# Patient Record
Sex: FEMALE | Race: WHITE | Employment: OTHER | URBAN - METROPOLITAN AREA
[De-identification: names, ages, dates, MRNs, and addresses within clinical notes are randomized per-mention and may not be internally consistent; named-entity substitution may affect disease eponyms.]

---

## 2017-02-08 PROBLEM — R41.89 COGNITIVE IMPAIRMENT: Status: ACTIVE | Noted: 2017-02-08

## 2017-02-28 PROBLEM — I67.2 CEREBRAL ATHEROSCLEROSIS: Status: ACTIVE | Noted: 2017-02-28

## 2018-04-13 ENCOUNTER — HOSPITAL ENCOUNTER (OUTPATIENT)
Age: 83
Discharge: HOME | End: 2018-04-13
Payer: MEDICARE

## 2018-04-13 LAB
ALBUMIN SERPL-MCNC: 4.1 G/DL (ref 3.5–5.2)
ALP BLD-CCNC: 103 U/L (ref 35–104)
ALT SERPL-CCNC: 16 U/L (ref 0–32)
ANION GAP SERPL CALCULATED.3IONS-SCNC: 15 MMOL/L (ref 7–16)
AST SERPL-CCNC: 15 U/L (ref 0–31)
BILIRUB SERPL-MCNC: 0.6 MG/DL (ref 0–1.2)
BUN BLDV-MCNC: 38 MG/DL (ref 8–23)
CALCIUM SERPL-MCNC: 9.1 MG/DL (ref 8.6–10.2)
CHLORIDE BLD-SCNC: 103 MMOL/L (ref 98–107)
CHOLESTEROL, TOTAL: 127 MG/DL (ref 0–199)
CO2: 26 MMOL/L (ref 22–29)
CREAT SERPL-MCNC: 1.4 MG/DL (ref 0.5–1)
GFR AFRICAN AMERICAN: 43
GFR NON-AFRICAN AMERICAN: 36 ML/MIN/1.73
GLUCOSE BLD-MCNC: 154 MG/DL (ref 74–109)
HDLC SERPL-MCNC: 56 MG/DL
LDL CHOLESTEROL CALCULATED: 49 MG/DL (ref 0–99)
POTASSIUM SERPL-SCNC: 4.8 MMOL/L (ref 3.5–5)
SODIUM BLD-SCNC: 144 MMOL/L (ref 132–146)
TOTAL PROTEIN: 6.6 G/DL (ref 6.4–8.3)
TRIGL SERPL-MCNC: 108 MG/DL (ref 0–149)
TSH SERPL DL<=0.05 MIU/L-ACNC: 17.34 UIU/ML (ref 0.27–4.2)
VITAMIN D 25-HYDROXY: 47 NG/ML (ref 30–100)
VLDLC SERPL CALC-MCNC: 22 MG/DL

## 2018-04-13 PROCEDURE — 80061 LIPID PANEL: CPT

## 2018-04-13 PROCEDURE — 84443 ASSAY THYROID STIM HORMONE: CPT

## 2018-04-13 PROCEDURE — 82306 VITAMIN D 25 HYDROXY: CPT

## 2018-04-13 PROCEDURE — 80053 COMPREHEN METABOLIC PANEL: CPT

## 2018-04-13 PROCEDURE — 36415 COLL VENOUS BLD VENIPUNCTURE: CPT

## 2018-09-06 ENCOUNTER — HOSPITAL ENCOUNTER (OUTPATIENT)
Age: 83
Discharge: HOME | End: 2018-09-06
Payer: MEDICARE

## 2018-09-06 LAB — TSH SERPL DL<=0.05 MIU/L-ACNC: 2.92 UIU/ML (ref 0.27–4.2)

## 2018-09-06 PROCEDURE — 36415 COLL VENOUS BLD VENIPUNCTURE: CPT

## 2018-09-06 PROCEDURE — 84443 ASSAY THYROID STIM HORMONE: CPT

## 2019-01-01 ENCOUNTER — OFFICE VISIT (OUTPATIENT)
Dept: PRIMARY CARE CLINIC | Age: 84
End: 2019-01-01
Payer: MEDICARE

## 2019-01-01 VITALS
RESPIRATION RATE: 20 BRPM | BODY MASS INDEX: 26.93 KG/M2 | WEIGHT: 152 LBS | DIASTOLIC BLOOD PRESSURE: 56 MMHG | HEART RATE: 56 BPM | SYSTOLIC BLOOD PRESSURE: 116 MMHG | HEIGHT: 63 IN | OXYGEN SATURATION: 96 %

## 2019-01-01 VITALS
HEART RATE: 69 BPM | HEIGHT: 63 IN | RESPIRATION RATE: 20 BRPM | WEIGHT: 152 LBS | OXYGEN SATURATION: 97 % | DIASTOLIC BLOOD PRESSURE: 66 MMHG | SYSTOLIC BLOOD PRESSURE: 125 MMHG | BODY MASS INDEX: 26.93 KG/M2

## 2019-01-01 DIAGNOSIS — N39.0 RECURRENT UTI: ICD-10-CM

## 2019-01-01 DIAGNOSIS — I25.10 CORONARY ARTERIOSCLEROSIS: ICD-10-CM

## 2019-01-01 DIAGNOSIS — Z79.4 TYPE 2 DIABETES MELLITUS WITH STAGE 3 CHRONIC KIDNEY DISEASE, WITH LONG-TERM CURRENT USE OF INSULIN (HCC): ICD-10-CM

## 2019-01-01 DIAGNOSIS — Z23 NEED FOR PNEUMOCOCCAL VACCINATION: ICD-10-CM

## 2019-01-01 DIAGNOSIS — F02.818 LATE ONSET ALZHEIMER'S DISEASE WITH BEHAVIORAL DISTURBANCE (HCC): Primary | ICD-10-CM

## 2019-01-01 DIAGNOSIS — I10 BENIGN HYPERTENSION: ICD-10-CM

## 2019-01-01 DIAGNOSIS — Z23 NEED FOR INFLUENZA VACCINATION: Primary | ICD-10-CM

## 2019-01-01 DIAGNOSIS — N18.30 TYPE 2 DIABETES MELLITUS WITH STAGE 3 CHRONIC KIDNEY DISEASE, WITH LONG-TERM CURRENT USE OF INSULIN (HCC): ICD-10-CM

## 2019-01-01 DIAGNOSIS — M48.061 SPINAL STENOSIS, LUMBAR REGION, WITHOUT NEUROGENIC CLAUDICATION: ICD-10-CM

## 2019-01-01 DIAGNOSIS — I67.2 CEREBRAL ATHEROSCLEROSIS: ICD-10-CM

## 2019-01-01 DIAGNOSIS — G30.1 LATE ONSET ALZHEIMER'S DISEASE WITH BEHAVIORAL DISTURBANCE (HCC): Primary | ICD-10-CM

## 2019-01-01 DIAGNOSIS — E11.22 TYPE 2 DIABETES MELLITUS WITH STAGE 3 CHRONIC KIDNEY DISEASE, WITH LONG-TERM CURRENT USE OF INSULIN (HCC): ICD-10-CM

## 2019-01-01 LAB
BUN BLDV-MCNC: 33 MG/DL
CALCIUM SERPL-MCNC: 9.5 MG/DL
CHLORIDE BLD-SCNC: 102 MMOL/L
CO2: 29 MMOL/L
CREAT SERPL-MCNC: 1.4 MG/DL
GFR CALCULATED: 36
GLUCOSE BLD-MCNC: 101 MG/DL
POTASSIUM SERPL-SCNC: 4.4 MMOL/L
SODIUM BLD-SCNC: 142 MMOL/L

## 2019-01-01 PROCEDURE — G0009 ADMIN PNEUMOCOCCAL VACCINE: HCPCS | Performed by: PHYSICIAN ASSISTANT

## 2019-01-01 PROCEDURE — 90653 IIV ADJUVANT VACCINE IM: CPT | Performed by: PHYSICIAN ASSISTANT

## 2019-01-01 PROCEDURE — G0008 ADMIN INFLUENZA VIRUS VAC: HCPCS | Performed by: PHYSICIAN ASSISTANT

## 2019-01-01 PROCEDURE — 90732 PPSV23 VACC 2 YRS+ SUBQ/IM: CPT | Performed by: PHYSICIAN ASSISTANT

## 2019-01-01 RX ORDER — INSULIN LISPRO 100 [IU]/ML
INJECTION, SUSPENSION SUBCUTANEOUS
Qty: 30 PEN | Refills: 11 | Status: SHIPPED
Start: 2019-01-01 | End: 2020-01-01 | Stop reason: DRUGHIGH

## 2019-01-01 RX ORDER — BLOOD-GLUCOSE CONTROL, NORMAL
EACH MISCELLANEOUS
Refills: 5 | COMMUNITY
Start: 2019-01-01

## 2019-01-01 RX ORDER — INSULIN LISPRO 100 [IU]/ML
INJECTION, SUSPENSION SUBCUTANEOUS
Qty: 5 PEN | Refills: 11 | Status: SHIPPED | OUTPATIENT
Start: 2019-01-01 | End: 2019-01-01 | Stop reason: SDUPTHER

## 2019-01-01 RX ORDER — INSULIN LISPRO 100 [IU]/ML
INJECTION, SUSPENSION SUBCUTANEOUS
Qty: 5 PEN | Refills: 11
Start: 2019-01-01 | End: 2019-01-01

## 2019-07-11 VITALS — BODY MASS INDEX: 27.82 KG/M2 | HEIGHT: 63 IN | WEIGHT: 157 LBS

## 2019-07-11 RX ORDER — METOPROLOL SUCCINATE 50 MG/1
1 TABLET, EXTENDED RELEASE ORAL DAILY
COMMUNITY
End: 2019-07-11

## 2019-07-11 RX ORDER — MELATONIN 2.5 MG
2 TABLET,CHEWABLE ORAL NIGHTLY
COMMUNITY
Start: 2018-11-29

## 2019-07-11 RX ORDER — INSULIN LISPRO 100 [IU]/ML
100 INJECTION, SUSPENSION SUBCUTANEOUS 2 TIMES DAILY
COMMUNITY
End: 2019-07-11

## 2019-07-11 RX ORDER — ACETAMINOPHEN 325 MG/1
650 TABLET ORAL EVERY 6 HOURS PRN
COMMUNITY
Start: 2018-11-29

## 2019-07-11 RX ORDER — TORSEMIDE 10 MG/1
1 TABLET ORAL DAILY
COMMUNITY
End: 2019-07-11

## 2019-07-11 RX ORDER — LEVOTHYROXINE SODIUM 0.1 MG/1
1 TABLET ORAL DAILY
COMMUNITY
Start: 2019-05-10 | End: 2019-07-30

## 2019-07-11 RX ORDER — ATORVASTATIN CALCIUM 20 MG/1
1 TABLET, FILM COATED ORAL DAILY
COMMUNITY
End: 2019-07-11

## 2019-07-11 RX ORDER — LISINOPRIL 2.5 MG/1
1 TABLET ORAL DAILY
COMMUNITY
Start: 2019-06-10 | End: 2019-07-11

## 2019-07-30 ENCOUNTER — OFFICE VISIT (OUTPATIENT)
Dept: PRIMARY CARE CLINIC | Age: 84
End: 2019-07-30
Payer: MEDICARE

## 2019-07-30 VITALS
SYSTOLIC BLOOD PRESSURE: 130 MMHG | BODY MASS INDEX: 27.96 KG/M2 | HEART RATE: 62 BPM | WEIGHT: 157.8 LBS | OXYGEN SATURATION: 96 % | DIASTOLIC BLOOD PRESSURE: 68 MMHG | HEIGHT: 63 IN | RESPIRATION RATE: 18 BRPM

## 2019-07-30 DIAGNOSIS — G89.29 CHRONIC LOW BACK PAIN, UNSPECIFIED BACK PAIN LATERALITY, WITH SCIATICA PRESENCE UNSPECIFIED: ICD-10-CM

## 2019-07-30 DIAGNOSIS — I10 BENIGN HYPERTENSION: ICD-10-CM

## 2019-07-30 DIAGNOSIS — E11.22 TYPE 2 DIABETES MELLITUS WITH STAGE 3 CHRONIC KIDNEY DISEASE, WITH LONG-TERM CURRENT USE OF INSULIN (HCC): Primary | ICD-10-CM

## 2019-07-30 DIAGNOSIS — E11.22 TYPE 2 DIABETES MELLITUS WITH STAGE 3 CHRONIC KIDNEY DISEASE, WITH LONG-TERM CURRENT USE OF INSULIN (HCC): ICD-10-CM

## 2019-07-30 DIAGNOSIS — I25.10 CORONARY ARTERIOSCLEROSIS: ICD-10-CM

## 2019-07-30 DIAGNOSIS — E03.9 HYPOTHYROIDISM, UNSPECIFIED TYPE: ICD-10-CM

## 2019-07-30 DIAGNOSIS — Z79.4 TYPE 2 DIABETES MELLITUS WITH STAGE 3 CHRONIC KIDNEY DISEASE, WITH LONG-TERM CURRENT USE OF INSULIN (HCC): Primary | ICD-10-CM

## 2019-07-30 DIAGNOSIS — Z79.4 TYPE 2 DIABETES MELLITUS WITH STAGE 3 CHRONIC KIDNEY DISEASE, WITH LONG-TERM CURRENT USE OF INSULIN (HCC): ICD-10-CM

## 2019-07-30 DIAGNOSIS — M48.061 SPINAL STENOSIS, LUMBAR REGION, WITHOUT NEUROGENIC CLAUDICATION: ICD-10-CM

## 2019-07-30 DIAGNOSIS — R30.0 DYSURIA: ICD-10-CM

## 2019-07-30 DIAGNOSIS — F03.91 DEMENTIA WITH BEHAVIORAL DISTURBANCE, UNSPECIFIED DEMENTIA TYPE: ICD-10-CM

## 2019-07-30 DIAGNOSIS — M54.5 CHRONIC LOW BACK PAIN, UNSPECIFIED BACK PAIN LATERALITY, WITH SCIATICA PRESENCE UNSPECIFIED: ICD-10-CM

## 2019-07-30 DIAGNOSIS — E78.2 MIXED HYPERLIPIDEMIA: ICD-10-CM

## 2019-07-30 DIAGNOSIS — N39.0 RECURRENT UTI: ICD-10-CM

## 2019-07-30 DIAGNOSIS — N18.30 TYPE 2 DIABETES MELLITUS WITH STAGE 3 CHRONIC KIDNEY DISEASE, WITH LONG-TERM CURRENT USE OF INSULIN (HCC): ICD-10-CM

## 2019-07-30 DIAGNOSIS — N18.30 TYPE 2 DIABETES MELLITUS WITH STAGE 3 CHRONIC KIDNEY DISEASE, WITH LONG-TERM CURRENT USE OF INSULIN (HCC): Primary | ICD-10-CM

## 2019-07-30 PROBLEM — E55.9 VITAMIN D DEFICIENCY: Status: ACTIVE | Noted: 2019-07-03

## 2019-07-30 PROBLEM — E11.9 TYPE 2 DIABETES MELLITUS (HCC): Status: ACTIVE | Noted: 2017-06-21

## 2019-07-30 PROBLEM — R41.89 COGNITIVE IMPAIRMENT: Status: RESOLVED | Noted: 2017-02-08 | Resolved: 2019-07-30

## 2019-07-30 LAB
BILIRUBIN, URINE: NEGATIVE
BLOOD, URINE: POSITIVE
CLARITY: ABNORMAL
COLOR: YELLOW
GLUCOSE URINE: NEGATIVE
KETONES, URINE: NEGATIVE
LEUKOCYTE ESTERASE, URINE: ABNORMAL
NITRITE, URINE: NEGATIVE
PH UA: 5 (ref 4.5–8)
PROTEIN UA: NEGATIVE
SPECIFIC GRAVITY, URINE: 1.01
UROBILINOGEN, URINE: NORMAL

## 2019-07-30 PROCEDURE — 99344 HOME/RES VST NEW MOD MDM 60: CPT | Performed by: FAMILY MEDICINE

## 2019-07-30 RX ORDER — MEMANTINE HYDROCHLORIDE 5 MG-10 MG
KIT ORAL
Qty: 1 PACKAGE | Refills: 0 | Status: SHIPPED | OUTPATIENT
Start: 2019-07-30 | End: 2019-08-27

## 2019-07-30 RX ORDER — ACETAMINOPHEN 160 MG
1 TABLET,DISINTEGRATING ORAL DAILY
Refills: 11 | COMMUNITY
Start: 2019-07-19

## 2019-07-30 RX ORDER — LEVOTHYROXINE SODIUM 88 UG/1
1 TABLET ORAL
Refills: 0 | COMMUNITY
Start: 2019-07-11 | End: 2020-01-01

## 2019-07-30 NOTE — PROGRESS NOTES
Spinal stenosis, lumbar region, without neurogenic claudication; Displacement of lumbar intervertebral disc without myelopathy; L1 vertebral fracture ([address] T12 compression fracture ([address] Back pain; Cerebral atherosclerosis; Type 2 diabetes mellitus with stage 3 chronic kidney disease, with long-term current use of insulin ([address] Mixed hyperlipidemia; Hypothyroidism; Vitamin D deficiency; Benign hypertension; Coronary arteriosclerosis; Dementia with behavioral disturbance; and Recurrent UTI on their problem list.     Diagnoses attached to this encounter:  Diagnoses and all orders for this visit:    Type 2 diabetes mellitus with stage 3 chronic kidney disease, with long-term current use of insulin ([address]  -     URINALYSIS; Future    Dementia with behavioral disturbance, unspecified dementia type    Benign hypertension    Coronary arteriosclerosis    Mixed hyperlipidemia    Chronic low back pain, unspecified back pain laterality, with sciatica presence unspecified    Spinal stenosis, lumbar region, without neurogenic claudication    Hypothyroidism, unspecified type    Dysuria  -     URINALYSIS; Future    Recurrent UTI    Other orders  -     memantine (NAMENDA TITRATION ROSEANN) 5 (28)-10 (21) MG tablet pack; 5 mg/day for =1 week; 5 mg twice daily for =1 week; 15 mg/day given in 5 mg and 10 mg  doses for =1 week; then 10 mg twice daily       PLAN:  Start trial of Namenda titration kit. Type 2 DM managed by Dr Alex Javier at Dr Beverley Loco office. Dtr plans to take patient to Dr Devyn Joe for dementia evaluation. Continue other meds as per Rx List. Recheck 1 month. 60 minutes spent on visit, 35 minutes involved education/counseling regarding dementia, cardiac, DM, HTN and thyroid disease processes, treatment options, meds and coordination of care.    Current Outpatient Medications   Medication Sig Dispense Refill    levothyroxine (SYNTHROID) 88 MCG tablet Take 1 tablet by mouth every morning (before breakfast)  0  memantine (NAMENDA TITRATION ROSEANN) 5 (28)-10 (21) MG tablet pack 5 mg/day for =1 week; 5 mg twice daily for =1 week; 15 mg/day given in 5 mg and 10 mg  doses for =1 week; then 10 mg twice daily 1 Package 0    blood glucose test strips (ASCENSIA AUTODISC VI;ONE TOUCH ULTRA TEST VI) strip TEST ONCE DAILY      Insulin Pen Needle 32G X 6 MM MISC BD Ultra-Fine Paige Pen Needles 32 gauge x 5/32\"      acetaminophen (TYLENOL) 325 MG tablet Take 650 mg by mouth every 6 hours as needed for Pain or Fever      Melatonin Gummies 2.5 MG CHEW Take 2 each by mouth nightly      Insulin Lispro Prot & Lispro (HUMALOG MIX 75/25 KWIKPEN SC) Inject 100 Units into the skin 2 times daily 25 UNITS before breakfast & 10 UNITS before dinner      Ascorbic Acid (VITAMIN C) 500 MG tablet Take 500 mg by mouth 2 times daily      atorvastatin (LIPITOR) 20 MG tablet Take 20 mg by mouth daily.  torsemide (DEMADEX) 10 MG tablet Take 10 mg by mouth daily.  metoprolol (TOPROL-XL) 50 MG XL tablet Take 50 mg by mouth daily.  lisinopril (PRINIVIL;ZESTRIL) 2.5 MG tablet Take 2.5 mg by mouth daily.  Cholecalciferol (VITAMIN D3) 2000 units CAPS Take 1 capsule by mouth daily  11     No current facility-administered medications for this visit. Return in about 1 month (around 8/30/2019). An  electronic signature was used to authenticate this note.     --Bonita Sena MD on 7/30/2019 at 9:02 PM 83.76

## 2019-07-31 ENCOUNTER — TELEPHONE (OUTPATIENT)
Dept: PRIMARY CARE CLINIC | Age: 84
End: 2019-07-31

## 2019-07-31 RX ORDER — MEMANTINE HYDROCHLORIDE 10 MG/1
TABLET ORAL
Qty: 62 TABLET | Refills: 10 | Status: SHIPPED
Start: 2019-07-31 | End: 2020-01-01

## 2019-08-27 RX ORDER — INSULIN LISPRO 100 [IU]/ML
INJECTION, SUSPENSION SUBCUTANEOUS
Refills: 1 | COMMUNITY
Start: 2019-08-16 | End: 2019-09-30 | Stop reason: SDUPTHER

## 2019-08-27 NOTE — PROGRESS NOTES
8/28/2019    Home visit medically necessary in lieu of an office visit due to: [place] resident, dementia, difficult to get out. Seen with dtr Lydia Wiley. HPI:  Staff report that patient is doing well since being started on Namenda. Seems to be less anxious and sleeping well at night. Patient says she feels well and has a good appetite. She has had no sxms of UTIs and she's moving her bowels well. She has not been up at night wandering like she was either since being started her on melatonin. She has not had any falls this month. REVIEW OF SYSTEMS:    GENERAL: Appetite good. No weight change, generally healthy, no change in strength or exercise tolerance. CARDIOVASCULAR: No edema, no chest pains, no murmurs, no palpitations, no syncope, no orthopnea. RESPIRATORY: No shortness of breath, no pain with breathing, no wheezing, no hemoptysis, no cough. GASTROINTESTINAL: No change in appetite, no dysphagia, no heartburn, no abdominal pains, no emesis, no melena, no hemorrhoids. DIARRHEA, CONSTIPATION. GENITOURINARY: No incontinence or retention, no urinary urgency, no nocturia, no dysuria, no change in nature of urine. RECURRENT UTIs. MUSCULOSKELETAL: No pain in muscles or joints, no swelling or redness of joints, no limitation of range of motion, no weakness or numbness. NEURO/PSYCH: No weakness, no tremor, no seizures, no changes in mentation, no ataxia. No depressive symptoms, no changes in sleep habits. DEMENTIA,      All other systems negative.     Allergies   Allergen Reactions    Darvon [Propoxyphene Hcl]     Pcn [Penicillins]        Past Medical History:   Diagnosis Date    Breast cancer ([facility]     RIGHT BREAST MASTECTOMY    CAD (coronary artery disease)     Chronic back pain     Displacement of lumbar intervertebral disc without myelopathy     Forgetfulness     Hypertension     Influenza vaccine administered 03/2019    Memory change     Pneumococcal vaccine

## 2019-08-28 ENCOUNTER — OFFICE VISIT (OUTPATIENT)
Dept: PRIMARY CARE CLINIC | Age: 84
End: 2019-08-28
Payer: MEDICARE

## 2019-08-28 VITALS
RESPIRATION RATE: 20 BRPM | DIASTOLIC BLOOD PRESSURE: 64 MMHG | SYSTOLIC BLOOD PRESSURE: 142 MMHG | HEART RATE: 58 BPM | TEMPERATURE: 97.1 F | BODY MASS INDEX: 28 KG/M2 | WEIGHT: 158 LBS | HEIGHT: 63 IN | OXYGEN SATURATION: 97 %

## 2019-08-28 DIAGNOSIS — I25.10 CORONARY ARTERIOSCLEROSIS: ICD-10-CM

## 2019-08-28 DIAGNOSIS — I10 BENIGN HYPERTENSION: ICD-10-CM

## 2019-08-28 DIAGNOSIS — G30.1 LATE ONSET ALZHEIMER'S DISEASE WITH BEHAVIORAL DISTURBANCE (HCC): Primary | ICD-10-CM

## 2019-08-28 DIAGNOSIS — E03.9 HYPOTHYROIDISM, UNSPECIFIED TYPE: ICD-10-CM

## 2019-08-28 DIAGNOSIS — I67.2 CEREBRAL ATHEROSCLEROSIS: ICD-10-CM

## 2019-08-28 DIAGNOSIS — F02.818 LATE ONSET ALZHEIMER'S DISEASE WITH BEHAVIORAL DISTURBANCE (HCC): Primary | ICD-10-CM

## 2019-08-28 ASSESSMENT — PATIENT HEALTH QUESTIONNAIRE - PHQ9
SUM OF ALL RESPONSES TO PHQ QUESTIONS 1-9: 0
2. FEELING DOWN, DEPRESSED OR HOPELESS: 0
1. LITTLE INTEREST OR PLEASURE IN DOING THINGS: 0
SUM OF ALL RESPONSES TO PHQ QUESTIONS 1-9: 0
SUM OF ALL RESPONSES TO PHQ9 QUESTIONS 1 & 2: 0

## 2019-09-25 NOTE — PROGRESS NOTES
9/26/2019    Home visit medically necessary in lieu of an office visit due to: [place] resident, dementia, difficult to get out. Seen with dtr Abimbola Granda. HPI:  Staff and dtr report that patient is doing well since being started on Namenda. Less anxious and sleeping well at night. Patient says she feels well and has a good appetite. She has had no sxms of UTIs and she's moving her bowels well. She has not been up at night wandering like she was either since being started her on melatonin. She has not had any falls this month. REVIEW OF SYSTEMS:    GENERAL: Appetite good. No weight change, generally healthy, no change in strength or exercise tolerance. CARDIOVASCULAR: No edema, no chest pains, no murmurs, no palpitations, no syncope, no orthopnea. RESPIRATORY: No shortness of breath, no pain with breathing, no wheezing, no hemoptysis, no cough. GASTROINTESTINAL: No change in appetite, no dysphagia, no heartburn, no abdominal pains, no emesis, no melena, no hemorrhoids. DIARRHEA, CONSTIPATION. GENITOURINARY: No incontinence or retention, no urinary urgency, no nocturia, no dysuria, no change in nature of urine. RECURRENT UTIs. MUSCULOSKELETAL: No pain in muscles or joints, no swelling or redness of joints, no limitation of range of motion, no weakness or numbness. NEURO/PSYCH: No weakness, no tremor, no seizures, no changes in mentation, no ataxia. No depressive symptoms, no changes in sleep habits. DEMENTIA,      All other systems negative.     Allergies   Allergen Reactions    Darvon [Propoxyphene Hcl]     Pcn [Penicillins]        Past Medical History:   Diagnosis Date    Breast [facility] ([facility] [address]     RIGHT BREAST MASTECTOMY    CAD (coronary artery disease)     Chronic back pain     Displacement of lumbar intervertebral disc without myelopathy     Forgetfulness     Hypertension     Influenza vaccine administered 03/2019    Memory change     Pneumococcal vaccine administered and or with movement. SKIN:  No ulcerations or breakdown, rash, ecchymosis, or other lesions. NEURO:   No tremor, motor UEs 5/5 LEs 5/5, sensory normal, mild ataxia. PSYCH:  Pleasant and cooperative. Fluid speech, oriented to person, partially to place, but not time. No delusional statements. Medications reviewed. Labs 8/26/19 FT4 1.20  7/21/19 UA CS/ mixed mally<10,000  7/9/19 HH 12/35, GFR 33, BUN/cre 38/1.5, lytes nl, LFTs nl, A1c 8.1, , HDL 49, FT4 1.1, TSH 0.22, Vit D 24    ASSESSMENT:  Elderly female has Spinal stenosis, lumbar region, without neurogenic claudication; Displacement of lumbar intervertebral disc without myelopathy; L1 vertebral fracture ([address] T12 compression fracture ([address] Back pain; Cerebral atherosclerosis; Type 2 diabetes mellitus with stage 3 chronic kidney disease, with long-term current use of insulin ([address] Mixed hyperlipidemia; Hypothyroidism; Vitamin D deficiency; Benign hypertension; Coronary arteriosclerosis; Late onset Alzheimer's disease with behavioral disturbance; and Recurrent UTI on their problem list.     Diagnoses attached to this encounter:  Austin Daniel was seen today for diabetes. Diagnoses and all orders for this visit:    Late onset Alzheimer's disease with behavioral disturbance    Spinal stenosis, lumbar region, without neurogenic claudication    Recurrent UTI       PLAN:  Continue Namenda. Type 2 DM managed by Dr Dipti Naidu at Dr Waqar Figueredo office. Check BMP in October. Continue other meds as per Rx List. Recheck 1 month. 40 minutes spent on visit, 25 minutes involved education/counseling regarding dementia, cardiac, DM, HTN and thyroid disease processes, treatment options, meds and coordination of care. Current Outpatient Medications   Medication Sig Dispense Refill    NOVOFINE AUTOCOVER 30G X 8 MM MISC USE AS DIRECTED TWICE DAILY.  60 each 11    HUMALOG MIX 75/25 KWIKPEN (75-25) 100 UNIT/ML SUPN injection pen INJECT 25 UNITS SUB-Q BEFORE BREAKFAST AND 10 UNITS SUB-Q BEFORE DINNER  1    memantine (NAMENDA) 10 MG tablet TAKE 1 TABLET BY MOUTH TWICE DAILY 62 tablet 10    Cholecalciferol (VITAMIN D3) 2000 units CAPS Take 1 capsule by mouth daily  11    levothyroxine (SYNTHROID) 88 MCG tablet Take 1 tablet by mouth every morning (before breakfast)  0    blood glucose test strips (ASCENSIA AUTODISC VI;ONE TOUCH ULTRA TEST VI) strip TEST ONCE DAILY      acetaminophen (TYLENOL) 325 MG tablet Take 650 mg by mouth every 6 hours as needed for Pain or Fever      Melatonin Gummies 2.5 MG CHEW Take 2 each by mouth nightly      Ascorbic Acid (VITAMIN C) 500 MG tablet Take 500 mg by mouth 2 times daily      atorvastatin (LIPITOR) 20 MG tablet Take 20 mg by mouth daily.  torsemide (DEMADEX) 10 MG tablet Take 10 mg by mouth daily.  metoprolol (TOPROL-XL) 50 MG XL tablet Take 50 mg by mouth daily.  lisinopril (PRINIVIL;ZESTRIL) 2.5 MG tablet Take 2.5 mg by mouth daily. No current facility-administered medications for this visit. CARE PLANS:    Dementia Care Plan     Goal:  Encourage stable environment, maintain safety, minimal meds   Provide calming, safe and secure environment with precautions for wandering.  Staff to redirect for episodes of anxiety or agitation.  Medications used only for persistent and difficult to control behaviors that are dangerous to self or others.  Help family understand progression of disease and provide emotional and social support resources. Preventing Falls Care Plan Goals: Increased stability, no falls.  Avoid rugs, slippery surfaces, loose carpet edges or loose items on the floor.  Always wear non-slip footwear. NO SOCKS only!  Make sure area where you are walking is well lit.  Stop and observe before starting. Notice and use the clear path.  Use your walker or cane to provide stability.  Dont get tangled up in your oxygen tubing.     Diabetes Care Plan Goal: Prevent organ

## 2019-09-26 ENCOUNTER — OFFICE VISIT (OUTPATIENT)
Dept: PRIMARY CARE CLINIC | Age: 84
End: 2019-09-26
Payer: MEDICARE

## 2019-09-26 VITALS
OXYGEN SATURATION: 97 % | DIASTOLIC BLOOD PRESSURE: 73 MMHG | HEIGHT: 63 IN | RESPIRATION RATE: 20 BRPM | BODY MASS INDEX: 26.05 KG/M2 | WEIGHT: 147 LBS | HEART RATE: 55 BPM | SYSTOLIC BLOOD PRESSURE: 140 MMHG

## 2019-09-26 DIAGNOSIS — M48.061 SPINAL STENOSIS, LUMBAR REGION, WITHOUT NEUROGENIC CLAUDICATION: ICD-10-CM

## 2019-09-26 DIAGNOSIS — N39.0 RECURRENT UTI: ICD-10-CM

## 2019-09-26 DIAGNOSIS — F02.818 LATE ONSET ALZHEIMER'S DISEASE WITH BEHAVIORAL DISTURBANCE (HCC): Primary | ICD-10-CM

## 2019-09-26 DIAGNOSIS — G30.1 LATE ONSET ALZHEIMER'S DISEASE WITH BEHAVIORAL DISTURBANCE (HCC): Primary | ICD-10-CM

## 2019-09-30 RX ORDER — INSULIN LISPRO 100 [IU]/ML
INJECTION, SUSPENSION SUBCUTANEOUS
Qty: 5 PEN | Refills: 11 | Status: SHIPPED | OUTPATIENT
Start: 2019-09-30 | End: 2019-10-08 | Stop reason: DRUGHIGH

## 2019-10-08 RX ORDER — INSULIN LISPRO 100 [IU]/ML
INJECTION, SUSPENSION SUBCUTANEOUS
Qty: 5 PEN | Refills: 11 | Status: SHIPPED
Start: 2019-10-08 | End: 2019-01-01

## 2020-01-01 ENCOUNTER — OUTSIDE SERVICES (OUTPATIENT)
Dept: PRIMARY CARE CLINIC | Age: 85
End: 2020-01-01
Payer: MEDICARE

## 2020-01-01 ENCOUNTER — HOSPITAL ENCOUNTER (OUTPATIENT)
Age: 85
Discharge: HOME | End: 2020-09-03
Payer: MEDICARE

## 2020-01-01 ENCOUNTER — HOSPITAL ENCOUNTER (OUTPATIENT)
Age: 85
Discharge: HOME | End: 2020-07-05
Payer: MEDICARE

## 2020-01-01 ENCOUNTER — TELEPHONE (OUTPATIENT)
Dept: PRIMARY CARE CLINIC | Age: 85
End: 2020-01-01

## 2020-01-01 ENCOUNTER — HOSPITAL ENCOUNTER (EMERGENCY)
Age: 85
Discharge: HOME | End: 2020-03-01
Attending: EMERGENCY MEDICINE
Payer: MEDICARE

## 2020-01-01 ENCOUNTER — APPOINTMENT (OUTPATIENT)
Dept: GENERAL RADIOLOGY | Age: 85
DRG: 478 | End: 2020-01-01
Payer: MEDICARE

## 2020-01-01 ENCOUNTER — ANESTHESIA EVENT (OUTPATIENT)
Dept: OPERATING ROOM | Age: 85
DRG: 478 | End: 2020-01-01
Payer: MEDICARE

## 2020-01-01 ENCOUNTER — APPOINTMENT (OUTPATIENT)
Dept: GENERAL RADIOLOGY | Age: 85
DRG: 871 | End: 2020-01-01
Payer: MEDICARE

## 2020-01-01 ENCOUNTER — APPOINTMENT (OUTPATIENT)
Dept: CT IMAGING | Age: 85
DRG: 871 | End: 2020-01-01
Payer: MEDICARE

## 2020-01-01 ENCOUNTER — HOSPITAL ENCOUNTER (OUTPATIENT)
Age: 85
Discharge: HOME | End: 2020-08-30
Payer: MEDICARE

## 2020-01-01 ENCOUNTER — HOSPITAL ENCOUNTER (INPATIENT)
Age: 85
LOS: 3 days | Discharge: NURSING, REHAB OR LONG-TERM CARE | DRG: 478 | End: 2020-08-05
Attending: EMERGENCY MEDICINE | Admitting: INTERNAL MEDICINE
Payer: MEDICARE

## 2020-01-01 ENCOUNTER — HOSPITAL ENCOUNTER (OUTPATIENT)
Age: 85
Discharge: HOME | End: 2020-05-14
Payer: MEDICARE

## 2020-01-01 ENCOUNTER — APPOINTMENT (OUTPATIENT)
Dept: CT IMAGING | Age: 85
End: 2020-01-01
Payer: MEDICARE

## 2020-01-01 ENCOUNTER — HOSPITAL ENCOUNTER (OUTPATIENT)
Age: 85
Discharge: HOME | End: 2020-08-13

## 2020-01-01 ENCOUNTER — HOSPITAL ENCOUNTER (OUTPATIENT)
Age: 85
Discharge: HOME | End: 2020-04-25
Payer: MEDICARE

## 2020-01-01 ENCOUNTER — HOSPITAL ENCOUNTER (OUTPATIENT)
Age: 85
Discharge: HOME | End: 2020-07-30
Payer: MEDICARE

## 2020-01-01 ENCOUNTER — OFFICE VISIT (OUTPATIENT)
Dept: PRIMARY CARE CLINIC | Age: 85
End: 2020-01-01
Payer: MEDICARE

## 2020-01-01 ENCOUNTER — HOSPITAL ENCOUNTER (OUTPATIENT)
Age: 85
Discharge: HOME | End: 2020-03-21
Payer: MEDICARE

## 2020-01-01 ENCOUNTER — APPOINTMENT (OUTPATIENT)
Dept: ULTRASOUND IMAGING | Age: 85
DRG: 871 | End: 2020-01-01
Payer: MEDICARE

## 2020-01-01 ENCOUNTER — HOSPITAL ENCOUNTER (OUTPATIENT)
Age: 85
Discharge: HOME | End: 2020-08-15

## 2020-01-01 ENCOUNTER — APPOINTMENT (OUTPATIENT)
Dept: MRI IMAGING | Age: 85
DRG: 478 | End: 2020-01-01
Payer: MEDICARE

## 2020-01-01 ENCOUNTER — HOSPITAL ENCOUNTER (INPATIENT)
Age: 85
LOS: 7 days | Discharge: HOSPICE | DRG: 871 | End: 2020-09-16
Attending: EMERGENCY MEDICINE | Admitting: INTERNAL MEDICINE
Payer: MEDICARE

## 2020-01-01 ENCOUNTER — HOSPITAL ENCOUNTER (EMERGENCY)
Age: 85
Discharge: NURSING, REHAB OR LONG-TERM CARE | End: 2020-07-13
Attending: EMERGENCY MEDICINE
Payer: MEDICARE

## 2020-01-01 ENCOUNTER — HOSPITAL ENCOUNTER (OUTPATIENT)
Age: 85
Discharge: HOME | End: 2020-08-14

## 2020-01-01 ENCOUNTER — ANESTHESIA (OUTPATIENT)
Dept: OPERATING ROOM | Age: 85
DRG: 478 | End: 2020-01-01
Payer: MEDICARE

## 2020-01-01 ENCOUNTER — HOSPITAL ENCOUNTER (OUTPATIENT)
Age: 85
Discharge: HOME | DRG: 478 | End: 2020-07-31
Payer: MEDICARE

## 2020-01-01 ENCOUNTER — HOSPITAL ENCOUNTER (OUTPATIENT)
Age: 85
Discharge: HOME | End: 2020-06-05
Payer: MEDICARE

## 2020-01-01 ENCOUNTER — HOSPITAL ENCOUNTER (OUTPATIENT)
Age: 85
Discharge: HOME | End: 2020-03-19
Payer: MEDICARE

## 2020-01-01 ENCOUNTER — APPOINTMENT (OUTPATIENT)
Dept: GENERAL RADIOLOGY | Age: 85
End: 2020-01-01
Payer: MEDICARE

## 2020-01-01 ENCOUNTER — APPOINTMENT (OUTPATIENT)
Dept: CT IMAGING | Age: 85
DRG: 478 | End: 2020-01-01
Payer: MEDICARE

## 2020-01-01 ENCOUNTER — HOSPITAL ENCOUNTER (OUTPATIENT)
Age: 85
Discharge: HOME | End: 2020-06-28
Payer: MEDICARE

## 2020-01-01 ENCOUNTER — HOSPITAL ENCOUNTER (OUTPATIENT)
Age: 85
Discharge: HOME | DRG: 871 | End: 2020-09-10
Payer: MEDICARE

## 2020-01-01 ENCOUNTER — HOSPITAL ENCOUNTER (OUTPATIENT)
Age: 85
Discharge: HOME | End: 2020-03-01
Payer: MEDICARE

## 2020-01-01 VITALS
HEIGHT: 63 IN | SYSTOLIC BLOOD PRESSURE: 120 MMHG | BODY MASS INDEX: 25.78 KG/M2 | HEART RATE: 59 BPM | DIASTOLIC BLOOD PRESSURE: 56 MMHG | OXYGEN SATURATION: 95 % | TEMPERATURE: 97.6 F | RESPIRATION RATE: 16 BRPM | WEIGHT: 145.5 LBS

## 2020-01-01 VITALS
OXYGEN SATURATION: 99 % | WEIGHT: 140 LBS | SYSTOLIC BLOOD PRESSURE: 128 MMHG | DIASTOLIC BLOOD PRESSURE: 72 MMHG | BODY MASS INDEX: 24.8 KG/M2 | HEART RATE: 62 BPM | HEIGHT: 63 IN | RESPIRATION RATE: 20 BRPM | TEMPERATURE: 97.4 F

## 2020-01-01 VITALS
HEART RATE: 76 BPM | BODY MASS INDEX: 25.8 KG/M2 | TEMPERATURE: 97.5 F | RESPIRATION RATE: 20 BRPM | DIASTOLIC BLOOD PRESSURE: 70 MMHG | SYSTOLIC BLOOD PRESSURE: 120 MMHG | HEIGHT: 63 IN | WEIGHT: 145.6 LBS | OXYGEN SATURATION: 95 %

## 2020-01-01 VITALS
HEIGHT: 63 IN | BODY MASS INDEX: 25.98 KG/M2 | TEMPERATURE: 96.6 F | OXYGEN SATURATION: 95 % | SYSTOLIC BLOOD PRESSURE: 131 MMHG | DIASTOLIC BLOOD PRESSURE: 73 MMHG | HEART RATE: 71 BPM | RESPIRATION RATE: 20 BRPM | WEIGHT: 146.6 LBS

## 2020-01-01 VITALS
BODY MASS INDEX: 25.73 KG/M2 | WEIGHT: 145.2 LBS | RESPIRATION RATE: 20 BRPM | DIASTOLIC BLOOD PRESSURE: 68 MMHG | HEART RATE: 62 BPM | SYSTOLIC BLOOD PRESSURE: 115 MMHG | HEIGHT: 63 IN | OXYGEN SATURATION: 96 %

## 2020-01-01 VITALS
RESPIRATION RATE: 16 BRPM | WEIGHT: 140 LBS | HEART RATE: 61 BPM | SYSTOLIC BLOOD PRESSURE: 120 MMHG | TEMPERATURE: 97 F | OXYGEN SATURATION: 96 % | DIASTOLIC BLOOD PRESSURE: 64 MMHG | BODY MASS INDEX: 24.8 KG/M2

## 2020-01-01 VITALS
SYSTOLIC BLOOD PRESSURE: 172 MMHG | RESPIRATION RATE: 16 BRPM | WEIGHT: 145 LBS | HEIGHT: 63 IN | BODY MASS INDEX: 25.69 KG/M2 | OXYGEN SATURATION: 96 % | TEMPERATURE: 98 F | DIASTOLIC BLOOD PRESSURE: 71 MMHG | HEART RATE: 65 BPM

## 2020-01-01 VITALS
SYSTOLIC BLOOD PRESSURE: 121 MMHG | HEIGHT: 63 IN | DIASTOLIC BLOOD PRESSURE: 62 MMHG | TEMPERATURE: 96.6 F | BODY MASS INDEX: 26.19 KG/M2 | HEART RATE: 66 BPM | OXYGEN SATURATION: 95 % | WEIGHT: 147.8 LBS | RESPIRATION RATE: 20 BRPM

## 2020-01-01 VITALS
TEMPERATURE: 97.2 F | OXYGEN SATURATION: 98 % | RESPIRATION RATE: 15 BRPM | SYSTOLIC BLOOD PRESSURE: 151 MMHG | DIASTOLIC BLOOD PRESSURE: 78 MMHG

## 2020-01-01 VITALS
SYSTOLIC BLOOD PRESSURE: 128 MMHG | HEIGHT: 65 IN | WEIGHT: 151.44 LBS | TEMPERATURE: 97 F | RESPIRATION RATE: 18 BRPM | BODY MASS INDEX: 25.23 KG/M2 | DIASTOLIC BLOOD PRESSURE: 56 MMHG | OXYGEN SATURATION: 95 % | HEART RATE: 54 BPM

## 2020-01-01 VITALS
HEIGHT: 63 IN | WEIGHT: 147.8 LBS | BODY MASS INDEX: 26.19 KG/M2 | OXYGEN SATURATION: 96 % | SYSTOLIC BLOOD PRESSURE: 132 MMHG | RESPIRATION RATE: 18 BRPM | DIASTOLIC BLOOD PRESSURE: 68 MMHG | HEART RATE: 70 BPM

## 2020-01-01 LAB
ABO/RH: NORMAL
ALBUMIN SERPL-MCNC: 2.2 G/DL (ref 3.5–5.2)
ALBUMIN SERPL-MCNC: 2.3 G/DL (ref 3.5–5.2)
ALBUMIN SERPL-MCNC: 2.4 G/DL (ref 3.5–5.2)
ALBUMIN SERPL-MCNC: 2.5 G/DL (ref 3.5–5.2)
ALBUMIN SERPL-MCNC: 2.7 G/DL (ref 3.5–5.2)
ALBUMIN SERPL-MCNC: 3.6 G/DL (ref 3.5–5.2)
ALBUMIN SERPL-MCNC: 3.7 G/DL
ALBUMIN SERPL-MCNC: 3.8 G/DL (ref 3.5–5.2)
ALBUMIN SERPL-MCNC: 4 G/DL (ref 3.5–5.2)
ALBUMIN SERPL-MCNC: 4.1 G/DL (ref 3.5–5.2)
ALP BLD-CCNC: 101 U/L (ref 35–104)
ALP BLD-CCNC: 105 U/L (ref 35–104)
ALP BLD-CCNC: 110 U/L
ALP BLD-CCNC: 112 U/L (ref 35–104)
ALP BLD-CCNC: 118 U/L (ref 35–104)
ALP BLD-CCNC: 126 U/L (ref 35–104)
ALP BLD-CCNC: 136 U/L (ref 35–104)
ALP BLD-CCNC: 136 U/L (ref 35–104)
ALP BLD-CCNC: 161 U/L (ref 35–104)
ALP BLD-CCNC: 162 U/L (ref 35–104)
ALP BLD-CCNC: 171 U/L (ref 35–104)
ALP BLD-CCNC: 94 U/L (ref 35–104)
ALT SERPL-CCNC: 10 U/L (ref 0–32)
ALT SERPL-CCNC: 12 U/L (ref 0–32)
ALT SERPL-CCNC: 17 U/L (ref 0–32)
ALT SERPL-CCNC: 18 U/L
ALT SERPL-CCNC: 18 U/L (ref 0–32)
ALT SERPL-CCNC: 18 U/L (ref 0–32)
ALT SERPL-CCNC: 22 U/L (ref 0–32)
ALT SERPL-CCNC: 27 U/L (ref 0–32)
ALT SERPL-CCNC: 46 U/L (ref 0–32)
ALT SERPL-CCNC: 60 U/L (ref 0–32)
ALT SERPL-CCNC: 66 U/L (ref 0–32)
ALT SERPL-CCNC: 8 U/L (ref 0–32)
AMMONIA: 18 UMOL/L (ref 11–51)
ANION GAP SERPL CALCULATED.3IONS-SCNC: 10 MMOL/L (ref 7–16)
ANION GAP SERPL CALCULATED.3IONS-SCNC: 11 MMOL/L (ref 7–16)
ANION GAP SERPL CALCULATED.3IONS-SCNC: 12 MMOL/L (ref 7–16)
ANION GAP SERPL CALCULATED.3IONS-SCNC: 13 MMOL/L (ref 7–16)
ANION GAP SERPL CALCULATED.3IONS-SCNC: 14 MMOL/L (ref 7–16)
ANION GAP SERPL CALCULATED.3IONS-SCNC: 15 MMOL/L (ref 7–16)
ANION GAP SERPL CALCULATED.3IONS-SCNC: 16 MMOL/L (ref 7–16)
ANION GAP SERPL CALCULATED.3IONS-SCNC: 17 MMOL/L (ref 7–16)
ANION GAP SERPL CALCULATED.3IONS-SCNC: 18 MMOL/L (ref 7–16)
ANION GAP SERPL CALCULATED.3IONS-SCNC: 19 MMOL/L (ref 7–16)
ANION GAP SERPL CALCULATED.3IONS-SCNC: 24 MMOL/L (ref 7–16)
ANION GAP SERPL CALCULATED.3IONS-SCNC: ABNORMAL MMOL/L
ANISOCYTOSIS: ABNORMAL
ANTIBODY SCREEN: NORMAL
APTT: 20.4 SEC (ref 24.5–35.1)
AST SERPL-CCNC: 12 U/L (ref 0–31)
AST SERPL-CCNC: 13 U/L
AST SERPL-CCNC: 13 U/L (ref 0–31)
AST SERPL-CCNC: 17 U/L (ref 0–31)
AST SERPL-CCNC: 21 U/L (ref 0–31)
AST SERPL-CCNC: 23 U/L (ref 0–31)
AST SERPL-CCNC: 27 U/L (ref 0–31)
AST SERPL-CCNC: 28 U/L (ref 0–31)
AST SERPL-CCNC: 29 U/L (ref 0–31)
AST SERPL-CCNC: 34 U/L (ref 0–31)
AST SERPL-CCNC: 60 U/L (ref 0–31)
AST SERPL-CCNC: 68 U/L (ref 0–31)
BACTERIA: ABNORMAL /HPF
BACTERIA: NORMAL /HPF
BASOPHILS ABSOLUTE: 0 E9/L (ref 0–0.2)
BASOPHILS ABSOLUTE: 0.05 E9/L (ref 0–0.2)
BASOPHILS ABSOLUTE: 0.06 E9/L (ref 0–0.2)
BASOPHILS ABSOLUTE: 0.11 E9/L (ref 0–0.2)
BASOPHILS ABSOLUTE: 0.2 /ΜL
BASOPHILS RELATIVE PERCENT: 0.2 % (ref 0–2)
BASOPHILS RELATIVE PERCENT: 0.4 % (ref 0–2)
BASOPHILS RELATIVE PERCENT: 0.5 % (ref 0–2)
BASOPHILS RELATIVE PERCENT: 0.5 % (ref 0–2)
BASOPHILS RELATIVE PERCENT: 0.6 % (ref 0–2)
BASOPHILS RELATIVE PERCENT: 1.4 %
BILIRUB SERPL-MCNC: 0.2 MG/DL (ref 0–1.2)
BILIRUB SERPL-MCNC: 0.3 MG/DL (ref 0–1.2)
BILIRUB SERPL-MCNC: 0.4 MG/DL (ref 0–1.2)
BILIRUB SERPL-MCNC: 0.4 MG/DL (ref 0–1.2)
BILIRUB SERPL-MCNC: 0.5 MG/DL (ref 0–1.2)
BILIRUB SERPL-MCNC: 0.5 MG/DL (ref 0–1.2)
BILIRUB SERPL-MCNC: 0.6 MG/DL (ref 0.1–1.4)
BILIRUB SERPL-MCNC: 0.6 MG/DL (ref 0–1.2)
BILIRUBIN URINE: ABNORMAL
BILIRUBIN URINE: NEGATIVE
BILIRUBIN, URINE: NEGATIVE
BLOOD CULTURE, ROUTINE: NORMAL
BLOOD, URINE: ABNORMAL
BLOOD, URINE: ABNORMAL
BLOOD, URINE: NEGATIVE
BLOOD, URINE: POSITIVE
BUN BLDV-MCNC: 106 MG/DL (ref 8–23)
BUN BLDV-MCNC: 109 MG/DL (ref 8–23)
BUN BLDV-MCNC: 109 MG/DL (ref 8–23)
BUN BLDV-MCNC: 124 MG/DL (ref 8–23)
BUN BLDV-MCNC: 13 MG/DL (ref 8–23)
BUN BLDV-MCNC: 159 MG/DL (ref 8–23)
BUN BLDV-MCNC: 24 MG/DL (ref 8–23)
BUN BLDV-MCNC: 24 MG/DL (ref 8–23)
BUN BLDV-MCNC: 31 MG/DL
BUN BLDV-MCNC: 33 MG/DL (ref 8–23)
BUN BLDV-MCNC: 40 MG/DL (ref 8–23)
BUN BLDV-MCNC: 40 MG/DL (ref 8–23)
BUN BLDV-MCNC: 46 MG/DL
BUN BLDV-MCNC: 48 MG/DL (ref 8–23)
BUN BLDV-MCNC: 50 MG/DL (ref 8–23)
BUN BLDV-MCNC: 62 MG/DL (ref 8–23)
BUN BLDV-MCNC: 66 MG/DL (ref 8–23)
BUN BLDV-MCNC: 67 MG/DL (ref 8–23)
BUN BLDV-MCNC: 67 MG/DL (ref 8–23)
BUN BLDV-MCNC: 69 MG/DL (ref 8–23)
BUN BLDV-MCNC: 81 MG/DL (ref 8–23)
BUN BLDV-MCNC: 94 MG/DL (ref 8–23)
BUN BLDV-MCNC: 98 MG/DL (ref 8–23)
BURR CELLS: ABNORMAL
CALCIUM SERPL-MCNC: 7.2 MG/DL (ref 8.6–10.2)
CALCIUM SERPL-MCNC: 7.4 MG/DL (ref 8.6–10.2)
CALCIUM SERPL-MCNC: 7.4 MG/DL (ref 8.6–10.2)
CALCIUM SERPL-MCNC: 7.5 MG/DL (ref 8.6–10.2)
CALCIUM SERPL-MCNC: 7.6 MG/DL (ref 8.6–10.2)
CALCIUM SERPL-MCNC: 7.7 MG/DL (ref 8.6–10.2)
CALCIUM SERPL-MCNC: 7.8 MG/DL (ref 8.6–10.2)
CALCIUM SERPL-MCNC: 7.9 MG/DL (ref 8.6–10.2)
CALCIUM SERPL-MCNC: 7.9 MG/DL (ref 8.6–10.2)
CALCIUM SERPL-MCNC: 8 MG/DL (ref 8.6–10.2)
CALCIUM SERPL-MCNC: 8.1 MG/DL (ref 8.6–10.2)
CALCIUM SERPL-MCNC: 8.8 MG/DL
CALCIUM SERPL-MCNC: 9.1 MG/DL
CALCIUM SERPL-MCNC: 9.1 MG/DL (ref 8.6–10.2)
CALCIUM SERPL-MCNC: 9.5 MG/DL (ref 8.6–10.2)
CALCIUM SERPL-MCNC: 9.6 MG/DL (ref 8.6–10.2)
CALCIUM SERPL-MCNC: 9.6 MG/DL (ref 8.6–10.2)
CALCIUM SERPL-MCNC: 9.8 MG/DL (ref 8.6–10.2)
CALCIUM SERPL-MCNC: 9.9 MG/DL (ref 8.6–10.2)
CHLORIDE BLD-SCNC: 101 MMOL/L (ref 98–107)
CHLORIDE BLD-SCNC: 102 MMOL/L (ref 98–107)
CHLORIDE BLD-SCNC: 103 MMOL/L
CHLORIDE BLD-SCNC: 104 MMOL/L
CHLORIDE BLD-SCNC: 105 MMOL/L (ref 98–107)
CHLORIDE BLD-SCNC: 105 MMOL/L (ref 98–107)
CHLORIDE BLD-SCNC: 106 MMOL/L (ref 98–107)
CHLORIDE BLD-SCNC: 107 MMOL/L (ref 98–107)
CHLORIDE BLD-SCNC: 108 MMOL/L (ref 98–107)
CHLORIDE BLD-SCNC: 112 MMOL/L (ref 98–107)
CHLORIDE BLD-SCNC: 113 MMOL/L (ref 98–107)
CHLORIDE BLD-SCNC: 118 MMOL/L (ref 98–107)
CHLORIDE BLD-SCNC: 118 MMOL/L (ref 98–107)
CHLORIDE BLD-SCNC: 119 MMOL/L (ref 98–107)
CHLORIDE BLD-SCNC: 120 MMOL/L (ref 98–107)
CHLORIDE BLD-SCNC: 95 MMOL/L (ref 98–107)
CHLORIDE BLD-SCNC: 98 MMOL/L (ref 98–107)
CHLORIDE BLD-SCNC: 98 MMOL/L (ref 98–107)
CHLORIDE BLD-SCNC: 99 MMOL/L (ref 98–107)
CHLORIDE BLD-SCNC: 99 MMOL/L (ref 98–107)
CHLORIDE URINE RANDOM: 85 MMOL/L
CHOLESTEROL, TOTAL: 126 MG/DL (ref 0–199)
CK MB: 16.4 NG/ML (ref 0–4.3)
CLARITY: ABNORMAL
CLARITY: CLEAR
CLARITY: CLEAR
CO2: 14 MMOL/L (ref 22–29)
CO2: 14 MMOL/L (ref 22–29)
CO2: 16 MMOL/L (ref 22–29)
CO2: 16 MMOL/L (ref 22–29)
CO2: 18 MMOL/L (ref 22–29)
CO2: 18 MMOL/L (ref 22–29)
CO2: 19 MMOL/L (ref 22–29)
CO2: 23 MMOL/L (ref 22–29)
CO2: 24 MMOL/L (ref 22–29)
CO2: 25 MMOL/L (ref 22–29)
CO2: 26 MMOL/L
CO2: 26 MMOL/L
CO2: 26 MMOL/L (ref 22–29)
CO2: 27 MMOL/L (ref 22–29)
CO2: 27 MMOL/L (ref 22–29)
CO2: 28 MMOL/L (ref 22–29)
CO2: 28 MMOL/L (ref 22–29)
COLOR: YELLOW
CORTISOL TOTAL: 23.81 MCG/DL (ref 2.68–18.4)
CORTISOL TOTAL: 31.05 MCG/DL (ref 2.68–18.4)
CREAT SERPL-MCNC: 1.1 MG/DL (ref 0.5–1)
CREAT SERPL-MCNC: 1.2 MG/DL (ref 0.5–1)
CREAT SERPL-MCNC: 1.3 MG/DL (ref 0.5–1)
CREAT SERPL-MCNC: 1.4 MG/DL (ref 0.5–1)
CREAT SERPL-MCNC: 1.5 MG/DL
CREAT SERPL-MCNC: 1.5 MG/DL (ref 0.5–1)
CREAT SERPL-MCNC: 1.6 MG/DL (ref 0.5–1)
CREAT SERPL-MCNC: 1.7 MG/DL
CREAT SERPL-MCNC: 1.7 MG/DL (ref 0.5–1)
CREAT SERPL-MCNC: 1.9 MG/DL (ref 0.5–1)
CREAT SERPL-MCNC: 2.2 MG/DL (ref 0.5–1)
CREAT SERPL-MCNC: 2.4 MG/DL (ref 0.5–1)
CREAT SERPL-MCNC: 2.7 MG/DL (ref 0.5–1)
CREAT SERPL-MCNC: 3.3 MG/DL (ref 0.5–1)
CREAT SERPL-MCNC: 3.6 MG/DL (ref 0.5–1)
CREAT SERPL-MCNC: 3.9 MG/DL (ref 0.5–1)
CREAT SERPL-MCNC: 4.2 MG/DL (ref 0.5–1)
CREAT SERPL-MCNC: 4.7 MG/DL (ref 0.5–1)
CREAT SERPL-MCNC: 6.6 MG/DL (ref 0.5–1)
CREATININE URINE: 29 MG/DL (ref 29–226)
CULTURE, BLOOD 2: NORMAL
EKG ATRIAL RATE: 416 BPM
EKG ATRIAL RATE: 66 BPM
EKG ATRIAL RATE: 69 BPM
EKG ATRIAL RATE: 90 BPM
EKG P AXIS: 136 DEGREES
EKG P AXIS: 23 DEGREES
EKG P AXIS: 53 DEGREES
EKG P AXIS: 66 DEGREES
EKG P-R INTERVAL: 132 MS
EKG P-R INTERVAL: 136 MS
EKG P-R INTERVAL: 144 MS
EKG Q-T INTERVAL: 368 MS
EKG Q-T INTERVAL: 414 MS
EKG Q-T INTERVAL: 432 MS
EKG Q-T INTERVAL: 478 MS
EKG QRS DURATION: 64 MS
EKG QRS DURATION: 68 MS
EKG QRS DURATION: 78 MS
EKG QRS DURATION: 78 MS
EKG QTC CALCULATION (BAZETT): 450 MS
EKG QTC CALCULATION (BAZETT): 452 MS
EKG QTC CALCULATION (BAZETT): 512 MS
EKG QTC CALCULATION (BAZETT): 585 MS
EKG R AXIS: -16 DEGREES
EKG R AXIS: 5 DEGREES
EKG R AXIS: 8 DEGREES
EKG T AXIS: -64 DEGREES
EKG T AXIS: 31 DEGREES
EKG T AXIS: 49 DEGREES
EKG T AXIS: 8 DEGREES
EKG VENTRICULAR RATE: 120 BPM
EKG VENTRICULAR RATE: 66 BPM
EKG VENTRICULAR RATE: 69 BPM
EKG VENTRICULAR RATE: 90 BPM
EOSINOPHILS ABSOLUTE: 0 E9/L (ref 0.05–0.5)
EOSINOPHILS ABSOLUTE: 0.1 /ΜL
EOSINOPHILS ABSOLUTE: 0.1 E9/L (ref 0.05–0.5)
EOSINOPHILS ABSOLUTE: 0.12 E9/L (ref 0.05–0.5)
EOSINOPHILS ABSOLUTE: 0.16 E9/L (ref 0.05–0.5)
EOSINOPHILS ABSOLUTE: 0.21 E9/L (ref 0.05–0.5)
EOSINOPHILS ABSOLUTE: 0.21 E9/L (ref 0.05–0.5)
EOSINOPHILS ABSOLUTE: 0.25 E9/L (ref 0.05–0.5)
EOSINOPHILS ABSOLUTE: 0.32 E9/L (ref 0.05–0.5)
EOSINOPHILS ABSOLUTE: 0.33 E9/L (ref 0.05–0.5)
EOSINOPHILS RELATIVE PERCENT: 0.1 % (ref 0–6)
EOSINOPHILS RELATIVE PERCENT: 0.2 % (ref 0–6)
EOSINOPHILS RELATIVE PERCENT: 0.5 %
EOSINOPHILS RELATIVE PERCENT: 0.5 % (ref 0–6)
EOSINOPHILS RELATIVE PERCENT: 0.7 % (ref 0–6)
EOSINOPHILS RELATIVE PERCENT: 0.8 % (ref 0–6)
EOSINOPHILS RELATIVE PERCENT: 0.9 % (ref 0–6)
EOSINOPHILS RELATIVE PERCENT: 1 % (ref 0–6)
EOSINOPHILS RELATIVE PERCENT: 1.3 % (ref 0–6)
EOSINOPHILS RELATIVE PERCENT: 1.9 % (ref 0–6)
EOSINOPHILS RELATIVE PERCENT: 2.7 % (ref 0–6)
EOSINOPHILS RELATIVE PERCENT: 3.4 % (ref 0–6)
EPITHELIAL CELLS, UA: ABNORMAL /HPF
FOLATE: 9.1 NG/ML (ref 4.8–24.2)
GFR AFRICAN AMERICAN: 11
GFR AFRICAN AMERICAN: 11
GFR AFRICAN AMERICAN: 12
GFR AFRICAN AMERICAN: 13
GFR AFRICAN AMERICAN: 14
GFR AFRICAN AMERICAN: 16
GFR AFRICAN AMERICAN: 20
GFR AFRICAN AMERICAN: 23
GFR AFRICAN AMERICAN: 26
GFR AFRICAN AMERICAN: 30
GFR AFRICAN AMERICAN: 34
GFR AFRICAN AMERICAN: 37
GFR AFRICAN AMERICAN: 40
GFR AFRICAN AMERICAN: 43
GFR AFRICAN AMERICAN: 47
GFR AFRICAN AMERICAN: 51
GFR AFRICAN AMERICAN: 57
GFR AFRICAN AMERICAN: 7
GFR CALCULATED: 28
GFR CALCULATED: 33
GFR NON-AFRICAN AMERICAN: 10 ML/MIN/1.73
GFR NON-AFRICAN AMERICAN: 11 ML/MIN/1.73
GFR NON-AFRICAN AMERICAN: 12 ML/MIN/1.73
GFR NON-AFRICAN AMERICAN: 13 ML/MIN/1.73
GFR NON-AFRICAN AMERICAN: 17 ML/MIN/1.73
GFR NON-AFRICAN AMERICAN: 19 ML/MIN/1.73
GFR NON-AFRICAN AMERICAN: 21 ML/MIN/1.73
GFR NON-AFRICAN AMERICAN: 25 ML/MIN/1.73
GFR NON-AFRICAN AMERICAN: 28 ML/MIN/1.73
GFR NON-AFRICAN AMERICAN: 30 ML/MIN/1.73
GFR NON-AFRICAN AMERICAN: 33 ML/MIN/1.73
GFR NON-AFRICAN AMERICAN: 36 ML/MIN/1.73
GFR NON-AFRICAN AMERICAN: 39 ML/MIN/1.73
GFR NON-AFRICAN AMERICAN: 42 ML/MIN/1.73
GFR NON-AFRICAN AMERICAN: 47 ML/MIN/1.73
GFR NON-AFRICAN AMERICAN: 6 ML/MIN/1.73
GFR NON-AFRICAN AMERICAN: 9 ML/MIN/1.73
GFR NON-AFRICAN AMERICAN: 9 ML/MIN/1.73
GLUCOSE BLD-MCNC: 100 MG/DL (ref 74–99)
GLUCOSE BLD-MCNC: 111 MG/DL (ref 74–99)
GLUCOSE BLD-MCNC: 113 MG/DL (ref 74–99)
GLUCOSE BLD-MCNC: 117 MG/DL (ref 74–99)
GLUCOSE BLD-MCNC: 131 MG/DL (ref 74–99)
GLUCOSE BLD-MCNC: 136 MG/DL
GLUCOSE BLD-MCNC: 137 MG/DL (ref 74–99)
GLUCOSE BLD-MCNC: 142 MG/DL (ref 74–99)
GLUCOSE BLD-MCNC: 144 MG/DL (ref 74–99)
GLUCOSE BLD-MCNC: 144 MG/DL (ref 74–99)
GLUCOSE BLD-MCNC: 149 MG/DL (ref 74–99)
GLUCOSE BLD-MCNC: 159 MG/DL (ref 74–99)
GLUCOSE BLD-MCNC: 187 MG/DL (ref 74–99)
GLUCOSE BLD-MCNC: 213 MG/DL (ref 74–99)
GLUCOSE BLD-MCNC: 235 MG/DL (ref 74–99)
GLUCOSE BLD-MCNC: 269 MG/DL (ref 74–99)
GLUCOSE BLD-MCNC: 299 MG/DL (ref 74–99)
GLUCOSE BLD-MCNC: 345 MG/DL (ref 74–99)
GLUCOSE BLD-MCNC: 45 MG/DL (ref 74–99)
GLUCOSE BLD-MCNC: 53 MG/DL (ref 74–99)
GLUCOSE BLD-MCNC: 630 MG/DL (ref 74–99)
GLUCOSE BLD-MCNC: 79 MG/DL
GLUCOSE BLD-MCNC: 88 MG/DL (ref 74–99)
GLUCOSE BLD-MCNC: 88 MG/DL (ref 74–99)
GLUCOSE FASTING: 130 MG/DL (ref 74–99)
GLUCOSE FASTING: 53 MG/DL (ref 74–99)
GLUCOSE URINE: NEGATIVE
GLUCOSE URINE: NEGATIVE MG/DL
HBA1C MFR BLD: 7.3 % (ref 4–5.6)
HBA1C MFR BLD: 7.3 % (ref 4–5.6)
HBA1C MFR BLD: 7.4 % (ref 4–5.6)
HBA1C MFR BLD: 8.1 % (ref 4–5.6)
HCT VFR BLD CALC: 29.4 % (ref 34–48)
HCT VFR BLD CALC: 30 % (ref 34–48)
HCT VFR BLD CALC: 30.3 % (ref 34–48)
HCT VFR BLD CALC: 30.7 % (ref 34–48)
HCT VFR BLD CALC: 30.9 % (ref 34–48)
HCT VFR BLD CALC: 32.6 % (ref 34–48)
HCT VFR BLD CALC: 34.6 % (ref 34–48)
HCT VFR BLD CALC: 34.6 % (ref 34–48)
HCT VFR BLD CALC: 34.6 % (ref 36–46)
HCT VFR BLD CALC: 35.3 % (ref 34–48)
HCT VFR BLD CALC: 36.9 % (ref 34–48)
HCT VFR BLD CALC: 37.5 % (ref 34–48)
HCT VFR BLD CALC: 39.6 % (ref 34–48)
HCT VFR BLD CALC: 42 % (ref 34–48)
HDLC SERPL-MCNC: 72 MG/DL
HEMOGLOBIN: 10 G/DL (ref 11.5–15.5)
HEMOGLOBIN: 10 G/DL (ref 11.5–15.5)
HEMOGLOBIN: 10.4 G/DL (ref 11.5–15.5)
HEMOGLOBIN: 10.8 G/DL (ref 11.5–15.5)
HEMOGLOBIN: 11.2 G/DL (ref 11.5–15.5)
HEMOGLOBIN: 11.4 G/DL (ref 11.5–15.5)
HEMOGLOBIN: 11.4 G/DL (ref 11.5–15.5)
HEMOGLOBIN: 11.4 G/DL (ref 12–16)
HEMOGLOBIN: 12.1 G/DL (ref 11.5–15.5)
HEMOGLOBIN: 12.1 G/DL (ref 11.5–15.5)
HEMOGLOBIN: 13.1 G/DL (ref 11.5–15.5)
HEMOGLOBIN: 13.6 G/DL (ref 11.5–15.5)
HEMOGLOBIN: 9.4 G/DL (ref 11.5–15.5)
HEMOGLOBIN: 9.8 G/DL (ref 11.5–15.5)
IMMATURE GRANULOCYTES #: 0.06 E9/L
IMMATURE GRANULOCYTES #: 0.07 E9/L
IMMATURE GRANULOCYTES #: 0.11 E9/L
IMMATURE GRANULOCYTES #: 0.4 E9/L
IMMATURE GRANULOCYTES #: 0.87 E9/L
IMMATURE GRANULOCYTES %: 0.5 % (ref 0–5)
IMMATURE GRANULOCYTES %: 0.6 % (ref 0–5)
IMMATURE GRANULOCYTES %: 0.7 % (ref 0–5)
IMMATURE GRANULOCYTES %: 1.7 % (ref 0–5)
IMMATURE GRANULOCYTES %: 2.8 % (ref 0–5)
INR BLD: 1
KETONES, URINE: ABNORMAL MG/DL
KETONES, URINE: NEGATIVE
KETONES, URINE: NEGATIVE MG/DL
LACTIC ACID, SEPSIS: 2 MMOL/L (ref 0.5–1.9)
LACTIC ACID, SEPSIS: 4.8 MMOL/L (ref 0.5–1.9)
LACTIC ACID: 1.3 MMOL/L (ref 0.5–2.2)
LDL CHOLESTEROL CALCULATED: 42 MG/DL (ref 0–99)
LEUKOCYTE ESTERASE, URINE: ABNORMAL
LIPASE: 20 U/L (ref 13–60)
LIPASE: 20 U/L (ref 13–60)
LIPASE: 21 U/L (ref 13–60)
LYMPHOCYTES ABSOLUTE: 1.11 E9/L (ref 1.5–4)
LYMPHOCYTES ABSOLUTE: 1.28 E9/L (ref 1.5–4)
LYMPHOCYTES ABSOLUTE: 1.38 E9/L (ref 1.5–4)
LYMPHOCYTES ABSOLUTE: 1.5 /ΜL
LYMPHOCYTES ABSOLUTE: 1.5 E9/L (ref 1.5–4)
LYMPHOCYTES ABSOLUTE: 1.53 E9/L (ref 1.5–4)
LYMPHOCYTES ABSOLUTE: 1.56 E9/L (ref 1.5–4)
LYMPHOCYTES ABSOLUTE: 1.99 E9/L (ref 1.5–4)
LYMPHOCYTES ABSOLUTE: 2.04 E9/L (ref 1.5–4)
LYMPHOCYTES ABSOLUTE: 2.21 E9/L (ref 1.5–4)
LYMPHOCYTES ABSOLUTE: 2.27 E9/L (ref 1.5–4)
LYMPHOCYTES ABSOLUTE: 2.34 E9/L (ref 1.5–4)
LYMPHOCYTES ABSOLUTE: 2.37 E9/L (ref 1.5–4)
LYMPHOCYTES ABSOLUTE: 2.89 E9/L (ref 1.5–4)
LYMPHOCYTES RELATIVE PERCENT: 10.4 % (ref 20–42)
LYMPHOCYTES RELATIVE PERCENT: 11.6 %
LYMPHOCYTES RELATIVE PERCENT: 12.4 % (ref 20–42)
LYMPHOCYTES RELATIVE PERCENT: 12.8 % (ref 20–42)
LYMPHOCYTES RELATIVE PERCENT: 14.9 % (ref 20–42)
LYMPHOCYTES RELATIVE PERCENT: 16.9 % (ref 20–42)
LYMPHOCYTES RELATIVE PERCENT: 19.5 % (ref 20–42)
LYMPHOCYTES RELATIVE PERCENT: 20.3 % (ref 20–42)
LYMPHOCYTES RELATIVE PERCENT: 5.2 % (ref 20–42)
LYMPHOCYTES RELATIVE PERCENT: 5.6 % (ref 20–42)
LYMPHOCYTES RELATIVE PERCENT: 6.6 % (ref 20–42)
LYMPHOCYTES RELATIVE PERCENT: 7 % (ref 20–42)
LYMPHOCYTES RELATIVE PERCENT: 7.8 % (ref 20–42)
LYMPHOCYTES RELATIVE PERCENT: 7.8 % (ref 20–42)
MAGNESIUM: 1.5 MG/DL (ref 1.6–2.6)
MAGNESIUM: 1.7 MG/DL (ref 1.6–2.6)
MAGNESIUM: 1.9 MG/DL (ref 1.6–2.6)
MAGNESIUM: 2.1 MG/DL (ref 1.6–2.6)
MAGNESIUM: 2.2 MG/DL (ref 1.6–2.6)
MAGNESIUM: 2.3 MG/DL (ref 1.6–2.6)
MCH RBC QN AUTO: 32 PG (ref 26–35)
MCH RBC QN AUTO: 32.3 PG
MCH RBC QN AUTO: 32.4 PG (ref 26–35)
MCH RBC QN AUTO: 32.5 PG (ref 26–35)
MCH RBC QN AUTO: 32.5 PG (ref 26–35)
MCH RBC QN AUTO: 32.7 PG (ref 26–35)
MCH RBC QN AUTO: 32.8 PG (ref 26–35)
MCH RBC QN AUTO: 32.9 PG (ref 26–35)
MCH RBC QN AUTO: 33 PG (ref 26–35)
MCH RBC QN AUTO: 33.1 PG (ref 26–35)
MCH RBC QN AUTO: 33.2 PG (ref 26–35)
MCHC RBC AUTO-ENTMCNC: 32 % (ref 32–34.5)
MCHC RBC AUTO-ENTMCNC: 32.3 % (ref 32–34.5)
MCHC RBC AUTO-ENTMCNC: 32.3 % (ref 32–34.5)
MCHC RBC AUTO-ENTMCNC: 32.4 % (ref 32–34.5)
MCHC RBC AUTO-ENTMCNC: 32.4 % (ref 32–34.5)
MCHC RBC AUTO-ENTMCNC: 32.6 % (ref 32–34.5)
MCHC RBC AUTO-ENTMCNC: 32.7 % (ref 32–34.5)
MCHC RBC AUTO-ENTMCNC: 32.8 % (ref 32–34.5)
MCHC RBC AUTO-ENTMCNC: 32.9 % (ref 32–34.5)
MCHC RBC AUTO-ENTMCNC: 33 % (ref 32–34.5)
MCHC RBC AUTO-ENTMCNC: 33 G/DL
MCHC RBC AUTO-ENTMCNC: 33.1 % (ref 32–34.5)
MCHC RBC AUTO-ENTMCNC: 33.1 % (ref 32–34.5)
MCHC RBC AUTO-ENTMCNC: 33.7 % (ref 32–34.5)
MCV RBC AUTO: 100 FL (ref 80–99.9)
MCV RBC AUTO: 100.2 FL (ref 80–99.9)
MCV RBC AUTO: 100.3 FL (ref 80–99.9)
MCV RBC AUTO: 100.9 FL (ref 80–99.9)
MCV RBC AUTO: 101.1 FL (ref 80–99.9)
MCV RBC AUTO: 101.4 FL (ref 80–99.9)
MCV RBC AUTO: 101.4 FL (ref 80–99.9)
MCV RBC AUTO: 97.8 FL (ref 80–99.9)
MCV RBC AUTO: 98.1 FL
MCV RBC AUTO: 98.3 FL (ref 80–99.9)
MCV RBC AUTO: 98.4 FL (ref 80–99.9)
MCV RBC AUTO: 99.7 FL (ref 80–99.9)
METAMYELOCYTES RELATIVE PERCENT: 0.9 % (ref 0–1)
METAMYELOCYTES RELATIVE PERCENT: 0.9 % (ref 0–1)
METAMYELOCYTES RELATIVE PERCENT: 1.7 % (ref 0–1)
METAMYELOCYTES RELATIVE PERCENT: 1.8 % (ref 0–1)
METER GLUCOSE: 100 MG/DL (ref 74–99)
METER GLUCOSE: 102 MG/DL (ref 74–99)
METER GLUCOSE: 103 MG/DL (ref 74–99)
METER GLUCOSE: 104 MG/DL (ref 74–99)
METER GLUCOSE: 108 MG/DL (ref 74–99)
METER GLUCOSE: 110 MG/DL (ref 74–99)
METER GLUCOSE: 113 MG/DL (ref 74–99)
METER GLUCOSE: 113 MG/DL (ref 74–99)
METER GLUCOSE: 122 MG/DL (ref 74–99)
METER GLUCOSE: 123 MG/DL (ref 74–99)
METER GLUCOSE: 124 MG/DL (ref 74–99)
METER GLUCOSE: 127 MG/DL (ref 74–99)
METER GLUCOSE: 127 MG/DL (ref 74–99)
METER GLUCOSE: 143 MG/DL (ref 74–99)
METER GLUCOSE: 146 MG/DL (ref 74–99)
METER GLUCOSE: 151 MG/DL (ref 74–99)
METER GLUCOSE: 152 MG/DL (ref 74–99)
METER GLUCOSE: 152 MG/DL (ref 74–99)
METER GLUCOSE: 155 MG/DL (ref 74–99)
METER GLUCOSE: 159 MG/DL (ref 74–99)
METER GLUCOSE: 161 MG/DL (ref 74–99)
METER GLUCOSE: 169 MG/DL (ref 74–99)
METER GLUCOSE: 179 MG/DL (ref 74–99)
METER GLUCOSE: 188 MG/DL (ref 74–99)
METER GLUCOSE: 190 MG/DL (ref 74–99)
METER GLUCOSE: 197 MG/DL (ref 74–99)
METER GLUCOSE: 203 MG/DL (ref 74–99)
METER GLUCOSE: 205 MG/DL (ref 74–99)
METER GLUCOSE: 207 MG/DL (ref 74–99)
METER GLUCOSE: 211 MG/DL (ref 74–99)
METER GLUCOSE: 214 MG/DL (ref 74–99)
METER GLUCOSE: 220 MG/DL (ref 74–99)
METER GLUCOSE: 223 MG/DL (ref 74–99)
METER GLUCOSE: 225 MG/DL (ref 74–99)
METER GLUCOSE: 225 MG/DL (ref 74–99)
METER GLUCOSE: 227 MG/DL (ref 74–99)
METER GLUCOSE: 255 MG/DL (ref 74–99)
METER GLUCOSE: 256 MG/DL (ref 74–99)
METER GLUCOSE: 259 MG/DL (ref 74–99)
METER GLUCOSE: 277 MG/DL (ref 74–99)
METER GLUCOSE: 291 MG/DL (ref 74–99)
METER GLUCOSE: 293 MG/DL (ref 74–99)
METER GLUCOSE: 298 MG/DL (ref 74–99)
METER GLUCOSE: 334 MG/DL (ref 74–99)
METER GLUCOSE: 386 MG/DL (ref 74–99)
METER GLUCOSE: 59 MG/DL (ref 74–99)
METER GLUCOSE: 70 MG/DL (ref 74–99)
METER GLUCOSE: 77 MG/DL (ref 74–99)
METER GLUCOSE: 78 MG/DL (ref 74–99)
METER GLUCOSE: 84 MG/DL (ref 74–99)
METER GLUCOSE: 89 MG/DL (ref 74–99)
METER GLUCOSE: 95 MG/DL (ref 74–99)
METER GLUCOSE: 97 MG/DL (ref 74–99)
METER GLUCOSE: 98 MG/DL (ref 74–99)
METER GLUCOSE: 99 MG/DL (ref 74–99)
MONOCYTES ABSOLUTE: 0 E9/L (ref 0.1–0.95)
MONOCYTES ABSOLUTE: 0.39 E9/L (ref 0.1–0.95)
MONOCYTES ABSOLUTE: 0.72 E9/L (ref 0.1–0.95)
MONOCYTES ABSOLUTE: 0.75 E9/L (ref 0.1–0.95)
MONOCYTES ABSOLUTE: 0.82 E9/L (ref 0.1–0.95)
MONOCYTES ABSOLUTE: 0.95 E9/L (ref 0.1–0.95)
MONOCYTES ABSOLUTE: 1.1 E9/L (ref 0.1–0.95)
MONOCYTES ABSOLUTE: 1.27 E9/L (ref 0.1–0.95)
MONOCYTES ABSOLUTE: 1.34 E9/L (ref 0.1–0.95)
MONOCYTES ABSOLUTE: 1.34 E9/L (ref 0.1–0.95)
MONOCYTES ABSOLUTE: 1.4 /ΜL
MONOCYTES ABSOLUTE: 1.48 E9/L (ref 0.1–0.95)
MONOCYTES ABSOLUTE: 1.53 E9/L (ref 0.1–0.95)
MONOCYTES ABSOLUTE: 1.77 E9/L (ref 0.1–0.95)
MONOCYTES RELATIVE PERCENT: 1.8 % (ref 2–12)
MONOCYTES RELATIVE PERCENT: 10 % (ref 2–12)
MONOCYTES RELATIVE PERCENT: 10.3 % (ref 2–12)
MONOCYTES RELATIVE PERCENT: 10.6 % (ref 2–12)
MONOCYTES RELATIVE PERCENT: 10.9 %
MONOCYTES RELATIVE PERCENT: 11 % (ref 2–12)
MONOCYTES RELATIVE PERCENT: 2.7 % (ref 2–12)
MONOCYTES RELATIVE PERCENT: 3.5 % (ref 2–12)
MONOCYTES RELATIVE PERCENT: 3.5 % (ref 2–12)
MONOCYTES RELATIVE PERCENT: 4.8 % (ref 2–12)
MONOCYTES RELATIVE PERCENT: 4.8 % (ref 2–12)
MONOCYTES RELATIVE PERCENT: 5.6 % (ref 2–12)
MONOCYTES RELATIVE PERCENT: 7.8 % (ref 2–12)
MONOCYTES RELATIVE PERCENT: 8.4 % (ref 2–12)
MYELOCYTE PERCENT: 0.9 % (ref 0–0)
NEUTROPHILS ABSOLUTE: 11.14 E9/L (ref 1.8–7.3)
NEUTROPHILS ABSOLUTE: 16.64 E9/L (ref 1.8–7.3)
NEUTROPHILS ABSOLUTE: 17.57 E9/L (ref 1.8–7.3)
NEUTROPHILS ABSOLUTE: 17.93 E9/L (ref 1.8–7.3)
NEUTROPHILS ABSOLUTE: 19.23 E9/L (ref 1.8–7.3)
NEUTROPHILS ABSOLUTE: 20.02 E9/L (ref 1.8–7.3)
NEUTROPHILS ABSOLUTE: 20.15 E9/L (ref 1.8–7.3)
NEUTROPHILS ABSOLUTE: 20.49 E9/L (ref 1.8–7.3)
NEUTROPHILS ABSOLUTE: 26.24 E9/L (ref 1.8–7.3)
NEUTROPHILS ABSOLUTE: 6.52 E9/L (ref 1.8–7.3)
NEUTROPHILS ABSOLUTE: 7.93 E9/L (ref 1.8–7.3)
NEUTROPHILS ABSOLUTE: 9.12 E9/L (ref 1.8–7.3)
NEUTROPHILS ABSOLUTE: 9.12 E9/L (ref 1.8–7.3)
NEUTROPHILS ABSOLUTE: 9.5 /ΜL
NEUTROPHILS RELATIVE PERCENT: 66.2 % (ref 43–80)
NEUTROPHILS RELATIVE PERCENT: 66.7 % (ref 43–80)
NEUTROPHILS RELATIVE PERCENT: 70 % (ref 43–80)
NEUTROPHILS RELATIVE PERCENT: 73 % (ref 43–80)
NEUTROPHILS RELATIVE PERCENT: 74.5 % (ref 43–80)
NEUTROPHILS RELATIVE PERCENT: 75.6 %
NEUTROPHILS RELATIVE PERCENT: 83.2 % (ref 43–80)
NEUTROPHILS RELATIVE PERCENT: 84.3 % (ref 43–80)
NEUTROPHILS RELATIVE PERCENT: 84.7 % (ref 43–80)
NEUTROPHILS RELATIVE PERCENT: 85.2 % (ref 43–80)
NEUTROPHILS RELATIVE PERCENT: 87 % (ref 43–80)
NEUTROPHILS RELATIVE PERCENT: 87.2 % (ref 43–80)
NEUTROPHILS RELATIVE PERCENT: 89.6 % (ref 43–80)
NEUTROPHILS RELATIVE PERCENT: 90.4 % (ref 43–80)
NITRITE, URINE: NEGATIVE
NITRITE, URINE: POSITIVE
NITRITE, URINE: POSITIVE
ORGANISM: ABNORMAL
OVALOCYTES: ABNORMAL
PDW BLD-RTO: 12.8 FL (ref 11.5–15)
PDW BLD-RTO: 12.9 FL (ref 11.5–15)
PDW BLD-RTO: 13.1 FL (ref 11.5–15)
PDW BLD-RTO: 13.2 %
PDW BLD-RTO: 13.2 FL (ref 11.5–15)
PDW BLD-RTO: 13.6 FL (ref 11.5–15)
PDW BLD-RTO: 13.7 FL (ref 11.5–15)
PDW BLD-RTO: 13.8 FL (ref 11.5–15)
PDW BLD-RTO: 13.9 FL (ref 11.5–15)
PDW BLD-RTO: 13.9 FL (ref 11.5–15)
PDW BLD-RTO: 14.5 FL (ref 11.5–15)
PERFORMED ON: ABNORMAL
PH UA: 5 (ref 5–9)
PH UA: 5.5 (ref 5–9)
PH UA: 5.5 (ref 5–9)
PH UA: 6 (ref 4.5–8)
PH UA: 6 (ref 5–9)
PH UA: 6 (ref 5–9)
PHOSPHORUS: 1.6 MG/DL (ref 2.5–4.5)
PHOSPHORUS: 1.7 MG/DL (ref 2.5–4.5)
PHOSPHORUS: 2.1 MG/DL (ref 2.5–4.5)
PHOSPHORUS: 2.1 MG/DL (ref 2.5–4.5)
PHOSPHORUS: 2.7 MG/DL (ref 2.5–4.5)
PHOSPHORUS: 2.9 MG/DL (ref 2.5–4.5)
PHOSPHORUS: 4.3 MG/DL (ref 2.5–4.5)
PLATELET # BLD: 101 E9/L (ref 130–450)
PLATELET # BLD: 103 E9/L (ref 130–450)
PLATELET # BLD: 114 E9/L (ref 130–450)
PLATELET # BLD: 115 E9/L (ref 130–450)
PLATELET # BLD: 123 E9/L (ref 130–450)
PLATELET # BLD: 134 E9/L (ref 130–450)
PLATELET # BLD: 169 E9/L (ref 130–450)
PLATELET # BLD: 219 E9/L (ref 130–450)
PLATELET # BLD: 224 K/ΜL
PLATELET # BLD: 249 E9/L (ref 130–450)
PLATELET # BLD: 284 E9/L (ref 130–450)
PLATELET # BLD: 305 E9/L (ref 130–450)
PLATELET # BLD: 306 E9/L (ref 130–450)
PLATELET # BLD: 325 E9/L (ref 130–450)
PMV BLD AUTO: 10.4 FL (ref 7–12)
PMV BLD AUTO: 10.4 FL (ref 7–12)
PMV BLD AUTO: 10.6 FL (ref 7–12)
PMV BLD AUTO: 10.7 FL (ref 7–12)
PMV BLD AUTO: 10.7 FL (ref 7–12)
PMV BLD AUTO: 10.8 FL (ref 7–12)
PMV BLD AUTO: 10.9 FL (ref 7–12)
PMV BLD AUTO: 10.9 FL (ref 7–12)
PMV BLD AUTO: 11 FL (ref 7–12)
PMV BLD AUTO: 11.3 FL (ref 7–12)
PMV BLD AUTO: 11.4 FL (ref 7–12)
PMV BLD AUTO: 9.3 FL
POC CHLORIDE: 124 MMOL/L (ref 100–108)
POC CREATININE: 4.5 MG/DL (ref 0.5–1)
POC POTASSIUM: 4.1 MMOL/L (ref 3.5–5)
POC SODIUM: 149 MMOL/L (ref 132–146)
POIKILOCYTES: ABNORMAL
POLYCHROMASIA: ABNORMAL
POTASSIUM REFLEX MAGNESIUM: 3.7 MMOL/L (ref 3.5–5)
POTASSIUM REFLEX MAGNESIUM: 3.8 MMOL/L (ref 3.5–5)
POTASSIUM REFLEX MAGNESIUM: 5.1 MMOL/L (ref 3.5–5)
POTASSIUM SERPL-SCNC: 3 MMOL/L (ref 3.5–5)
POTASSIUM SERPL-SCNC: 3.1 MMOL/L (ref 3.5–5)
POTASSIUM SERPL-SCNC: 3.4 MMOL/L (ref 3.5–5)
POTASSIUM SERPL-SCNC: 3.5 MMOL/L (ref 3.5–5)
POTASSIUM SERPL-SCNC: 3.6 MMOL/L (ref 3.5–5)
POTASSIUM SERPL-SCNC: 3.7 MMOL/L (ref 3.5–5)
POTASSIUM SERPL-SCNC: 3.8 MMOL/L (ref 3.5–5)
POTASSIUM SERPL-SCNC: 4 MMOL/L (ref 3.5–5)
POTASSIUM SERPL-SCNC: 4.1 MMOL/L (ref 3.5–5)
POTASSIUM SERPL-SCNC: 4.2 MMOL/L (ref 3.5–5)
POTASSIUM SERPL-SCNC: 4.5 MMOL/L (ref 3.5–5)
POTASSIUM SERPL-SCNC: 4.6 MMOL/L (ref 3.5–5)
POTASSIUM SERPL-SCNC: 4.7 MMOL/L (ref 3.5–5)
POTASSIUM SERPL-SCNC: 4.8 MMOL/L
POTASSIUM SERPL-SCNC: 4.9 MMOL/L
POTASSIUM SERPL-SCNC: 5.1 MMOL/L (ref 3.5–5)
POTASSIUM, UR: 13 MMOL/L
PRO-BNP: 2517 PG/ML (ref 0–450)
PROCALCITONIN: 0.17 NG/ML (ref 0–0.08)
PROCALCITONIN: 0.19 NG/ML (ref 0–0.08)
PROTEIN UA: 30 MG/DL
PROTEIN UA: 30 MG/DL
PROTEIN UA: ABNORMAL
PROTEIN UA: NEGATIVE MG/DL
PROTHROMBIN TIME: 11.6 SEC (ref 9.3–12.4)
RBC # BLD: 2.94 E12/L (ref 3.5–5.5)
RBC # BLD: 3 E12/L (ref 3.5–5.5)
RBC # BLD: 3.06 E12/L (ref 3.5–5.5)
RBC # BLD: 3.08 E12/L (ref 3.5–5.5)
RBC # BLD: 3.16 E12/L (ref 3.5–5.5)
RBC # BLD: 3.27 E12/L (ref 3.5–5.5)
RBC # BLD: 3.43 E12/L (ref 3.5–5.5)
RBC # BLD: 3.48 E12/L (ref 3.5–5.5)
RBC # BLD: 3.52 E12/L (ref 3.5–5.5)
RBC # BLD: 3.53 10^6/ΜL
RBC # BLD: 3.65 E12/L (ref 3.5–5.5)
RBC # BLD: 3.7 E12/L (ref 3.5–5.5)
RBC # BLD: 3.96 E12/L (ref 3.5–5.5)
RBC # BLD: 4.19 E12/L (ref 3.5–5.5)
RBC UA: >20 /HPF (ref 0–2)
RBC UA: ABNORMAL /HPF (ref 0–2)
RBC UA: NORMAL /HPF (ref 0–2)
SARS-COV-2, NAAT: NOT DETECTED
SARS-COV-2: NOT DETECTED
SCHISTOCYTES: ABNORMAL
SODIUM BLD-SCNC: 137 MMOL/L (ref 132–146)
SODIUM BLD-SCNC: 138 MMOL/L (ref 132–146)
SODIUM BLD-SCNC: 139 MMOL/L (ref 132–146)
SODIUM BLD-SCNC: 140 MMOL/L
SODIUM BLD-SCNC: 140 MMOL/L (ref 132–146)
SODIUM BLD-SCNC: 142 MMOL/L
SODIUM BLD-SCNC: 142 MMOL/L (ref 132–146)
SODIUM BLD-SCNC: 143 MMOL/L (ref 132–146)
SODIUM BLD-SCNC: 144 MMOL/L (ref 132–146)
SODIUM BLD-SCNC: 145 MMOL/L (ref 132–146)
SODIUM BLD-SCNC: 147 MMOL/L (ref 132–146)
SODIUM BLD-SCNC: 148 MMOL/L (ref 132–146)
SODIUM BLD-SCNC: 149 MMOL/L (ref 132–146)
SODIUM BLD-SCNC: 150 MMOL/L (ref 132–146)
SODIUM BLD-SCNC: 150 MMOL/L (ref 132–146)
SODIUM BLD-SCNC: 151 MMOL/L (ref 132–146)
SODIUM BLD-SCNC: 152 MMOL/L (ref 132–146)
SODIUM URINE: 93 MMOL/L
SOURCE: NORMAL
SPECIFIC GRAVITY UA: 1.01 (ref 1–1.03)
SPECIFIC GRAVITY UA: 1.02 (ref 1–1.03)
SPECIFIC GRAVITY, URINE: 1.01
T3 REVERSE: 49.1 NG/DL (ref 9–27)
T4 FREE: 0.97 NG/DL (ref 0.93–1.7)
TARGET CELLS: ABNORMAL
TEAR DROP CELLS: ABNORMAL
TOTAL CK: 627 U/L (ref 20–180)
TOTAL CK: 632 U/L (ref 20–180)
TOTAL PROTEIN: 4.5 G/DL (ref 6.4–8.3)
TOTAL PROTEIN: 4.5 G/DL (ref 6.4–8.3)
TOTAL PROTEIN: 4.7 G/DL (ref 6.4–8.3)
TOTAL PROTEIN: 4.8 G/DL (ref 6.4–8.3)
TOTAL PROTEIN: 4.9 G/DL (ref 6.4–8.3)
TOTAL PROTEIN: 5.1 G/DL (ref 6.4–8.3)
TOTAL PROTEIN: 5.3 G/DL (ref 6.4–8.3)
TOTAL PROTEIN: 6.5
TOTAL PROTEIN: 6.6 G/DL (ref 6.4–8.3)
TOTAL PROTEIN: 7.3 G/DL (ref 6.4–8.3)
TRIGL SERPL-MCNC: 60 MG/DL (ref 0–149)
TROPONIN: 0.08 NG/ML (ref 0–0.03)
TROPONIN: 0.21 NG/ML (ref 0–0.03)
TROPONIN: 0.23 NG/ML (ref 0–0.03)
TROPONIN: 0.24 NG/ML (ref 0–0.03)
TROPONIN: 0.25 NG/ML (ref 0–0.03)
TROPONIN: 0.3 NG/ML (ref 0–0.03)
TROPONIN: 0.39 NG/ML (ref 0–0.03)
TSH SERPL DL<=0.05 MIU/L-ACNC: 1.71 UIU/ML (ref 0.27–4.2)
TSH SERPL DL<=0.05 MIU/L-ACNC: 3.14 UIU/ML (ref 0.27–4.2)
TSH SERPL DL<=0.05 MIU/L-ACNC: 3.68 UIU/ML (ref 0.27–4.2)
TSH SERPL DL<=0.05 MIU/L-ACNC: 9.55 UIU/ML (ref 0.27–4.2)
UREA NITROGEN, UR: 383 MG/DL (ref 800–1666)
URINE CULTURE, ROUTINE: ABNORMAL
UROBILINOGEN, URINE: 0.2 E.U./DL
UROBILINOGEN, URINE: NORMAL
VITAMIN B-12: 657 PG/ML (ref 211–946)
VITAMIN D 25-HYDROXY: 41 NG/ML (ref 30–100)
VITAMIN D 25-HYDROXY: 56 NG/ML (ref 30–100)
VLDLC SERPL CALC-MCNC: 12 MG/DL
WBC # BLD: 12 E9/L (ref 4.5–11.5)
WBC # BLD: 12.2 E9/L (ref 4.5–11.5)
WBC # BLD: 12.6 10^3/ML
WBC # BLD: 13 E9/L (ref 4.5–11.5)
WBC # BLD: 15.3 E9/L (ref 4.5–11.5)
WBC # BLD: 18.7 E9/L (ref 4.5–11.5)
WBC # BLD: 19.1 E9/L (ref 4.5–11.5)
WBC # BLD: 19.7 E9/L (ref 4.5–11.5)
WBC # BLD: 22.1 E9/L (ref 4.5–11.5)
WBC # BLD: 23 E9/L (ref 4.5–11.5)
WBC # BLD: 23.7 E9/L (ref 4.5–11.5)
WBC # BLD: 24.1 E9/L (ref 4.5–11.5)
WBC # BLD: 31 E9/L (ref 4.5–11.5)
WBC # BLD: 9.8 E9/L (ref 4.5–11.5)
WBC UA: >20 /HPF (ref 0–5)
WBC UA: ABNORMAL /HPF (ref 0–5)
WBC UA: NORMAL /HPF (ref 0–5)
WOUND/ABSCESS: ABNORMAL

## 2020-01-01 PROCEDURE — 6360000002 HC RX W HCPCS: Performed by: EMERGENCY MEDICINE

## 2020-01-01 PROCEDURE — 36415 COLL VENOUS BLD VENIPUNCTURE: CPT

## 2020-01-01 PROCEDURE — U0003 INFECTIOUS AGENT DETECTION BY NUCLEIC ACID (DNA OR RNA); SEVERE ACUTE RESPIRATORY SYNDROME CORONAVIRUS 2 (SARS-COV-2) (CORONAVIRUS DISEASE [COVID-19]), AMPLIFIED PROBE TECHNIQUE, MAKING USE OF HIGH THROUGHPUT TECHNOLOGIES AS DESCRIBED BY CMS-2020-01-R: HCPCS

## 2020-01-01 PROCEDURE — 82962 GLUCOSE BLOOD TEST: CPT

## 2020-01-01 PROCEDURE — 87040 BLOOD CULTURE FOR BACTERIA: CPT

## 2020-01-01 PROCEDURE — G0378 HOSPITAL OBSERVATION PER HR: HCPCS

## 2020-01-01 PROCEDURE — 6370000000 HC RX 637 (ALT 250 FOR IP): Performed by: INTERNAL MEDICINE

## 2020-01-01 PROCEDURE — 6360000002 HC RX W HCPCS: Performed by: INTERNAL MEDICINE

## 2020-01-01 PROCEDURE — 81001 URINALYSIS AUTO W/SCOPE: CPT

## 2020-01-01 PROCEDURE — 2580000003 HC RX 258: Performed by: INTERNAL MEDICINE

## 2020-01-01 PROCEDURE — 2709999900 HC NON-CHARGEABLE SUPPLY: Performed by: NEUROLOGICAL SURGERY

## 2020-01-01 PROCEDURE — 7100000000 HC PACU RECOVERY - FIRST 15 MIN: Performed by: NEUROLOGICAL SURGERY

## 2020-01-01 PROCEDURE — 80053 COMPREHEN METABOLIC PANEL: CPT

## 2020-01-01 PROCEDURE — 84300 ASSAY OF URINE SODIUM: CPT

## 2020-01-01 PROCEDURE — 97530 THERAPEUTIC ACTIVITIES: CPT

## 2020-01-01 PROCEDURE — 83735 ASSAY OF MAGNESIUM: CPT

## 2020-01-01 PROCEDURE — 0PU43JZ SUPPLEMENT THORACIC VERTEBRA WITH SYNTHETIC SUBSTITUTE, PERCUTANEOUS APPROACH: ICD-10-PCS | Performed by: NEUROLOGICAL SURGERY

## 2020-01-01 PROCEDURE — 83036 HEMOGLOBIN GLYCOSYLATED A1C: CPT

## 2020-01-01 PROCEDURE — 84100 ASSAY OF PHOSPHORUS: CPT

## 2020-01-01 PROCEDURE — 2000000000 HC ICU R&B

## 2020-01-01 PROCEDURE — 85025 COMPLETE CBC W/AUTO DIFF WBC: CPT

## 2020-01-01 PROCEDURE — 97535 SELF CARE MNGMENT TRAINING: CPT

## 2020-01-01 PROCEDURE — 87088 URINE BACTERIA CULTURE: CPT

## 2020-01-01 PROCEDURE — 96372 THER/PROPH/DIAG INJ SC/IM: CPT

## 2020-01-01 PROCEDURE — 85730 THROMBOPLASTIN TIME PARTIAL: CPT

## 2020-01-01 PROCEDURE — 2580000003 HC RX 258

## 2020-01-01 PROCEDURE — 2500000003 HC RX 250 WO HCPCS: Performed by: NEUROLOGICAL SURGERY

## 2020-01-01 PROCEDURE — 80061 LIPID PANEL: CPT

## 2020-01-01 PROCEDURE — 36556 INSERT NON-TUNNEL CV CATH: CPT

## 2020-01-01 PROCEDURE — 0P943ZX DRAINAGE OF THORACIC VERTEBRA, PERCUTANEOUS APPROACH, DIAGNOSTIC: ICD-10-PCS | Performed by: NEUROLOGICAL SURGERY

## 2020-01-01 PROCEDURE — 93005 ELECTROCARDIOGRAM TRACING: CPT | Performed by: EMERGENCY MEDICINE

## 2020-01-01 PROCEDURE — 84443 ASSAY THYROID STIM HORMONE: CPT

## 2020-01-01 PROCEDURE — 3700000001 HC ADD 15 MINUTES (ANESTHESIA): Performed by: NEUROLOGICAL SURGERY

## 2020-01-01 PROCEDURE — 84145 PROCALCITONIN (PCT): CPT

## 2020-01-01 PROCEDURE — 1200000000 HC SEMI PRIVATE

## 2020-01-01 PROCEDURE — 6370000000 HC RX 637 (ALT 250 FOR IP): Performed by: NEUROLOGICAL SURGERY

## 2020-01-01 PROCEDURE — 80048 BASIC METABOLIC PNL TOTAL CA: CPT

## 2020-01-01 PROCEDURE — 72125 CT NECK SPINE W/O DYE: CPT

## 2020-01-01 PROCEDURE — 70450 CT HEAD/BRAIN W/O DYE: CPT

## 2020-01-01 PROCEDURE — 87186 SC STD MICRODIL/AGAR DIL: CPT

## 2020-01-01 PROCEDURE — 99285 EMERGENCY DEPT VISIT HI MDM: CPT

## 2020-01-01 PROCEDURE — 6360000002 HC RX W HCPCS: Performed by: NEUROLOGICAL SURGERY

## 2020-01-01 PROCEDURE — 82306 VITAMIN D 25 HYDROXY: CPT

## 2020-01-01 PROCEDURE — 97110 THERAPEUTIC EXERCISES: CPT

## 2020-01-01 PROCEDURE — 93010 ELECTROCARDIOGRAM REPORT: CPT | Performed by: INTERNAL MEDICINE

## 2020-01-01 PROCEDURE — 83690 ASSAY OF LIPASE: CPT

## 2020-01-01 PROCEDURE — 88307 TISSUE EXAM BY PATHOLOGIST: CPT

## 2020-01-01 PROCEDURE — 82947 ASSAY GLUCOSE BLOOD QUANT: CPT

## 2020-01-01 PROCEDURE — 73564 X-RAY EXAM KNEE 4 OR MORE: CPT

## 2020-01-01 PROCEDURE — 74018 RADEX ABDOMEN 1 VIEW: CPT

## 2020-01-01 PROCEDURE — 96374 THER/PROPH/DIAG INJ IV PUSH: CPT

## 2020-01-01 PROCEDURE — 93005 ELECTROCARDIOGRAM TRACING: CPT | Performed by: INTERNAL MEDICINE

## 2020-01-01 PROCEDURE — 87081 CULTURE SCREEN ONLY: CPT

## 2020-01-01 PROCEDURE — U0002 COVID-19 LAB TEST NON-CDC: HCPCS

## 2020-01-01 PROCEDURE — 51702 INSERT TEMP BLADDER CATH: CPT

## 2020-01-01 PROCEDURE — 71045 X-RAY EXAM CHEST 1 VIEW: CPT

## 2020-01-01 PROCEDURE — 69210 REMOVE IMPACTED EAR WAX UNI: CPT | Performed by: FAMILY MEDICINE

## 2020-01-01 PROCEDURE — 36592 COLLECT BLOOD FROM PICC: CPT

## 2020-01-01 PROCEDURE — 3700000000 HC ANESTHESIA ATTENDED CARE: Performed by: NEUROLOGICAL SURGERY

## 2020-01-01 PROCEDURE — 86901 BLOOD TYPING SEROLOGIC RH(D): CPT

## 2020-01-01 PROCEDURE — 3051F HG A1C>EQUAL 7.0%<8.0%: CPT | Performed by: FAMILY MEDICINE

## 2020-01-01 PROCEDURE — 72070 X-RAY EXAM THORAC SPINE 2VWS: CPT

## 2020-01-01 PROCEDURE — 92610 EVALUATE SWALLOWING FUNCTION: CPT | Performed by: SPEECH-LANGUAGE PATHOLOGIST

## 2020-01-01 PROCEDURE — 82553 CREATINE MB FRACTION: CPT

## 2020-01-01 PROCEDURE — 86900 BLOOD TYPING SEROLOGIC ABO: CPT

## 2020-01-01 PROCEDURE — 84295 ASSAY OF SERUM SODIUM: CPT

## 2020-01-01 PROCEDURE — 3600000004 HC SURGERY LEVEL 4 BASE: Performed by: NEUROLOGICAL SURGERY

## 2020-01-01 PROCEDURE — 6370000000 HC RX 637 (ALT 250 FOR IP): Performed by: EMERGENCY MEDICINE

## 2020-01-01 PROCEDURE — 2500000003 HC RX 250 WO HCPCS

## 2020-01-01 PROCEDURE — 84540 ASSAY OF URINE/UREA-N: CPT

## 2020-01-01 PROCEDURE — 6360000002 HC RX W HCPCS

## 2020-01-01 PROCEDURE — 97166 OT EVAL MOD COMPLEX 45 MIN: CPT

## 2020-01-01 PROCEDURE — 73700 CT LOWER EXTREMITY W/O DYE: CPT

## 2020-01-01 PROCEDURE — 2500000003 HC RX 250 WO HCPCS: Performed by: INTERNAL MEDICINE

## 2020-01-01 PROCEDURE — 6360000002 HC RX W HCPCS: Performed by: ANESTHESIOLOGY

## 2020-01-01 PROCEDURE — 73552 X-RAY EXAM OF FEMUR 2/>: CPT

## 2020-01-01 PROCEDURE — 92611 MOTION FLUOROSCOPY/SWALLOW: CPT

## 2020-01-01 PROCEDURE — 2580000003 HC RX 258: Performed by: EMERGENCY MEDICINE

## 2020-01-01 PROCEDURE — 87070 CULTURE OTHR SPECIMN AEROBIC: CPT

## 2020-01-01 PROCEDURE — 82607 VITAMIN B-12: CPT

## 2020-01-01 PROCEDURE — 99223 1ST HOSP IP/OBS HIGH 75: CPT | Performed by: NURSE PRACTITIONER

## 2020-01-01 PROCEDURE — 82140 ASSAY OF AMMONIA: CPT

## 2020-01-01 PROCEDURE — 82565 ASSAY OF CREATININE: CPT

## 2020-01-01 PROCEDURE — 82746 ASSAY OF FOLIC ACID SERUM: CPT

## 2020-01-01 PROCEDURE — C1713 ANCHOR/SCREW BN/BN,TIS/BN: HCPCS | Performed by: NEUROLOGICAL SURGERY

## 2020-01-01 PROCEDURE — 73502 X-RAY EXAM HIP UNI 2-3 VIEWS: CPT

## 2020-01-01 PROCEDURE — 97162 PT EVAL MOD COMPLEX 30 MIN: CPT

## 2020-01-01 PROCEDURE — 74176 CT ABD & PELVIS W/O CONTRAST: CPT

## 2020-01-01 PROCEDURE — 87077 CULTURE AEROBIC IDENTIFY: CPT

## 2020-01-01 PROCEDURE — 76770 US EXAM ABDO BACK WALL COMP: CPT

## 2020-01-01 PROCEDURE — 82435 ASSAY OF BLOOD CHLORIDE: CPT

## 2020-01-01 PROCEDURE — 84484 ASSAY OF TROPONIN QUANT: CPT

## 2020-01-01 PROCEDURE — 72148 MRI LUMBAR SPINE W/O DYE: CPT

## 2020-01-01 PROCEDURE — 0PS43ZZ REPOSITION THORACIC VERTEBRA, PERCUTANEOUS APPROACH: ICD-10-PCS | Performed by: NEUROLOGICAL SURGERY

## 2020-01-01 PROCEDURE — 82533 TOTAL CORTISOL: CPT

## 2020-01-01 PROCEDURE — 2500000003 HC RX 250 WO HCPCS: Performed by: PHYSICIAN ASSISTANT

## 2020-01-01 PROCEDURE — 7100000001 HC PACU RECOVERY - ADDTL 15 MIN: Performed by: NEUROLOGICAL SURGERY

## 2020-01-01 PROCEDURE — 72128 CT CHEST SPINE W/O DYE: CPT

## 2020-01-01 PROCEDURE — 3209999900 FLUORO FOR SURGICAL PROCEDURES

## 2020-01-01 PROCEDURE — 88311 DECALCIFY TISSUE: CPT

## 2020-01-01 PROCEDURE — 3E0T3BZ INTRODUCTION OF ANESTHETIC AGENT INTO PERIPHERAL NERVES AND PLEXI, PERCUTANEOUS APPROACH: ICD-10-PCS | Performed by: NEUROLOGICAL SURGERY

## 2020-01-01 PROCEDURE — 82570 ASSAY OF URINE CREATININE: CPT

## 2020-01-01 PROCEDURE — 85610 PROTHROMBIN TIME: CPT

## 2020-01-01 PROCEDURE — 83880 ASSAY OF NATRIURETIC PEPTIDE: CPT

## 2020-01-01 PROCEDURE — 96360 HYDRATION IV INFUSION INIT: CPT

## 2020-01-01 PROCEDURE — 72131 CT LUMBAR SPINE W/O DYE: CPT

## 2020-01-01 PROCEDURE — 81003 URINALYSIS AUTO W/O SCOPE: CPT | Performed by: FAMILY MEDICINE

## 2020-01-01 PROCEDURE — 82550 ASSAY OF CK (CPK): CPT

## 2020-01-01 PROCEDURE — 83605 ASSAY OF LACTIC ACID: CPT

## 2020-01-01 PROCEDURE — 84439 ASSAY OF FREE THYROXINE: CPT

## 2020-01-01 PROCEDURE — 72146 MRI CHEST SPINE W/O DYE: CPT

## 2020-01-01 PROCEDURE — 74230 X-RAY XM SWLNG FUNCJ C+: CPT

## 2020-01-01 PROCEDURE — 2580000003 HC RX 258: Performed by: NEUROLOGICAL SURGERY

## 2020-01-01 PROCEDURE — 84482 T3 REVERSE: CPT

## 2020-01-01 PROCEDURE — 99490 CHRNC CARE MGMT STAFF 1ST 20: CPT | Performed by: FAMILY MEDICINE

## 2020-01-01 PROCEDURE — 84133 ASSAY OF URINE POTASSIUM: CPT

## 2020-01-01 PROCEDURE — 73521 X-RAY EXAM HIPS BI 2 VIEWS: CPT

## 2020-01-01 PROCEDURE — 99284 EMERGENCY DEPT VISIT MOD MDM: CPT

## 2020-01-01 PROCEDURE — 84132 ASSAY OF SERUM POTASSIUM: CPT

## 2020-01-01 PROCEDURE — 92526 ORAL FUNCTION THERAPY: CPT | Performed by: SPEECH-LANGUAGE PATHOLOGIST

## 2020-01-01 PROCEDURE — 3600000014 HC SURGERY LEVEL 4 ADDTL 15MIN: Performed by: NEUROLOGICAL SURGERY

## 2020-01-01 PROCEDURE — 82436 ASSAY OF URINE CHLORIDE: CPT

## 2020-01-01 PROCEDURE — 86850 RBC ANTIBODY SCREEN: CPT

## 2020-01-01 DEVICE — BONE CEMENT C01B HV-R WITH MIXER  US
Type: IMPLANTABLE DEVICE | Status: FUNCTIONAL
Brand: KYPHON® HV-R® BONE CEMENT AND KYPHON® MIXER PACK

## 2020-01-01 RX ORDER — INSULIN GLARGINE 100 [IU]/ML
20 INJECTION, SOLUTION SUBCUTANEOUS 2 TIMES DAILY
Status: DISCONTINUED | OUTPATIENT
Start: 2020-01-01 | End: 2020-01-01 | Stop reason: HOSPADM

## 2020-01-01 RX ORDER — FENTANYL CITRATE 50 UG/ML
INJECTION, SOLUTION INTRAMUSCULAR; INTRAVENOUS PRN
Status: DISCONTINUED | OUTPATIENT
Start: 2020-01-01 | End: 2020-01-01 | Stop reason: SDUPTHER

## 2020-01-01 RX ORDER — LEVOFLOXACIN 250 MG/1
250 TABLET ORAL DAILY
Qty: 7 TABLET | Refills: 0 | Status: SHIPPED | OUTPATIENT
Start: 2020-01-01 | End: 2020-01-01

## 2020-01-01 RX ORDER — FENTANYL CITRATE 50 UG/ML
50 INJECTION, SOLUTION INTRAMUSCULAR; INTRAVENOUS EVERY 5 MIN PRN
Status: DISCONTINUED | OUTPATIENT
Start: 2020-01-01 | End: 2020-01-01 | Stop reason: HOSPADM

## 2020-01-01 RX ORDER — SODIUM CHLORIDE 0.9 % (FLUSH) 0.9 %
10 SYRINGE (ML) INJECTION PRN
Status: DISCONTINUED | OUTPATIENT
Start: 2020-01-01 | End: 2020-01-01 | Stop reason: HOSPADM

## 2020-01-01 RX ORDER — LEVOTHYROXINE SODIUM 88 UG/1
88 TABLET ORAL DAILY
Status: DISCONTINUED | OUTPATIENT
Start: 2020-01-01 | End: 2020-01-01 | Stop reason: HOSPADM

## 2020-01-01 RX ORDER — LANOLIN ALCOHOL/MO/W.PET/CERES
3 CREAM (GRAM) TOPICAL NIGHTLY PRN
Status: DISCONTINUED | OUTPATIENT
Start: 2020-01-01 | End: 2020-01-01 | Stop reason: HOSPADM

## 2020-01-01 RX ORDER — PROMETHAZINE HYDROCHLORIDE 25 MG/ML
6.25 INJECTION, SOLUTION INTRAMUSCULAR; INTRAVENOUS
Status: DISCONTINUED | OUTPATIENT
Start: 2020-01-01 | End: 2020-01-01 | Stop reason: HOSPADM

## 2020-01-01 RX ORDER — DEXTROSE MONOHYDRATE 25 G/50ML
12.5 INJECTION, SOLUTION INTRAVENOUS PRN
Status: DISCONTINUED | OUTPATIENT
Start: 2020-01-01 | End: 2020-01-01 | Stop reason: HOSPADM

## 2020-01-01 RX ORDER — TRAMADOL HYDROCHLORIDE 50 MG/1
50 TABLET ORAL EVERY 8 HOURS PRN
Status: ON HOLD | COMMUNITY
End: 2020-01-01 | Stop reason: HOSPADM

## 2020-01-01 RX ORDER — METOPROLOL SUCCINATE 50 MG/1
50 TABLET, EXTENDED RELEASE ORAL DAILY
Status: DISCONTINUED | OUTPATIENT
Start: 2020-01-01 | End: 2020-01-01 | Stop reason: HOSPADM

## 2020-01-01 RX ORDER — 0.9 % SODIUM CHLORIDE 0.9 %
30 INTRAVENOUS SOLUTION INTRAVENOUS ONCE
Status: COMPLETED | OUTPATIENT
Start: 2020-01-01 | End: 2020-01-01

## 2020-01-01 RX ORDER — INSULIN GLARGINE 100 [IU]/ML
30 INJECTION, SOLUTION SUBCUTANEOUS 2 TIMES DAILY
Status: DISCONTINUED | OUTPATIENT
Start: 2020-01-01 | End: 2020-01-01

## 2020-01-01 RX ORDER — POTASSIUM CHLORIDE 7.45 MG/ML
10 INJECTION INTRAVENOUS
Status: COMPLETED | OUTPATIENT
Start: 2020-01-01 | End: 2020-01-01

## 2020-01-01 RX ORDER — SODIUM CHLORIDE 9 MG/ML
INJECTION, SOLUTION INTRAVENOUS CONTINUOUS PRN
Status: DISCONTINUED | OUTPATIENT
Start: 2020-01-01 | End: 2020-01-01 | Stop reason: SDUPTHER

## 2020-01-01 RX ORDER — POLYETHYLENE GLYCOL 3350 17 G/17G
17 POWDER, FOR SOLUTION ORAL DAILY PRN
Status: DISCONTINUED | OUTPATIENT
Start: 2020-01-01 | End: 2020-01-01 | Stop reason: HOSPADM

## 2020-01-01 RX ORDER — INSULIN GLARGINE 100 [IU]/ML
35 INJECTION, SOLUTION SUBCUTANEOUS NIGHTLY
Status: DISCONTINUED | OUTPATIENT
Start: 2020-01-01 | End: 2020-01-01

## 2020-01-01 RX ORDER — HYDROCODONE BITARTRATE AND ACETAMINOPHEN 5; 325 MG/1; MG/1
1 TABLET ORAL ONCE
Status: COMPLETED | OUTPATIENT
Start: 2020-01-01 | End: 2020-01-01

## 2020-01-01 RX ORDER — GLYCOPYRROLATE 1 MG/5 ML
SYRINGE (ML) INTRAVENOUS PRN
Status: DISCONTINUED | OUTPATIENT
Start: 2020-01-01 | End: 2020-01-01 | Stop reason: SDUPTHER

## 2020-01-01 RX ORDER — MAGNESIUM SULFATE IN WATER 40 MG/ML
2 INJECTION, SOLUTION INTRAVENOUS ONCE
Status: DISCONTINUED | OUTPATIENT
Start: 2020-01-01 | End: 2020-01-01 | Stop reason: HOSPADM

## 2020-01-01 RX ORDER — PROPOFOL 10 MG/ML
INJECTION, EMULSION INTRAVENOUS PRN
Status: DISCONTINUED | OUTPATIENT
Start: 2020-01-01 | End: 2020-01-01 | Stop reason: SDUPTHER

## 2020-01-01 RX ORDER — HYDROCODONE BITARTRATE AND ACETAMINOPHEN 5; 325 MG/1; MG/1
1 TABLET ORAL EVERY 4 HOURS PRN
Status: DISCONTINUED | OUTPATIENT
Start: 2020-01-01 | End: 2020-01-01 | Stop reason: HOSPADM

## 2020-01-01 RX ORDER — INSULIN GLARGINE 100 [IU]/ML
30 INJECTION, SOLUTION SUBCUTANEOUS NIGHTLY
Status: DISCONTINUED | OUTPATIENT
Start: 2020-01-01 | End: 2020-01-01

## 2020-01-01 RX ORDER — ONDANSETRON 2 MG/ML
INJECTION INTRAMUSCULAR; INTRAVENOUS PRN
Status: DISCONTINUED | OUTPATIENT
Start: 2020-01-01 | End: 2020-01-01 | Stop reason: SDUPTHER

## 2020-01-01 RX ORDER — HEPARIN SODIUM 10000 [USP'U]/ML
5000 INJECTION, SOLUTION INTRAVENOUS; SUBCUTANEOUS EVERY 8 HOURS SCHEDULED
Status: DISCONTINUED | OUTPATIENT
Start: 2020-01-01 | End: 2020-01-01 | Stop reason: HOSPADM

## 2020-01-01 RX ORDER — ONDANSETRON 2 MG/ML
4 INJECTION INTRAMUSCULAR; INTRAVENOUS EVERY 6 HOURS PRN
Status: DISCONTINUED | OUTPATIENT
Start: 2020-01-01 | End: 2020-01-01 | Stop reason: HOSPADM

## 2020-01-01 RX ORDER — DEXTROSE MONOHYDRATE 50 MG/ML
100 INJECTION, SOLUTION INTRAVENOUS PRN
Status: DISCONTINUED | OUTPATIENT
Start: 2020-01-01 | End: 2020-01-01 | Stop reason: HOSPADM

## 2020-01-01 RX ORDER — SODIUM CHLORIDE 0.9 % (FLUSH) 0.9 %
10 SYRINGE (ML) INJECTION EVERY 12 HOURS SCHEDULED
Status: DISCONTINUED | OUTPATIENT
Start: 2020-01-01 | End: 2020-01-01 | Stop reason: HOSPADM

## 2020-01-01 RX ORDER — 0.9 % SODIUM CHLORIDE 0.9 %
1000 INTRAVENOUS SOLUTION INTRAVENOUS ONCE
Status: COMPLETED | OUTPATIENT
Start: 2020-01-01 | End: 2020-01-01

## 2020-01-01 RX ORDER — CLINDAMYCIN HYDROCHLORIDE 300 MG/1
300 CAPSULE ORAL 3 TIMES DAILY
Qty: 21 CAPSULE | Refills: 0 | Status: SHIPPED | OUTPATIENT
Start: 2020-01-01 | End: 2020-01-01

## 2020-01-01 RX ORDER — DEXTROSE 15 G/37.5G
GEL ORAL
Qty: 37.5 G | Refills: 10 | Status: SHIPPED | OUTPATIENT
Start: 2020-01-01

## 2020-01-01 RX ORDER — TORSEMIDE 5 MG/1
5 TABLET ORAL DAILY
Qty: 30 TABLET | Refills: 3 | Status: SHIPPED
Start: 2020-01-01 | End: 2020-01-01 | Stop reason: DRUGHIGH

## 2020-01-01 RX ORDER — ACETAMINOPHEN 325 MG/1
650 TABLET ORAL EVERY 6 HOURS PRN
Status: DISCONTINUED | OUTPATIENT
Start: 2020-01-01 | End: 2020-01-01 | Stop reason: HOSPADM

## 2020-01-01 RX ORDER — ACETAMINOPHEN 650 MG/1
650 SUPPOSITORY RECTAL EVERY 6 HOURS PRN
Status: DISCONTINUED | OUTPATIENT
Start: 2020-01-01 | End: 2020-01-01 | Stop reason: HOSPADM

## 2020-01-01 RX ORDER — DEXAMETHASONE SODIUM PHOSPHATE 10 MG/ML
INJECTION INTRAMUSCULAR; INTRAVENOUS PRN
Status: DISCONTINUED | OUTPATIENT
Start: 2020-01-01 | End: 2020-01-01 | Stop reason: SDUPTHER

## 2020-01-01 RX ORDER — INSULIN GLARGINE 100 [IU]/ML
INJECTION, SOLUTION SUBCUTANEOUS
Qty: 1 ML | Refills: 4 | Status: SHIPPED
Start: 2020-01-01 | End: 2020-01-01

## 2020-01-01 RX ORDER — INSULIN GLARGINE 100 [IU]/ML
30 INJECTION, SOLUTION SUBCUTANEOUS NIGHTLY
Status: DISCONTINUED | OUTPATIENT
Start: 2020-01-01 | End: 2020-01-01 | Stop reason: HOSPADM

## 2020-01-01 RX ORDER — DEXTROSE MONOHYDRATE 50 MG/ML
INJECTION, SOLUTION INTRAVENOUS CONTINUOUS
Status: DISCONTINUED | OUTPATIENT
Start: 2020-01-01 | End: 2020-01-01

## 2020-01-01 RX ORDER — SODIUM CHLORIDE 9 MG/ML
1000 INJECTION, SOLUTION INTRAVENOUS CONTINUOUS
Status: DISCONTINUED | OUTPATIENT
Start: 2020-01-01 | End: 2020-01-01

## 2020-01-01 RX ORDER — METHYLPREDNISOLONE ACETATE 80 MG/ML
INJECTION, SUSPENSION INTRA-ARTICULAR; INTRALESIONAL; INTRAMUSCULAR; SOFT TISSUE PRN
Status: DISCONTINUED | OUTPATIENT
Start: 2020-01-01 | End: 2020-01-01 | Stop reason: HOSPADM

## 2020-01-01 RX ORDER — DIPHENHYDRAMINE HYDROCHLORIDE 50 MG/ML
12.5 INJECTION INTRAMUSCULAR; INTRAVENOUS
Status: DISCONTINUED | OUTPATIENT
Start: 2020-01-01 | End: 2020-01-01 | Stop reason: HOSPADM

## 2020-01-01 RX ORDER — OXYCODONE HYDROCHLORIDE AND ACETAMINOPHEN 5; 325 MG/1; MG/1
1 TABLET ORAL
Status: DISCONTINUED | OUTPATIENT
Start: 2020-01-01 | End: 2020-01-01 | Stop reason: HOSPADM

## 2020-01-01 RX ORDER — MEMANTINE HYDROCHLORIDE 10 MG/1
10 TABLET ORAL 2 TIMES DAILY
Status: DISCONTINUED | OUTPATIENT
Start: 2020-01-01 | End: 2020-01-01 | Stop reason: HOSPADM

## 2020-01-01 RX ORDER — TORSEMIDE 10 MG/1
5 TABLET ORAL DAILY
Status: DISCONTINUED | OUTPATIENT
Start: 2020-01-01 | End: 2020-01-01 | Stop reason: HOSPADM

## 2020-01-01 RX ORDER — POTASSIUM CHLORIDE AND SODIUM CHLORIDE 450; 150 MG/100ML; MG/100ML
INJECTION, SOLUTION INTRAVENOUS CONTINUOUS
Status: DISCONTINUED | OUTPATIENT
Start: 2020-01-01 | End: 2020-01-01

## 2020-01-01 RX ORDER — PROMETHAZINE HYDROCHLORIDE 25 MG/1
12.5 TABLET ORAL EVERY 6 HOURS PRN
Status: DISCONTINUED | OUTPATIENT
Start: 2020-01-01 | End: 2020-01-01 | Stop reason: HOSPADM

## 2020-01-01 RX ORDER — MEPERIDINE HYDROCHLORIDE 25 MG/ML
12.5 INJECTION INTRAMUSCULAR; INTRAVENOUS; SUBCUTANEOUS EVERY 5 MIN PRN
Status: DISCONTINUED | OUTPATIENT
Start: 2020-01-01 | End: 2020-01-01 | Stop reason: HOSPADM

## 2020-01-01 RX ORDER — INSULIN GLARGINE 100 [IU]/ML
15 INJECTION, SOLUTION SUBCUTANEOUS ONCE
Status: COMPLETED | OUTPATIENT
Start: 2020-01-01 | End: 2020-01-01

## 2020-01-01 RX ORDER — CEPHALEXIN 500 MG/1
500 CAPSULE ORAL ONCE
Status: COMPLETED | OUTPATIENT
Start: 2020-01-01 | End: 2020-01-01

## 2020-01-01 RX ORDER — POTASSIUM CHLORIDE 29.8 MG/ML
40 INJECTION INTRAVENOUS ONCE
Status: COMPLETED | OUTPATIENT
Start: 2020-01-01 | End: 2020-01-01

## 2020-01-01 RX ORDER — MIDODRINE HYDROCHLORIDE 5 MG/1
5 TABLET ORAL
Qty: 90 TABLET | Refills: 3 | Status: SHIPPED | OUTPATIENT
Start: 2020-01-01

## 2020-01-01 RX ORDER — LISINOPRIL 5 MG/1
2.5 TABLET ORAL DAILY
Status: DISCONTINUED | OUTPATIENT
Start: 2020-01-01 | End: 2020-01-01 | Stop reason: HOSPADM

## 2020-01-01 RX ORDER — PHENYLEPHRINE HCL IN 0.9% NACL 1 MG/10 ML
SYRINGE (ML) INTRAVENOUS PRN
Status: DISCONTINUED | OUTPATIENT
Start: 2020-01-01 | End: 2020-01-01 | Stop reason: SDUPTHER

## 2020-01-01 RX ORDER — MEMANTINE HYDROCHLORIDE 10 MG/1
TABLET ORAL
Qty: 62 TABLET | Refills: 11 | Status: SHIPPED | OUTPATIENT
Start: 2020-01-01

## 2020-01-01 RX ORDER — FENTANYL CITRATE 50 UG/ML
25 INJECTION, SOLUTION INTRAMUSCULAR; INTRAVENOUS EVERY 5 MIN PRN
Status: DISCONTINUED | OUTPATIENT
Start: 2020-01-01 | End: 2020-01-01 | Stop reason: HOSPADM

## 2020-01-01 RX ORDER — NICOTINE POLACRILEX 4 MG
15 LOZENGE BUCCAL PRN
Status: DISCONTINUED | OUTPATIENT
Start: 2020-01-01 | End: 2020-01-01 | Stop reason: HOSPADM

## 2020-01-01 RX ORDER — LIDOCAINE HYDROCHLORIDE AND EPINEPHRINE 10; 10 MG/ML; UG/ML
INJECTION, SOLUTION INFILTRATION; PERINEURAL PRN
Status: DISCONTINUED | OUTPATIENT
Start: 2020-01-01 | End: 2020-01-01 | Stop reason: HOSPADM

## 2020-01-01 RX ORDER — INSULIN LISPRO 100 [IU]/ML
INJECTION, SOLUTION INTRAVENOUS; SUBCUTANEOUS
COMMUNITY

## 2020-01-01 RX ORDER — INSULIN GLARGINE 100 [IU]/ML
INJECTION, SOLUTION SUBCUTANEOUS
Qty: 12 ML | Refills: 11 | Status: SHIPPED | OUTPATIENT
Start: 2020-01-01

## 2020-01-01 RX ORDER — LISINOPRIL 2.5 MG/1
TABLET ORAL
Qty: 31 TABLET | Refills: 11 | Status: SHIPPED | OUTPATIENT
Start: 2020-01-01

## 2020-01-01 RX ORDER — TRAMADOL HYDROCHLORIDE 50 MG/1
50 TABLET ORAL EVERY 8 HOURS PRN
Status: DISCONTINUED | OUTPATIENT
Start: 2020-01-01 | End: 2020-01-01 | Stop reason: HOSPADM

## 2020-01-01 RX ORDER — ROCURONIUM BROMIDE 10 MG/ML
INJECTION, SOLUTION INTRAVENOUS PRN
Status: DISCONTINUED | OUTPATIENT
Start: 2020-01-01 | End: 2020-01-01 | Stop reason: SDUPTHER

## 2020-01-01 RX ORDER — 0.9 % SODIUM CHLORIDE 0.9 %
1000 INTRAVENOUS SOLUTION INTRAVENOUS ONCE
Status: DISCONTINUED | OUTPATIENT
Start: 2020-01-01 | End: 2020-01-01

## 2020-01-01 RX ORDER — PROMETHAZINE HYDROCHLORIDE 25 MG/1
12.5 TABLET ORAL EVERY 6 HOURS PRN
Status: DISCONTINUED | OUTPATIENT
Start: 2020-01-01 | End: 2020-01-01

## 2020-01-01 RX ORDER — MIDODRINE HYDROCHLORIDE 5 MG/1
5 TABLET ORAL
Status: DISCONTINUED | OUTPATIENT
Start: 2020-01-01 | End: 2020-01-01 | Stop reason: HOSPADM

## 2020-01-01 RX ORDER — LIDOCAINE HYDROCHLORIDE 20 MG/ML
INJECTION, SOLUTION INTRAVENOUS PRN
Status: DISCONTINUED | OUTPATIENT
Start: 2020-01-01 | End: 2020-01-01 | Stop reason: SDUPTHER

## 2020-01-01 RX ORDER — NEOSTIGMINE METHYLSULFATE 1 MG/ML
INJECTION, SOLUTION INTRAVENOUS PRN
Status: DISCONTINUED | OUTPATIENT
Start: 2020-01-01 | End: 2020-01-01 | Stop reason: SDUPTHER

## 2020-01-01 RX ORDER — ONDANSETRON 2 MG/ML
4 INJECTION INTRAMUSCULAR; INTRAVENOUS EVERY 6 HOURS PRN
Status: DISCONTINUED | OUTPATIENT
Start: 2020-01-01 | End: 2020-01-01

## 2020-01-01 RX ORDER — GLUCAGON HYDROCHLORIDE 1 MG
KIT INJECTION
Qty: 1 EACH | Refills: 10 | Status: SHIPPED | OUTPATIENT
Start: 2020-01-01

## 2020-01-01 RX ORDER — CIPROFLOXACIN 250 MG/1
250 TABLET, FILM COATED ORAL 2 TIMES DAILY
Qty: 14 TABLET | Refills: 0 | Status: SHIPPED | OUTPATIENT
Start: 2020-01-01 | End: 2020-01-01

## 2020-01-01 RX ORDER — CEPHALEXIN 500 MG/1
500 CAPSULE ORAL 2 TIMES DAILY
Qty: 14 CAPSULE | Refills: 0 | Status: SHIPPED | OUTPATIENT
Start: 2020-01-01 | End: 2020-01-01

## 2020-01-01 RX ORDER — ATORVASTATIN CALCIUM 20 MG/1
20 TABLET, FILM COATED ORAL NIGHTLY
Status: DISCONTINUED | OUTPATIENT
Start: 2020-01-01 | End: 2020-01-01 | Stop reason: HOSPADM

## 2020-01-01 RX ORDER — INSULIN GLARGINE 100 [IU]/ML
15 INJECTION, SOLUTION SUBCUTANEOUS
Status: DISCONTINUED | OUTPATIENT
Start: 2020-01-01 | End: 2020-01-01

## 2020-01-01 RX ORDER — ACETAMINOPHEN AND CODEINE PHOSPHATE 300; 30 MG/1; MG/1
1 TABLET ORAL EVERY 4 HOURS PRN
Qty: 42 TABLET | Refills: 0 | Status: SHIPPED | OUTPATIENT
Start: 2020-01-01 | End: 2020-01-01

## 2020-01-01 RX ORDER — INSULIN GLARGINE 100 [IU]/ML
30 INJECTION, SOLUTION SUBCUTANEOUS ONCE
Status: DISCONTINUED | OUTPATIENT
Start: 2020-01-01 | End: 2020-01-01

## 2020-01-01 RX ORDER — INSULIN LISPRO 100 [IU]/ML
INJECTION, SUSPENSION SUBCUTANEOUS
Qty: 30 PEN | Refills: 11 | Status: SHIPPED
Start: 2020-01-01

## 2020-01-01 RX ORDER — TORSEMIDE 5 MG/1
5 TABLET ORAL DAILY
Qty: 30 TABLET | Refills: 3 | Status: SHIPPED
Start: 2020-01-01

## 2020-01-01 RX ADMIN — HYDROCORTISONE SODIUM SUCCINATE 100 MG: 100 INJECTION, POWDER, FOR SOLUTION INTRAMUSCULAR; INTRAVENOUS at 11:21

## 2020-01-01 RX ADMIN — LEVOTHYROXINE SODIUM 88 MCG: 0.09 TABLET ORAL at 06:42

## 2020-01-01 RX ADMIN — HYDROCORTISONE SODIUM SUCCINATE 100 MG: 100 INJECTION, POWDER, FOR SOLUTION INTRAMUSCULAR; INTRAVENOUS at 18:59

## 2020-01-01 RX ADMIN — Medication 0.6 MG: at 13:39

## 2020-01-01 RX ADMIN — SODIUM CHLORIDE, PRESERVATIVE FREE 10 ML: 5 INJECTION INTRAVENOUS at 20:50

## 2020-01-01 RX ADMIN — MEMANTINE 10 MG: 10 TABLET, FILM COATED ORAL at 09:34

## 2020-01-01 RX ADMIN — Medication 10 ML: at 09:01

## 2020-01-01 RX ADMIN — LISINOPRIL 2.5 MG: 5 TABLET ORAL at 09:00

## 2020-01-01 RX ADMIN — SODIUM BICARBONATE: 84 INJECTION, SOLUTION INTRAVENOUS at 05:54

## 2020-01-01 RX ADMIN — TORSEMIDE 5 MG: 10 TABLET ORAL at 09:00

## 2020-01-01 RX ADMIN — MEMANTINE 10 MG: 10 TABLET, FILM COATED ORAL at 08:47

## 2020-01-01 RX ADMIN — Medication: at 20:56

## 2020-01-01 RX ADMIN — LEVOTHYROXINE SODIUM 88 MCG: 0.09 TABLET ORAL at 09:00

## 2020-01-01 RX ADMIN — HEPARIN SODIUM 5000 UNITS: 10000 INJECTION INTRAVENOUS; SUBCUTANEOUS at 20:56

## 2020-01-01 RX ADMIN — Medication: at 09:52

## 2020-01-01 RX ADMIN — HEPARIN SODIUM 5000 UNITS: 10000 INJECTION INTRAVENOUS; SUBCUTANEOUS at 16:54

## 2020-01-01 RX ADMIN — Medication: at 20:50

## 2020-01-01 RX ADMIN — MIDODRINE HYDROCHLORIDE 5 MG: 5 TABLET ORAL at 18:00

## 2020-01-01 RX ADMIN — INSULIN GLARGINE 30 UNITS: 100 INJECTION, SOLUTION SUBCUTANEOUS at 20:25

## 2020-01-01 RX ADMIN — MUPIROCIN: 20 OINTMENT TOPICAL at 20:56

## 2020-01-01 RX ADMIN — POTASSIUM & SODIUM PHOSPHATES POWDER PACK 280-160-250 MG 250 MG: 280-160-250 PACK at 08:28

## 2020-01-01 RX ADMIN — SODIUM CHLORIDE: 9 INJECTION, SOLUTION INTRAVENOUS at 12:56

## 2020-01-01 RX ADMIN — Medication 3 MG: at 13:39

## 2020-01-01 RX ADMIN — HYDROCODONE BITARTRATE AND ACETAMINOPHEN 1 TABLET: 5; 325 TABLET ORAL at 17:03

## 2020-01-01 RX ADMIN — HYDROCORTISONE SODIUM SUCCINATE 100 MG: 100 INJECTION, POWDER, FOR SOLUTION INTRAMUSCULAR; INTRAVENOUS at 10:43

## 2020-01-01 RX ADMIN — HEPARIN SODIUM 5000 UNITS: 10000 INJECTION INTRAVENOUS; SUBCUTANEOUS at 09:53

## 2020-01-01 RX ADMIN — HEPARIN SODIUM 5000 UNITS: 10000 INJECTION INTRAVENOUS; SUBCUTANEOUS at 06:40

## 2020-01-01 RX ADMIN — ACETAMINOPHEN 650 MG: 325 TABLET ORAL at 10:45

## 2020-01-01 RX ADMIN — SODIUM CHLORIDE, PRESERVATIVE FREE 10 ML: 5 INJECTION INTRAVENOUS at 02:50

## 2020-01-01 RX ADMIN — SODIUM BICARBONATE: 84 INJECTION, SOLUTION INTRAVENOUS at 16:00

## 2020-01-01 RX ADMIN — INSULIN LISPRO 2 UNITS: 100 INJECTION, SOLUTION INTRAVENOUS; SUBCUTANEOUS at 17:05

## 2020-01-01 RX ADMIN — Medication 10 ML: at 09:00

## 2020-01-01 RX ADMIN — MIDODRINE HYDROCHLORIDE 5 MG: 5 TABLET ORAL at 08:26

## 2020-01-01 RX ADMIN — SODIUM CHLORIDE, PRESERVATIVE FREE 10 ML: 5 INJECTION INTRAVENOUS at 20:48

## 2020-01-01 RX ADMIN — CEFTRIAXONE SODIUM 1 G: 1 INJECTION, POWDER, FOR SOLUTION INTRAMUSCULAR; INTRAVENOUS at 12:56

## 2020-01-01 RX ADMIN — TRIMETHOBENZAMIDE HYDROCHLORIDE 200 MG: 100 INJECTION INTRAMUSCULAR at 08:21

## 2020-01-01 RX ADMIN — LEVOTHYROXINE SODIUM 88 MCG: 0.09 TABLET ORAL at 06:41

## 2020-01-01 RX ADMIN — CEFEPIME 2 G: 2 INJECTION, POWDER, FOR SOLUTION INTRAVENOUS at 19:31

## 2020-01-01 RX ADMIN — INSULIN LISPRO 1 UNITS: 100 INJECTION, SOLUTION INTRAVENOUS; SUBCUTANEOUS at 20:25

## 2020-01-01 RX ADMIN — DEXTROSE MONOHYDRATE 12.5 G: 25 INJECTION, SOLUTION INTRAVENOUS at 06:19

## 2020-01-01 RX ADMIN — INSULIN LISPRO 6 UNITS: 100 INJECTION, SOLUTION INTRAVENOUS; SUBCUTANEOUS at 00:09

## 2020-01-01 RX ADMIN — MEMANTINE HYDROCHLORIDE 10 MG: 10 TABLET, FILM COATED ORAL at 20:47

## 2020-01-01 RX ADMIN — ENOXAPARIN SODIUM 30 MG: 30 INJECTION SUBCUTANEOUS at 10:46

## 2020-01-01 RX ADMIN — WATER 2 ML: 1 INJECTION INTRAMUSCULAR; INTRAVENOUS; SUBCUTANEOUS at 09:51

## 2020-01-01 RX ADMIN — MIDODRINE HYDROCHLORIDE 5 MG: 5 TABLET ORAL at 12:27

## 2020-01-01 RX ADMIN — HEPARIN SODIUM 5000 UNITS: 10000 INJECTION INTRAVENOUS; SUBCUTANEOUS at 22:58

## 2020-01-01 RX ADMIN — INSULIN LISPRO 4 UNITS: 100 INJECTION, SOLUTION INTRAVENOUS; SUBCUTANEOUS at 12:26

## 2020-01-01 RX ADMIN — HYDROCORTISONE SODIUM SUCCINATE 50 MG: 100 INJECTION, POWDER, FOR SOLUTION INTRAMUSCULAR; INTRAVENOUS at 06:40

## 2020-01-01 RX ADMIN — Medication 10 ML: at 20:25

## 2020-01-01 RX ADMIN — MUPIROCIN: 20 OINTMENT TOPICAL at 09:52

## 2020-01-01 RX ADMIN — TRAMADOL HYDROCHLORIDE 50 MG: 50 TABLET, FILM COATED ORAL at 22:31

## 2020-01-01 RX ADMIN — INSULIN LISPRO 6 UNITS: 100 INJECTION, SOLUTION INTRAVENOUS; SUBCUTANEOUS at 18:00

## 2020-01-01 RX ADMIN — WATER 10 ML: 1 INJECTION INTRAMUSCULAR; INTRAVENOUS; SUBCUTANEOUS at 05:54

## 2020-01-01 RX ADMIN — Medication 10 ML: at 21:00

## 2020-01-01 RX ADMIN — ENOXAPARIN SODIUM 30 MG: 30 INJECTION SUBCUTANEOUS at 09:34

## 2020-01-01 RX ADMIN — METOPROLOL SUCCINATE 50 MG: 50 TABLET, EXTENDED RELEASE ORAL at 09:34

## 2020-01-01 RX ADMIN — MEMANTINE HYDROCHLORIDE 10 MG: 10 TABLET, FILM COATED ORAL at 22:30

## 2020-01-01 RX ADMIN — INSULIN GLARGINE 30 UNITS: 100 INJECTION, SOLUTION SUBCUTANEOUS at 21:10

## 2020-01-01 RX ADMIN — INSULIN LISPRO 1 UNITS: 100 INJECTION, SOLUTION INTRAVENOUS; SUBCUTANEOUS at 08:47

## 2020-01-01 RX ADMIN — LEVOTHYROXINE SODIUM 88 MCG: 0.09 TABLET ORAL at 09:34

## 2020-01-01 RX ADMIN — CEFEPIME 1 G: 1 INJECTION, POWDER, FOR SOLUTION INTRAMUSCULAR; INTRAVENOUS at 20:49

## 2020-01-01 RX ADMIN — MUPIROCIN: 20 OINTMENT TOPICAL at 08:22

## 2020-01-01 RX ADMIN — Medication: at 08:30

## 2020-01-01 RX ADMIN — LEVOTHYROXINE SODIUM 88 MCG: 0.09 TABLET ORAL at 06:22

## 2020-01-01 RX ADMIN — MUPIROCIN: 20 OINTMENT TOPICAL at 08:27

## 2020-01-01 RX ADMIN — SODIUM CHLORIDE 1000 ML: 9 INJECTION, SOLUTION INTRAVENOUS at 21:28

## 2020-01-01 RX ADMIN — METOPROLOL SUCCINATE 50 MG: 50 TABLET, EXTENDED RELEASE ORAL at 10:46

## 2020-01-01 RX ADMIN — Medication: at 20:20

## 2020-01-01 RX ADMIN — LEVOTHYROXINE SODIUM 88 MCG: 0.09 TABLET ORAL at 08:47

## 2020-01-01 RX ADMIN — INSULIN LISPRO 0 UNITS: 100 INJECTION, SOLUTION INTRAVENOUS; SUBCUTANEOUS at 21:10

## 2020-01-01 RX ADMIN — INSULIN LISPRO 3 UNITS: 100 INJECTION, SOLUTION INTRAVENOUS; SUBCUTANEOUS at 14:30

## 2020-01-01 RX ADMIN — MEMANTINE 10 MG: 10 TABLET, FILM COATED ORAL at 10:45

## 2020-01-01 RX ADMIN — MUPIROCIN: 20 OINTMENT TOPICAL at 20:44

## 2020-01-01 RX ADMIN — TRAMADOL HYDROCHLORIDE 50 MG: 50 TABLET, FILM COATED ORAL at 08:21

## 2020-01-01 RX ADMIN — Medication 100 MCG: at 13:09

## 2020-01-01 RX ADMIN — MEMANTINE 10 MG: 10 TABLET, FILM COATED ORAL at 09:00

## 2020-01-01 RX ADMIN — Medication: at 20:48

## 2020-01-01 RX ADMIN — MEMANTINE HYDROCHLORIDE 10 MG: 10 TABLET, FILM COATED ORAL at 08:28

## 2020-01-01 RX ADMIN — Medication 10 ML: at 09:34

## 2020-01-01 RX ADMIN — CEFEPIME 1 G: 1 INJECTION, POWDER, FOR SOLUTION INTRAMUSCULAR; INTRAVENOUS at 19:10

## 2020-01-01 RX ADMIN — ONDANSETRON HYDROCHLORIDE 4 MG: 2 INJECTION, SOLUTION INTRAMUSCULAR; INTRAVENOUS at 13:18

## 2020-01-01 RX ADMIN — CEFEPIME 1 G: 1 INJECTION, POWDER, FOR SOLUTION INTRAMUSCULAR; INTRAVENOUS at 19:00

## 2020-01-01 RX ADMIN — HEPARIN SODIUM 5000 UNITS: 10000 INJECTION INTRAVENOUS; SUBCUTANEOUS at 09:51

## 2020-01-01 RX ADMIN — Medication: at 08:29

## 2020-01-01 RX ADMIN — POTASSIUM CHLORIDE 10 MEQ: 10 INJECTION, SOLUTION INTRAVENOUS at 13:20

## 2020-01-01 RX ADMIN — FENTANYL CITRATE 50 MCG: 0.05 INJECTION, SOLUTION INTRAMUSCULAR; INTRAVENOUS at 14:29

## 2020-01-01 RX ADMIN — MIDODRINE HYDROCHLORIDE 5 MG: 5 TABLET ORAL at 16:52

## 2020-01-01 RX ADMIN — SODIUM CHLORIDE 1000 ML: 9 INJECTION, SOLUTION INTRAVENOUS at 20:59

## 2020-01-01 RX ADMIN — SODIUM BICARBONATE: 84 INJECTION, SOLUTION INTRAVENOUS at 08:35

## 2020-01-01 RX ADMIN — VANCOMYCIN HYDROCHLORIDE 1 G: 1 INJECTION, POWDER, LYOPHILIZED, FOR SOLUTION INTRAVENOUS at 13:06

## 2020-01-01 RX ADMIN — MIDODRINE HYDROCHLORIDE 5 MG: 5 TABLET ORAL at 12:56

## 2020-01-01 RX ADMIN — INSULIN LISPRO 4 UNITS: 100 INJECTION, SOLUTION INTRAVENOUS; SUBCUTANEOUS at 12:34

## 2020-01-01 RX ADMIN — Medication 10 ML: at 21:08

## 2020-01-01 RX ADMIN — INSULIN GLARGINE 15 UNITS: 100 INJECTION, SOLUTION SUBCUTANEOUS at 06:42

## 2020-01-01 RX ADMIN — INSULIN GLARGINE 15 UNITS: 100 INJECTION, SOLUTION SUBCUTANEOUS at 08:34

## 2020-01-01 RX ADMIN — Medication 100 MCG: at 13:24

## 2020-01-01 RX ADMIN — HYDROCODONE BITARTRATE AND ACETAMINOPHEN 1 TABLET: 5; 325 TABLET ORAL at 14:10

## 2020-01-01 RX ADMIN — POLYETHYLENE GLYCOL 3350 17 G: 17 POWDER, FOR SOLUTION ORAL at 10:43

## 2020-01-01 RX ADMIN — HEPARIN SODIUM 5000 UNITS: 10000 INJECTION INTRAVENOUS; SUBCUTANEOUS at 05:55

## 2020-01-01 RX ADMIN — MIDODRINE HYDROCHLORIDE 5 MG: 5 TABLET ORAL at 09:51

## 2020-01-01 RX ADMIN — HYDROCORTISONE SODIUM SUCCINATE 100 MG: 100 INJECTION, POWDER, FOR SOLUTION INTRAMUSCULAR; INTRAVENOUS at 18:27

## 2020-01-01 RX ADMIN — LISINOPRIL 2.5 MG: 5 TABLET ORAL at 10:44

## 2020-01-01 RX ADMIN — METOPROLOL SUCCINATE 50 MG: 50 TABLET, EXTENDED RELEASE ORAL at 08:59

## 2020-01-01 RX ADMIN — INSULIN LISPRO 2 UNITS: 100 INJECTION, SOLUTION INTRAVENOUS; SUBCUTANEOUS at 08:44

## 2020-01-01 RX ADMIN — TORSEMIDE 5 MG: 10 TABLET ORAL at 09:34

## 2020-01-01 RX ADMIN — MEMANTINE 10 MG: 10 TABLET, FILM COATED ORAL at 20:53

## 2020-01-01 RX ADMIN — TORSEMIDE 5 MG: 10 TABLET ORAL at 10:45

## 2020-01-01 RX ADMIN — TRIMETHOBENZAMIDE HYDROCHLORIDE 200 MG: 100 INJECTION INTRAMUSCULAR at 00:43

## 2020-01-01 RX ADMIN — MIDODRINE HYDROCHLORIDE 5 MG: 5 TABLET ORAL at 17:05

## 2020-01-01 RX ADMIN — CEFTRIAXONE SODIUM 1 G: 1 INJECTION, POWDER, FOR SOLUTION INTRAMUSCULAR; INTRAVENOUS at 12:09

## 2020-01-01 RX ADMIN — HEPARIN SODIUM 5000 UNITS: 10000 INJECTION INTRAVENOUS; SUBCUTANEOUS at 17:09

## 2020-01-01 RX ADMIN — WATER 2 ML: 1 INJECTION INTRAMUSCULAR; INTRAVENOUS; SUBCUTANEOUS at 18:27

## 2020-01-01 RX ADMIN — SODIUM CHLORIDE, PRESERVATIVE FREE 10 ML: 5 INJECTION INTRAVENOUS at 22:33

## 2020-01-01 RX ADMIN — MIDODRINE HYDROCHLORIDE 5 MG: 5 TABLET ORAL at 17:22

## 2020-01-01 RX ADMIN — MIDODRINE HYDROCHLORIDE 5 MG: 5 TABLET ORAL at 12:18

## 2020-01-01 RX ADMIN — HEPARIN SODIUM 5000 UNITS: 10000 INJECTION INTRAVENOUS; SUBCUTANEOUS at 05:51

## 2020-01-01 RX ADMIN — MUPIROCIN: 20 OINTMENT TOPICAL at 20:20

## 2020-01-01 RX ADMIN — HYDROCORTISONE SODIUM SUCCINATE 100 MG: 100 INJECTION, POWDER, FOR SOLUTION INTRAMUSCULAR; INTRAVENOUS at 05:54

## 2020-01-01 RX ADMIN — SODIUM CHLORIDE, PRESERVATIVE FREE 10 ML: 5 INJECTION INTRAVENOUS at 08:09

## 2020-01-01 RX ADMIN — INSULIN LISPRO 6 UNITS: 100 INJECTION, SOLUTION INTRAVENOUS; SUBCUTANEOUS at 15:05

## 2020-01-01 RX ADMIN — INSULIN LISPRO 6 UNITS: 100 INJECTION, SOLUTION INTRAVENOUS; SUBCUTANEOUS at 13:18

## 2020-01-01 RX ADMIN — ATORVASTATIN CALCIUM 20 MG: 20 TABLET, FILM COATED ORAL at 21:12

## 2020-01-01 RX ADMIN — MEMANTINE HYDROCHLORIDE 10 MG: 10 TABLET, FILM COATED ORAL at 08:06

## 2020-01-01 RX ADMIN — Medication 10 ML: at 20:59

## 2020-01-01 RX ADMIN — ATORVASTATIN CALCIUM 20 MG: 20 TABLET, FILM COATED ORAL at 20:25

## 2020-01-01 RX ADMIN — Medication: at 20:44

## 2020-01-01 RX ADMIN — SODIUM CHLORIDE AND POTASSIUM CHLORIDE: 4.5; 1.49 INJECTION, SOLUTION INTRAVENOUS at 09:52

## 2020-01-01 RX ADMIN — Medication 10 ML: at 08:47

## 2020-01-01 RX ADMIN — HYDROCORTISONE SODIUM SUCCINATE 100 MG: 100 INJECTION, POWDER, FOR SOLUTION INTRAMUSCULAR; INTRAVENOUS at 04:16

## 2020-01-01 RX ADMIN — Medication: at 15:15

## 2020-01-01 RX ADMIN — METOPROLOL SUCCINATE 50 MG: 50 TABLET, EXTENDED RELEASE ORAL at 08:47

## 2020-01-01 RX ADMIN — MUPIROCIN: 20 OINTMENT TOPICAL at 08:35

## 2020-01-01 RX ADMIN — TORSEMIDE 5 MG: 10 TABLET ORAL at 08:43

## 2020-01-01 RX ADMIN — INSULIN GLARGINE 15 UNITS: 100 INJECTION, SOLUTION SUBCUTANEOUS at 15:10

## 2020-01-01 RX ADMIN — TORSEMIDE 5 MG: 10 TABLET ORAL at 08:47

## 2020-01-01 RX ADMIN — SODIUM CHLORIDE, PRESERVATIVE FREE 10 ML: 5 INJECTION INTRAVENOUS at 00:19

## 2020-01-01 RX ADMIN — INSULIN GLARGINE 30 UNITS: 100 INJECTION, SOLUTION SUBCUTANEOUS at 22:58

## 2020-01-01 RX ADMIN — MEMANTINE 10 MG: 10 TABLET, FILM COATED ORAL at 08:44

## 2020-01-01 RX ADMIN — Medication 10 ML: at 21:27

## 2020-01-01 RX ADMIN — INSULIN LISPRO 4 UNITS: 100 INJECTION, SOLUTION INTRAVENOUS; SUBCUTANEOUS at 04:56

## 2020-01-01 RX ADMIN — INSULIN GLARGINE 20 UNITS: 100 INJECTION, SOLUTION SUBCUTANEOUS at 21:06

## 2020-01-01 RX ADMIN — POTASSIUM CHLORIDE 10 MEQ: 10 INJECTION, SOLUTION INTRAVENOUS at 12:10

## 2020-01-01 RX ADMIN — MEMANTINE 10 MG: 10 TABLET, FILM COATED ORAL at 20:25

## 2020-01-01 RX ADMIN — HEPARIN SODIUM 5000 UNITS: 10000 INJECTION INTRAVENOUS; SUBCUTANEOUS at 00:24

## 2020-01-01 RX ADMIN — HEPARIN SODIUM 5000 UNITS: 10000 INJECTION INTRAVENOUS; SUBCUTANEOUS at 22:36

## 2020-01-01 RX ADMIN — ROCURONIUM BROMIDE 20 MG: 10 INJECTION, SOLUTION INTRAVENOUS at 13:03

## 2020-01-01 RX ADMIN — HEPARIN SODIUM 5000 UNITS: 10000 INJECTION INTRAVENOUS; SUBCUTANEOUS at 06:49

## 2020-01-01 RX ADMIN — HYDROCODONE BITARTRATE AND ACETAMINOPHEN 1 TABLET: 5; 325 TABLET ORAL at 13:49

## 2020-01-01 RX ADMIN — SODIUM CHLORIDE, PRESERVATIVE FREE 10 ML: 5 INJECTION INTRAVENOUS at 08:35

## 2020-01-01 RX ADMIN — ATORVASTATIN CALCIUM 20 MG: 20 TABLET, FILM COATED ORAL at 20:29

## 2020-01-01 RX ADMIN — Medication 100 MCG: at 13:17

## 2020-01-01 RX ADMIN — HYDROCORTISONE SODIUM SUCCINATE 100 MG: 100 INJECTION, POWDER, FOR SOLUTION INTRAMUSCULAR; INTRAVENOUS at 06:47

## 2020-01-01 RX ADMIN — BARIUM SULFATE 15 ML: 400 PASTE ORAL at 15:01

## 2020-01-01 RX ADMIN — SODIUM CHLORIDE AND POTASSIUM CHLORIDE: 4.5; 1.49 INJECTION, SOLUTION INTRAVENOUS at 13:39

## 2020-01-01 RX ADMIN — LEVOTHYROXINE SODIUM 88 MCG: 0.09 TABLET ORAL at 06:28

## 2020-01-01 RX ADMIN — MEMANTINE 10 MG: 10 TABLET, FILM COATED ORAL at 20:29

## 2020-01-01 RX ADMIN — Medication 100 MCG: at 13:06

## 2020-01-01 RX ADMIN — MIDODRINE HYDROCHLORIDE 5 MG: 5 TABLET ORAL at 08:28

## 2020-01-01 RX ADMIN — INSULIN LISPRO 6 UNITS: 100 INJECTION, SOLUTION INTRAVENOUS; SUBCUTANEOUS at 08:33

## 2020-01-01 RX ADMIN — Medication 10 ML: at 08:44

## 2020-01-01 RX ADMIN — CEPHALEXIN 500 MG: 500 CAPSULE ORAL at 13:57

## 2020-01-01 RX ADMIN — LISINOPRIL 2.5 MG: 5 TABLET ORAL at 09:34

## 2020-01-01 RX ADMIN — POTASSIUM & SODIUM PHOSPHATES POWDER PACK 280-160-250 MG 250 MG: 280-160-250 PACK at 09:51

## 2020-01-01 RX ADMIN — Medication 5 MCG/MIN: at 21:46

## 2020-01-01 RX ADMIN — MUPIROCIN: 20 OINTMENT TOPICAL at 22:28

## 2020-01-01 RX ADMIN — BARIUM SULFATE 15 ML: 400 SUSPENSION ORAL at 15:00

## 2020-01-01 RX ADMIN — POTASSIUM & SODIUM PHOSPHATES POWDER PACK 280-160-250 MG 250 MG: 280-160-250 PACK at 17:05

## 2020-01-01 RX ADMIN — INSULIN LISPRO 2 UNITS: 100 INJECTION, SOLUTION INTRAVENOUS; SUBCUTANEOUS at 20:54

## 2020-01-01 RX ADMIN — MIDODRINE HYDROCHLORIDE 5 MG: 5 TABLET ORAL at 11:37

## 2020-01-01 RX ADMIN — HEPARIN SODIUM 5000 UNITS: 10000 INJECTION INTRAVENOUS; SUBCUTANEOUS at 15:09

## 2020-01-01 RX ADMIN — MEMANTINE HYDROCHLORIDE 10 MG: 10 TABLET, FILM COATED ORAL at 08:22

## 2020-01-01 RX ADMIN — INSULIN LISPRO 1 UNITS: 100 INJECTION, SOLUTION INTRAVENOUS; SUBCUTANEOUS at 21:07

## 2020-01-01 RX ADMIN — Medication: at 08:22

## 2020-01-01 RX ADMIN — LEVOTHYROXINE SODIUM 88 MCG: 0.09 TABLET ORAL at 06:19

## 2020-01-01 RX ADMIN — BARIUM SULFATE 15 ML: 0.81 POWDER, FOR SUSPENSION ORAL at 15:00

## 2020-01-01 RX ADMIN — MUPIROCIN: 20 OINTMENT TOPICAL at 17:21

## 2020-01-01 RX ADMIN — MEMANTINE HYDROCHLORIDE 10 MG: 10 TABLET, FILM COATED ORAL at 20:56

## 2020-01-01 RX ADMIN — HYDROCORTISONE SODIUM SUCCINATE 100 MG: 100 INJECTION, POWDER, FOR SOLUTION INTRAMUSCULAR; INTRAVENOUS at 02:50

## 2020-01-01 RX ADMIN — POTASSIUM CHLORIDE 10 MEQ: 10 INJECTION, SOLUTION INTRAVENOUS at 14:23

## 2020-01-01 RX ADMIN — POTASSIUM CHLORIDE 40 MEQ: 400 INJECTION, SOLUTION INTRAVENOUS at 08:05

## 2020-01-01 RX ADMIN — MELATONIN 3 MG ORAL TABLET 3 MG: 3 TABLET ORAL at 20:52

## 2020-01-01 RX ADMIN — ATORVASTATIN CALCIUM 20 MG: 20 TABLET, FILM COATED ORAL at 20:52

## 2020-01-01 RX ADMIN — MIDODRINE HYDROCHLORIDE 5 MG: 5 TABLET ORAL at 08:07

## 2020-01-01 RX ADMIN — LIDOCAINE HYDROCHLORIDE 60 MG: 20 INJECTION, SOLUTION INTRAVENOUS at 13:03

## 2020-01-01 RX ADMIN — FENTANYL CITRATE 50 MCG: 50 INJECTION, SOLUTION INTRAMUSCULAR; INTRAVENOUS at 13:45

## 2020-01-01 RX ADMIN — INSULIN LISPRO 4 UNITS: 100 INJECTION, SOLUTION INTRAVENOUS; SUBCUTANEOUS at 08:10

## 2020-01-01 RX ADMIN — INSULIN LISPRO 6 UNITS: 100 INJECTION, SOLUTION INTRAVENOUS; SUBCUTANEOUS at 11:37

## 2020-01-01 RX ADMIN — INSULIN LISPRO 2 UNITS: 100 INJECTION, SOLUTION INTRAVENOUS; SUBCUTANEOUS at 09:05

## 2020-01-01 RX ADMIN — LEVOTHYROXINE SODIUM 88 MCG: 0.09 TABLET ORAL at 08:29

## 2020-01-01 RX ADMIN — Medication: at 08:37

## 2020-01-01 RX ADMIN — SODIUM CHLORIDE 1905 ML: 9 INJECTION, SOLUTION INTRAVENOUS at 15:48

## 2020-01-01 RX ADMIN — MEMANTINE 10 MG: 10 TABLET, FILM COATED ORAL at 21:08

## 2020-01-01 RX ADMIN — PROPOFOL 90 MG: 10 INJECTION, EMULSION INTRAVENOUS at 13:03

## 2020-01-01 RX ADMIN — LEVOTHYROXINE SODIUM 88 MCG: 0.09 TABLET ORAL at 08:26

## 2020-01-01 RX ADMIN — LISINOPRIL 2.5 MG: 5 TABLET ORAL at 08:47

## 2020-01-01 RX ADMIN — INSULIN LISPRO 8 UNITS: 100 INJECTION, SOLUTION INTRAVENOUS; SUBCUTANEOUS at 17:11

## 2020-01-01 RX ADMIN — HEPARIN SODIUM 5000 UNITS: 10000 INJECTION INTRAVENOUS; SUBCUTANEOUS at 00:11

## 2020-01-01 RX ADMIN — TRAMADOL HYDROCHLORIDE 50 MG: 50 TABLET, FILM COATED ORAL at 20:50

## 2020-01-01 RX ADMIN — INSULIN GLARGINE 35 UNITS: 100 INJECTION, SOLUTION SUBCUTANEOUS at 20:55

## 2020-01-01 RX ADMIN — DEXAMETHASONE SODIUM PHOSPHATE 10 MG: 10 INJECTION INTRAMUSCULAR; INTRAVENOUS at 13:07

## 2020-01-01 RX ADMIN — HEPARIN SODIUM 5000 UNITS: 10000 INJECTION INTRAVENOUS; SUBCUTANEOUS at 13:39

## 2020-01-01 RX ADMIN — INSULIN GLARGINE 30 UNITS: 100 INJECTION, SOLUTION SUBCUTANEOUS at 20:54

## 2020-01-01 RX ADMIN — SODIUM BICARBONATE: 84 INJECTION, SOLUTION INTRAVENOUS at 19:00

## 2020-01-01 RX ADMIN — Medication: at 22:29

## 2020-01-01 RX ADMIN — METOPROLOL SUCCINATE 50 MG: 50 TABLET, EXTENDED RELEASE ORAL at 09:00

## 2020-01-01 RX ADMIN — LEVOTHYROXINE SODIUM 88 MCG: 0.09 TABLET ORAL at 08:23

## 2020-01-01 RX ADMIN — SODIUM CHLORIDE AND POTASSIUM CHLORIDE: 4.5; 1.49 INJECTION, SOLUTION INTRAVENOUS at 06:43

## 2020-01-01 RX ADMIN — HYDROCORTISONE SODIUM SUCCINATE 50 MG: 100 INJECTION, POWDER, FOR SOLUTION INTRAMUSCULAR; INTRAVENOUS at 09:52

## 2020-01-01 RX ADMIN — INSULIN LISPRO 2 UNITS: 100 INJECTION, SOLUTION INTRAVENOUS; SUBCUTANEOUS at 18:27

## 2020-01-01 RX ADMIN — SODIUM CHLORIDE AND POTASSIUM CHLORIDE: 4.5; 1.49 INJECTION, SOLUTION INTRAVENOUS at 19:10

## 2020-01-01 RX ADMIN — MEMANTINE HYDROCHLORIDE 10 MG: 10 TABLET, FILM COATED ORAL at 09:51

## 2020-01-01 RX ADMIN — HEPARIN SODIUM 5000 UNITS: 10000 INJECTION INTRAVENOUS; SUBCUTANEOUS at 14:00

## 2020-01-01 RX ADMIN — POTASSIUM & SODIUM PHOSPHATES POWDER PACK 280-160-250 MG 250 MG: 280-160-250 PACK at 20:56

## 2020-01-01 RX ADMIN — CEFTRIAXONE SODIUM 1 G: 1 INJECTION, POWDER, FOR SOLUTION INTRAMUSCULAR; INTRAVENOUS at 12:23

## 2020-01-01 RX ADMIN — MEMANTINE 10 MG: 10 TABLET, FILM COATED ORAL at 21:12

## 2020-01-01 RX ADMIN — FENTANYL CITRATE 50 MCG: 50 INJECTION, SOLUTION INTRAMUSCULAR; INTRAVENOUS at 13:03

## 2020-01-01 RX ADMIN — MEMANTINE HYDROCHLORIDE 10 MG: 10 TABLET, FILM COATED ORAL at 08:26

## 2020-01-01 RX ADMIN — POTASSIUM & SODIUM PHOSPHATES POWDER PACK 280-160-250 MG 250 MG: 280-160-250 PACK at 16:52

## 2020-01-01 RX ADMIN — MIDODRINE HYDROCHLORIDE 5 MG: 5 TABLET ORAL at 08:22

## 2020-01-01 RX ADMIN — ATORVASTATIN CALCIUM 20 MG: 20 TABLET, FILM COATED ORAL at 21:08

## 2020-01-01 ASSESSMENT — PULMONARY FUNCTION TESTS
PIF_VALUE: 17
PIF_VALUE: 16
PIF_VALUE: 0
PIF_VALUE: 13
PIF_VALUE: 1
PIF_VALUE: 0
PIF_VALUE: 17
PIF_VALUE: 17
PIF_VALUE: 18
PIF_VALUE: 18
PIF_VALUE: 5
PIF_VALUE: 24
PIF_VALUE: 1
PIF_VALUE: 16
PIF_VALUE: 17
PIF_VALUE: 3
PIF_VALUE: 18
PIF_VALUE: 17
PIF_VALUE: 2
PIF_VALUE: 17
PIF_VALUE: 15
PIF_VALUE: 1
PIF_VALUE: 0
PIF_VALUE: 18
PIF_VALUE: 16
PIF_VALUE: 3
PIF_VALUE: 0
PIF_VALUE: 25
PIF_VALUE: 1
PIF_VALUE: 17
PIF_VALUE: 15
PIF_VALUE: 18
PIF_VALUE: 11
PIF_VALUE: 17
PIF_VALUE: 20
PIF_VALUE: 1
PIF_VALUE: 17
PIF_VALUE: 17
PIF_VALUE: 16
PIF_VALUE: 17
PIF_VALUE: 17
PIF_VALUE: 26
PIF_VALUE: 17
PIF_VALUE: 23
PIF_VALUE: 17

## 2020-01-01 ASSESSMENT — PAIN DESCRIPTION - LOCATION
LOCATION: BUTTOCKS
LOCATION: BACK;HIP
LOCATION: HIP
LOCATION: CHEST
LOCATION: BACK
LOCATION: BACK

## 2020-01-01 ASSESSMENT — PAIN SCALES - PAIN ASSESSMENT IN ADVANCED DEMENTIA (PAINAD)
BODYLANGUAGE: 0
BODYLANGUAGE: 0
BREATHING: 0
NEGVOCALIZATION: 0
TOTALSCORE: 0
FACIALEXPRESSION: 0
FACIALEXPRESSION: 0
CONSOLABILITY: 0
BODYLANGUAGE: 0
BREATHING: 0
FACIALEXPRESSION: 2
TOTALSCORE: 7
CONSOLABILITY: 1
BODYLANGUAGE: 0
BREATHING: 0
CONSOLABILITY: 0
CONSOLABILITY: 1
BODYLANGUAGE: 0
FACIALEXPRESSION: 0
BREATHING: 0
TOTALSCORE: 0
BODYLANGUAGE: 1
CONSOLABILITY: 1
NEGVOCALIZATION: 0
NEGVOCALIZATION: 0
BREATHING: 0
TOTALSCORE: 2
TOTALSCORE: 0
NEGVOCALIZATION: 0
TOTALSCORE: 0
FACIALEXPRESSION: 0
NEGVOCALIZATION: 1
CONSOLABILITY: 1
CONSOLABILITY: 0
TOTALSCORE: 0
BODYLANGUAGE: 2
CONSOLABILITY: 1
NEGVOCALIZATION: 1
NEGVOCALIZATION: 0
FACIALEXPRESSION: 2
FACIALEXPRESSION: 0
NEGVOCALIZATION: 0
BODYLANGUAGE: 0
NEGVOCALIZATION: 0
NEGVOCALIZATION: 0
BODYLANGUAGE: 0
TOTALSCORE: 0
TOTALSCORE: 5
NEGVOCALIZATION: 0
NEGVOCALIZATION: 1
TOTALSCORE: 2
BODYLANGUAGE: 0
NEGVOCALIZATION: 0
FACIALEXPRESSION: 1
NEGVOCALIZATION: 0
BODYLANGUAGE: 2
TOTALSCORE: 2
CONSOLABILITY: 1
TOTALSCORE: 7
FACIALEXPRESSION: 0
FACIALEXPRESSION: 0
CONSOLABILITY: 0
BREATHING: 0
FACIALEXPRESSION: 0
NEGVOCALIZATION: 1
FACIALEXPRESSION: 0
CONSOLABILITY: 1
BODYLANGUAGE: 0
TOTALSCORE: 0
FACIALEXPRESSION: 0
CONSOLABILITY: 1
TOTALSCORE: 2
BODYLANGUAGE: 0
BREATHING: 0
CONSOLABILITY: 0
NEGVOCALIZATION: 0
TOTALSCORE: 0
BREATHING: 0
BREATHING: 0
NEGVOCALIZATION: 1
BODYLANGUAGE: 0
FACIALEXPRESSION: 0
NEGVOCALIZATION: 0
TOTALSCORE: 0
FACIALEXPRESSION: 0
TOTALSCORE: 0
TOTALSCORE: 2
NEGVOCALIZATION: 1
CONSOLABILITY: 0
TOTALSCORE: 2
CONSOLABILITY: 1
BODYLANGUAGE: 0
BREATHING: 0
CONSOLABILITY: 0
CONSOLABILITY: 0
TOTALSCORE: 0
FACIALEXPRESSION: 0
FACIALEXPRESSION: 2
NEGVOCALIZATION: 1
BREATHING: 0
FACIALEXPRESSION: 0
NEGVOCALIZATION: 0
TOTALSCORE: 0
NEGVOCALIZATION: 1
CONSOLABILITY: 0
NEGVOCALIZATION: 1
FACIALEXPRESSION: 0
CONSOLABILITY: 1
CONSOLABILITY: 0
BREATHING: 0
TOTALSCORE: 0
BREATHING: 0
CONSOLABILITY: 0
NEGVOCALIZATION: 0
BREATHING: 1
FACIALEXPRESSION: 0
BODYLANGUAGE: 0
TOTALSCORE: 0
FACIALEXPRESSION: 0
CONSOLABILITY: 0
TOTALSCORE: 2
FACIALEXPRESSION: 0
TOTALSCORE: 0
BREATHING: 0
BODYLANGUAGE: 0
TOTALSCORE: 2
FACIALEXPRESSION: 0
NEGVOCALIZATION: 0
FACIALEXPRESSION: 0
BODYLANGUAGE: 0
TOTALSCORE: 0
CONSOLABILITY: 0
FACIALEXPRESSION: 0
NEGVOCALIZATION: 1
BREATHING: 0
FACIALEXPRESSION: 0
FACIALEXPRESSION: 0
BREATHING: 0
CONSOLABILITY: 1
BREATHING: 0
BREATHING: 0
CONSOLABILITY: 1
NEGVOCALIZATION: 0
BREATHING: 0
BODYLANGUAGE: 1
BREATHING: 0
TOTALSCORE: 2
CONSOLABILITY: 0
CONSOLABILITY: 2
BODYLANGUAGE: 0
BODYLANGUAGE: 0
BREATHING: 0
NEGVOCALIZATION: 0
BODYLANGUAGE: 0
BODYLANGUAGE: 0
BREATHING: 0
CONSOLABILITY: 0
TOTALSCORE: 0
TOTALSCORE: 2
BODYLANGUAGE: 0
BODYLANGUAGE: 0
BREATHING: 0
FACIALEXPRESSION: 0
FACIALEXPRESSION: 0
BREATHING: 0
NEGVOCALIZATION: 0
BODYLANGUAGE: 0
NEGVOCALIZATION: 1
FACIALEXPRESSION: 0

## 2020-01-01 ASSESSMENT — PAIN SCALES - GENERAL
PAINLEVEL_OUTOF10: 4
PAINLEVEL_OUTOF10: 2
PAINLEVEL_OUTOF10: 4
PAINLEVEL_OUTOF10: 0
PAINLEVEL_OUTOF10: 7
PAINLEVEL_OUTOF10: 0
PAINLEVEL_OUTOF10: 2
PAINLEVEL_OUTOF10: 0
PAINLEVEL_OUTOF10: 7
PAINLEVEL_OUTOF10: 0
PAINLEVEL_OUTOF10: 5
PAINLEVEL_OUTOF10: 0
PAINLEVEL_OUTOF10: 2
PAINLEVEL_OUTOF10: 5
PAINLEVEL_OUTOF10: 0
PAINLEVEL_OUTOF10: 0
PAINLEVEL_OUTOF10: 3
PAINLEVEL_OUTOF10: 0
PAINLEVEL_OUTOF10: 2
PAINLEVEL_OUTOF10: 4
PAINLEVEL_OUTOF10: 0
PAINLEVEL_OUTOF10: 0
PAINLEVEL_OUTOF10: 8
PAINLEVEL_OUTOF10: 2
PAINLEVEL_OUTOF10: 0
PAINLEVEL_OUTOF10: 0
PAINLEVEL_OUTOF10: 5
PAINLEVEL_OUTOF10: 0
PAINLEVEL_OUTOF10: 2
PAINLEVEL_OUTOF10: 0
PAINLEVEL_OUTOF10: 7
PAINLEVEL_OUTOF10: 0
PAINLEVEL_OUTOF10: 7
PAINLEVEL_OUTOF10: 10
PAINLEVEL_OUTOF10: 0
PAINLEVEL_OUTOF10: 0

## 2020-01-01 ASSESSMENT — PATIENT HEALTH QUESTIONNAIRE - PHQ9
SUM OF ALL RESPONSES TO PHQ QUESTIONS 1-9: 0
SUM OF ALL RESPONSES TO PHQ QUESTIONS 1-9: 0
SUM OF ALL RESPONSES TO PHQ9 QUESTIONS 1 & 2: 0
2. FEELING DOWN, DEPRESSED OR HOPELESS: 0
1. LITTLE INTEREST OR PLEASURE IN DOING THINGS: 0

## 2020-01-01 ASSESSMENT — PAIN DESCRIPTION - PAIN TYPE
TYPE: SURGICAL PAIN
TYPE: CHRONIC PAIN
TYPE: ACUTE PAIN
TYPE: SURGICAL PAIN
TYPE: CHRONIC PAIN
TYPE: ACUTE PAIN
TYPE: CHRONIC PAIN

## 2020-01-01 ASSESSMENT — PAIN DESCRIPTION - DESCRIPTORS
DESCRIPTORS: ACHING;DISCOMFORT
DESCRIPTORS: ACHING;DISCOMFORT
DESCRIPTORS: PATIENT UNABLE TO DESCRIBE
DESCRIPTORS: ACHING;SORE
DESCRIPTORS: ACHING

## 2020-01-01 ASSESSMENT — PAIN DESCRIPTION - PROGRESSION
CLINICAL_PROGRESSION: GRADUALLY WORSENING
CLINICAL_PROGRESSION: GRADUALLY IMPROVING
CLINICAL_PROGRESSION: NOT CHANGED

## 2020-01-01 ASSESSMENT — PAIN DESCRIPTION - ORIENTATION
ORIENTATION: RIGHT
ORIENTATION: RIGHT;LEFT

## 2020-01-01 ASSESSMENT — PAIN DESCRIPTION - FREQUENCY
FREQUENCY: INTERMITTENT

## 2020-01-01 ASSESSMENT — PAIN SCALES - WONG BAKER: WONGBAKER_NUMERICALRESPONSE: 10

## 2020-01-01 ASSESSMENT — PAIN - FUNCTIONAL ASSESSMENT: PAIN_FUNCTIONAL_ASSESSMENT: PREVENTS OR INTERFERES WITH MANY ACTIVE NOT PASSIVE ACTIVITIES

## 2020-01-01 ASSESSMENT — PAIN DESCRIPTION - ONSET: ONSET: SUDDEN

## 2020-01-03 NOTE — TELEPHONE ENCOUNTER
Fax from [facility]. Ilia Puna is slower and more tired. Dtr states her voice is getting weak. She has a pale and yellowish cast to her skin. /62, HR 74, no temp, O2 Sat 98%, FSBS 190. Dtr is requesting a UA CS. Any other orders?

## 2020-01-20 NOTE — PROGRESS NOTES
Fair-good ROM of all joints, non tender to palp and or with movement. SKIN: 8 cm skin tear R dorsal hand open with surrounding erythema. 10 cm skin tear R distal forearm, extensor surface with bandage. No ulcerations or breakdown, rash, ecchymosis, or other lesions. PSYCH:  Pleasant and cooperative. Fluid speech, oriented to person, partially to place, but not time. No delusional statements. ASSESSMENT:  Elderly female has Spinal stenosis, lumbar region, without neurogenic claudication; Displacement of lumbar intervertebral disc without myelopathy; L1 vertebral fracture ([address] T12 compression fracture ([address] Back pain; Cerebral atherosclerosis; Type 2 diabetes mellitus with stage 3 chronic kidney disease, with long-term current use of insulin ([address] Mixed hyperlipidemia; Hypothyroidism; Vitamin D deficiency; Benign hypertension; Coronary arteriosclerosis; Late onset Alzheimer's disease with behavioral disturbance ([address] and Recurrent UTI on their problem list.     Diagnoses attached to this encounter:  Diagnoses and all orders for this visit:    Laceration of right hand without foreign body, initial encounter    Other orders  -     clindamycin (CLEOCIN) 300 MG capsule; Take 1 capsule by mouth 3 times daily for 7 days       PLAN:  Clindamycin for wound infection. Continue proper wound care. Recheck 3 weeks. Current Outpatient Medications   Medication Sig Dispense Refill    clindamycin (CLEOCIN) 300 MG capsule Take 1 capsule by mouth 3 times daily for 7 days 21 capsule 0    HUMALOG MIX 75/25 KWIKPEN (75-25) 100 UNIT/ML SUPN injection pen Inject 63 u SC q AM and 25 u SC q PM with dinner 30 pen 11    Blood Glucose Calibration (OT ULTRA/FASTTK CNTRL SOLN) SOLN   5    NOVOFINE AUTOCOVER 30G X 8 MM MISC USE AS DIRECTED TWICE DAILY.  60 each 11    memantine (NAMENDA) 10 MG tablet TAKE 1 TABLET BY MOUTH TWICE DAILY 62 tablet 10    Cholecalciferol (VITAMIN D3) 2000 units CAPS Take 1 capsule by mouth daily  11

## 2020-01-27 NOTE — TELEPHONE ENCOUNTER
----- Message from Terrance Villatoro sent at 1/26/2020 10:03 PM EST -----  Regarding: Poor appetite  Contact: [facility] [place] fax  Starlett Merlin has had a poor appetite at meal times, less than 25%. Please advise.

## 2020-02-25 NOTE — PROGRESS NOTES
2/25/2020    Home visit medically necessary in lieu of an office visit due to: [place] resident, dementia, difficult to get out. Spoke with dtr Blake Lawrence on phone. HPI:  Patient says she is doing well. Staff reports she has not had any problems. Sleeping well at night on melatonin. She has not been up at night wandering. She has not had any issues with anxiety. No recent UTI sxms. Patient says she feels well and has a good appetite. She's moves her bowels daily without constipation or diarrhea. She has not had any falls this month. REVIEW OF SYSTEMS:    GENERAL: Appetite good. Generally healthy, no change in strength or exercise tolerance. LOST SOME WEIGHT RECENTLY. CARDIOVASCULAR: No edema, no chest pains, no murmurs, no palpitations, no syncope, no orthopnea. RESPIRATORY: No shortness of breath, no pain with breathing, no wheezing, no hemoptysis, no cough. GASTROINTESTINAL: No change in appetite, no dysphagia, no heartburn, no abdominal pains, no emesis, no melena, no hemorrhoids. DIARRHEA, CONSTIPATION. GENITOURINARY: No incontinence or retention, no urinary urgency, no nocturia, no dysuria, no change in nature of urine. RECURRENT UTIs. MUSCULOSKELETAL: No pain in muscles or joints, no swelling or redness of joints, no limitation of range of motion, no weakness or numbness. NEURO/PSYCH: No weakness, no tremor, no seizures, no changes in mentation, no ataxia. No depressive symptoms, no changes in sleep habits. DEMENTIA,      All other systems negative.     Allergies   Allergen Reactions    Darvon [Propoxyphene Hcl]     Pcn [Penicillins]      Past Medical History:   Diagnosis Date    Breast cancer ([facility] [address]     RIGHT BREAST MASTECTOMY    CAD (coronary artery disease)     Chronic back pain     Displacement of lumbar intervertebral disc without myelopathy     Forgetfulness     Hypertension     Influenza vaccine administered 03/2019    Memory change     Pneumococcal vaccine administered 03/2019    Spinal stenosis, lumbar region, without neurogenic claudication     Type II or unspecified type diabetes mellitus without mention of complication, not stated as uncontrolled      Past Surgical History:   Procedure Laterality Date    BREAST LUMPECTOMY  4/14/10    CORONARY ARTERY BYPASS GRAFT  2009    QUADRUPLE BYPASS    EYE SURGERY      HYSTERECTOMY  1987    MASTECTOMY  5/6/10     Social History     Socioeconomic History    Marital status:    Tobacco Use    Smoking status: Never Smoker    Smokeless tobacco: Never Used   Substance and Sexual Activity    Alcohol use: No    Drug use: No    Sexual activity: Never     Family History   Problem Relation Age of Onset    High Blood Pressure Mother     Kidney Disease Mother     Cancer Father         BONE & LUNG     PHYSICAL EXAM:   Vitals:    02/25/20 1054   BP: 115/68   Site: Right Wrist   Position: Sitting   Pulse: 62   Resp: 20   SpO2: 96%   Weight: 145 lb 3.2 oz (65.9 kg)   Height: 5' 3\" (1.6 m)     Estimated body mass index is 25.72 kg/m² as calculated from the following:    Height as of this encounter: 5' 3\" (1.6 m). Weight as of this encounter: 145 lb 3.2 oz (65.9 kg). GEN:  elderly WDWN female patient seated in chair in NAD. HEAD:  atraumatic, normocephalic. EYES:  EOMI, PERRL, no cataracts, conjunctivae appear normal.  ENT:   Fair hearing with aids, EACs without wax, TM's normal, nasal septum midline, no congestion, oral cavity without lesions, fair dentition, no partial upper dentures. NECK:  fair-good ROM, no palpable masses, no carotid bruits, no JVD. LUNGS:  clear to ausc, no rales, rhonchi or wheezes. HEART:  RRR, no murmurs or gallops. ABD:  soft, non-tender to palp, no palp masses or HSM, normal BS. BACK:  no scoliosis or kyphosis, non-tender to palp. EXTREMITIES:  No edema, no ulcerations, varicosities or erythema. No gross deformities.  MUSCULOSKELETAL:  Fair-good ROM of all joints, non AM and 25 u SC q PM with dinner 30 pen 11    Blood Glucose Calibration (OT ULTRA/FASTTK CNTRL SOLN) SOLN   5    NOVOFINE AUTOCOVER 30G X 8 MM MISC USE AS DIRECTED TWICE DAILY. 60 each 11    memantine (NAMENDA) 10 MG tablet TAKE 1 TABLET BY MOUTH TWICE DAILY 62 tablet 10    Cholecalciferol (VITAMIN D3) 2000 units CAPS Take 1 capsule by mouth daily  11    levothyroxine (SYNTHROID) 88 MCG tablet Take 1 tablet by mouth every morning (before breakfast)  0    blood glucose test strips (ASCENSIA AUTODISC VI;ONE TOUCH ULTRA TEST VI) strip TEST ONCE DAILY      acetaminophen (TYLENOL) 325 MG tablet Take 650 mg by mouth every 6 hours as needed for Pain or Fever      Melatonin Gummies 2.5 MG CHEW Take 2 each by mouth nightly      Ascorbic Acid (VITAMIN C) 500 MG tablet Take 500 mg by mouth 2 times daily      atorvastatin (LIPITOR) 20 MG tablet Take 20 mg by mouth daily.  torsemide (DEMADEX) 10 MG tablet Take 10 mg by mouth daily.  metoprolol (TOPROL-XL) 50 MG XL tablet Take 50 mg by mouth daily.  lisinopril (PRINIVIL;ZESTRIL) 2.5 MG tablet Take 2.5 mg by mouth daily. No current facility-administered medications for this visit. CARE PLANS:    Dementia Care Plan     Goal:  Encourage stable environment, maintain safety, minimal meds   Provide calming, safe and secure environment with precautions for wandering.  Staff to redirect for episodes of anxiety or agitation.  Medications used only for persistent and difficult to control behaviors that are dangerous to self or others.  Help family understand progression of disease and provide emotional and social support resources. Preventing Falls Care Plan Goals: Increased stability, no falls.  Avoid rugs, slippery surfaces, loose carpet edges or loose items on the floor.  Always wear non-slip footwear. NO SOCKS only!  Make sure area where you are walking is well lit.  Stop and observe before starting.   Notice and use the clear

## 2020-02-28 NOTE — PROGRESS NOTES
[address] [facility]  [address] [facility] [address]  Dept: [phone]       [facility] 29 Minutes for Date of Service January 2020 for diagnosis E11.22 and G30.1     Maude Mcclure RN

## 2020-03-01 NOTE — ED PROVIDER NOTES
Normal heart sounds. No murmur. No friction rub. No gallop. Pulmonary:      Effort: Pulmonary effort is normal. No respiratory distress. Breath sounds: Normal breath sounds. No wheezing, rhonchi or rales. Abdominal:      General: Bowel sounds are normal. There is no distension. Palpations: Abdomen is soft. Tenderness: There is no abdominal tenderness. There is no right CVA tenderness, left CVA tenderness, guarding or rebound. Musculoskeletal:         General: Tenderness (Mild to palpation in the area of the greater trochanter of the left hip) present. No deformity or signs of injury. Right lower leg: No edema. Left lower leg: No edema. Comments: Decreased range of motion in the bilateral hips  Positive straight leg raise on the left side left-sided straight leg raise, negative modified straight leg raise   Lymphadenopathy:      Cervical: No cervical adenopathy. Skin:     General: Skin is warm and dry. Capillary Refill: Capillary refill takes less than 2 seconds. Findings: No bruising, erythema, lesion or rash. Neurological:      General: No focal deficit present. Mental Status: She is alert. Mental status is at baseline. Sensory: No sensory deficit. Motor: No weakness. Coordination: Coordination normal.        Procedures:  No procedures performed. ---------------------------------- PAST HISTORY ---------------------------------------------  Past Medical History:  has a past medical history of Breast cancer ([facility] [address] CAD (coronary artery disease), Chronic back pain, Displacement of lumbar intervertebral disc without myelopathy, Forgetfulness, Hypertension, Influenza vaccine administered, Memory change, Pneumococcal vaccine administered, Spinal stenosis, lumbar region, without neurogenic claudication, and Type II or unspecified type diabetes mellitus without mention of complication, not stated as uncontrolled.     Past Surgical History:  has a past surgical history that includes Hysterectomy (1987); Breast lumpectomy (4/14/10); Mastectomy (5/6/10); Coronary artery bypass graft (2009); and eye surgery. Social History:  reports that she has never smoked. She has never used smokeless tobacco. She reports that she does not drink alcohol or use drugs. Family History: family history includes Cancer in her father; High Blood Pressure in her mother; Kidney Disease in her mother. The patients home medications have been reviewed. Allergies: Darvon [propoxyphene hcl] and Pcn [penicillins]    -------------------------------------------------- RESULTS -------------------------------------------------  Labs:  No results found for this visit on 03/01/20. Radiology:  CT HIP LEFT WO CONTRAST   Final Result   1. No evidence of fracture. 2.  Degenerative changes of the left hip. XR HIP BILATERAL W AP PELVIS (2 VIEWS)   Final Result      Mild to moderate bilateral hip osteoarthritis. No acute osseous abnormality.          ------------------------- NURSING NOTES AND VITALS REVIEWED ---------------------------  Date / Time Roomed:  3/1/2020 10:14 AM  ED Bed Assignment:  14B/14B-14    The nursing notes within the ED encounter and vital signs as below have been reviewed. BP (!) 172/71   Pulse 65   Temp 98 °F (36.7 °C)   Resp 16   Ht 5' 3\" (1.6 m)   Wt 145 lb (65.8 kg)   SpO2 96%   BMI 25.69 kg/m²   Oxygen Saturation Interpretation: Normal    --------------------------------- PROGRESS NOTES / ADDITIONAL PROVIDER NOTES ---------------------------------  Consultations:  As outlined below. ED Course:    ED Course as of Mar 02 0429   Sun Mar 01, 2020   1021 This resident into evaluate the patient. [ML]   1943 The patient is noted to have a positive urine culture from 2/27. I will asked the family if they have any knowledge of this.     [ML]   1051 The patient son-in-law states that neither he nor his wife are aware that the patient currently has a UTI or is receiving treatment for one. [ML]   1226 On reevaluation after the patient has been up ambulating, she now states that she is having pain. We will treat her pain and get a CT of her left hip. [ML]      ED Course User Index  [ML] Briana Browning DO       1247: All results were discussed with the patient and I have provided specific details regarding the plan of care, diagnosis and associated prognosis. The patient tolerated the visit well and, at the time of discharge, she was without objective evidence of hemodynamic instability or an acute process (biological or psychological) requiring hospitalization and inpatient management. She was seen by myself and the assigned attending physician, Dr. Autumn Davila, who agreed with my assessment and plan as laid out herein. The importance of follow-up was discussed at the end of the visit and I recommended that the patient be seen by her primary care physician in 2-3 days. The patient verbalized her understanding and agreement with the plan as presented and stated her intention to follow up with her PCP by calling to schedule an appointment as soon as possible. Reasons to return to the ER or seek immediate evaluation by a medical provider were discussed at length and all questions were answered. The patient was discharged home in stable condition. MDM:  Patient presented from her NH with two days of worsening L hip pain that was reportedly inhibiting her ability to ambulate. On arrival, she was mildly hypertensive with remaining vital signs within normal limits. DDX included, but was not limited to: fracture, dislocation, OA, disc herniation with radiculopathy, MSK strain, sacroiliitis, lumbar canal stenosis    Given the patient's clinical presentation, history and exam, the decision was made to further evaluate her complaints with imaging. The patient's hip xray and CT showed no acute fractures or dislocations.   A previously obtained urine culture was noted to have >100,000 CFUs of E. Coli. Sensitivities also noted in the report. The patient was given a prescription for keflex and discharged back to the nursing home. Discharge Medication List as of 3/1/2020  1:59 PM      START taking these medications    Details   cephALEXin (KEFLEX) 500 MG capsule Take 1 capsule by mouth 2 times daily for 7 days, Disp-14 capsule, R-0Print             Diagnosis:  1. Left hip pain      -Follow-up with PCP in 2-3 days  -Continue all home medications as prescribed    Disposition:  Patient's disposition: Discharge to home  Patient's condition is stable.            Barber Epperson, DO  Resident  03/02/20 5705

## 2020-03-01 NOTE — ED NOTES
Pt ambulated from room 14 to bathroom outside of room 17. Pt gait normal according to son at bedside. Pt c/o some pain with ambulation but tolerable. Pt back in bed. VSS. Will continue to monitor.      Immanuel Olsen RN  03/01/20 7548

## 2020-03-02 NOTE — TELEPHONE ENCOUNTER
Fax from Melissa Case 1947. James Hastings went to [facility] urgent care. Dx: UTI, started on Keflex. James Hastings is requesting something stronger for pain.  Tylenol 650mg Q 6 is ineffective

## 2020-04-21 PROBLEM — N18.4 CKD (CHRONIC KIDNEY DISEASE) STAGE 4, GFR 15-29 ML/MIN (HCC): Status: ACTIVE | Noted: 2020-01-01

## 2020-04-21 NOTE — PROGRESS NOTES
vaccine administered 03/2019    Memory change     Pneumococcal vaccine administered 03/2019    Spinal stenosis, lumbar region, without neurogenic claudication     Type II or unspecified type diabetes mellitus without mention of complication, not stated as uncontrolled      Past Surgical History:   Procedure Laterality Date    BREAST LUMPECTOMY  4/14/10    CORONARY ARTERY BYPASS GRAFT  2009    QUADRUPLE BYPASS    EYE SURGERY      HYSTERECTOMY  1987    MASTECTOMY  5/6/10     Social History     Socioeconomic History    Marital status:    Tobacco Use    Smoking status: Never Smoker    Smokeless tobacco: Never Used   Substance and Sexual Activity    Alcohol use: No    Drug use: No    Sexual activity: Never     Family History   Problem Relation Age of Onset    High Blood Pressure Mother     Kidney Disease Mother     Cancer Father         BONE & LUNG     PHYSICAL EXAM:   Vitals:    04/21/20 0848   BP: 121/62   Site: Left Wrist   Position: Sitting   Pulse: 66   Resp: 20   Temp: 96.6 °F (35.9 °C)   SpO2: 95%   Weight: 147 lb 12.8 oz (67 kg)   Height: 5' 3\" (1.6 m)     Estimated body mass index is 26.18 kg/m² as calculated from the following:    Height as of this encounter: 5' 3\" (1.6 m). Weight as of this encounter: 147 lb 12.8 oz (67 kg). GEN:  elderly WDWN female patient seated in chair in NAD. HEAD:  atraumatic, normocephalic. EYES:  EOMI, PERRL, no cataracts, conjunctivae appear normal.  ENT:   Fair hearing with aids, EACs without wax, TM's normal, nasal septum midline, no congestion, oral cavity without lesions, fair dentition, no partial upper dentures. NECK:  fair-good ROM, no palpable masses, no carotid bruits, no JVD. LUNGS:  clear to ausc, no rales, rhonchi or wheezes. HEART:  RRR, no murmurs or gallops. ABD:  soft, non-tender to palp, no palp masses or HSM, normal BS. BACK:  no scoliosis or kyphosis, non-tender to palp.   EXTREMITIES:  No edema, no ulcerations, varicosities or erythema. No gross deformities. MUSCULOSKELETAL:  Fair-good ROM of all joints, non tender to palp and or with movement. SKIN:  No ulcerations or breakdown, rash, ecchymosis, or other lesions. NEURO:   No tremor, motor UEs 5/5 LEs 5/5, sensory normal, mild ataxia. PSYCH: Pleasant and cooperative. Fluid speech, oriented to person, partially to place, but not time. No delusional statements. Medications reviewed. Labs 3/19/20 GFR 21, BUN/cre 67/2.2, K 5.1, , HDL 72, TSH 3.68, Vit D 41  1/7/20 HH 11/35, GFR 33, lytes nl 10/25/19 GFR 36, lytes   8/26/19 FT4 1.20  7/21/19 UA CS/ mixed mally<10,000  7/9/19 HH 12/35, GFR 33, BUN/cre 38/1.5, lytes nl, LFTs nl, A1c 8.1, , HDL 49, FT4 1.1, TSH 0.22, Vit D 24    ASSESSMENT:  Elderly female has Spinal stenosis, lumbar region, without neurogenic claudication; Displacement of lumbar intervertebral disc without myelopathy; L1 vertebral fracture ([address] T12 compression fracture ([address] Cerebral atherosclerosis; Type 2 diabetes mellitus with stage 3 chronic kidney disease, with long-term current use of insulin ([address] Mixed hyperlipidemia; Hypothyroidism; Vitamin D deficiency; Benign hypertension; Coronary arteriosclerosis; Late onset Alzheimer's disease with behavioral disturbance ([address] Recurrent UTI; and CKD (chronic kidney disease) stage 4, GFR 15-29 ml/min (HCC) on their problem list.     Diagnoses attached to this encounter:  Blaire East was seen today for diabetes, dementia and fall. Diagnoses and all orders for this visit:    Benign hypertension  -     Basic Metabolic Panel;  Future    Coronary arteriosclerosis    Late onset Alzheimer's disease with behavioral disturbance ([address]    Spinal stenosis, lumbar region, without neurogenic claudication    Type 2 diabetes mellitus with stage 3 chronic kidney disease, with long-term current use of insulin (HCC)  -     Hemoglobin A1C; Future    CKD (chronic kidney disease) stage 4, GFR 15-29 ml/min

## 2020-05-05 NOTE — TELEPHONE ENCOUNTER
Fax from Melissa Case 1947. Georgina Saucedo has a 2x2cm corn to side of R foot. She is complaining of pain to the area. Orders?

## 2020-05-08 NOTE — TELEPHONE ENCOUNTER
Fax from Melissa Case 1947. Viky Gaitan has 2+ pitting edema L foot, + pedal pulse, no SOB, lungs clear. Elevating BLE.  /70, HR64

## 2020-05-18 NOTE — TELEPHONE ENCOUNTER
Fax from Melissa Case 1947. Sheri Button c/o upset stomach/nausea. PRN Zofran adm per standing order. Currently on Levaquin for UTI.  T-96.6

## 2020-05-19 PROBLEM — R29.6 FALLS FREQUENTLY: Status: ACTIVE | Noted: 2020-01-01

## 2020-05-19 NOTE — PROGRESS NOTES
5/19/2020    Home visit medically necessary in lieu of an office visit due to: [facility] resident, dementia, difficult to get out. HPI:  Patient says she is doing okay. She had another UTI this month and is currently being treated with Levaquin. She denies UTI sxms now. She has had 2 falls this month but no injuries. She is currently receiving PT for fall prevention. Sleeping well at night on melatonin. She has not been up at night wandering. She has not had any issues with anxiety. Patient says she feels well and has a good appetite. She's moving her bowels daily without constipation or diarrhea. She did have some swelling of feet and ankles but it improved with increase in torsemide. REVIEW OF SYSTEMS:    GENERAL: Appetite good. Generally healthy, no change in strength or exercise tolerance. LOST SOME WEIGHT RECENTLY. CARDIOVASCULAR: No edema, no chest pains, no murmurs, no palpitations, no syncope, no orthopnea. RESPIRATORY: No shortness of breath, no pain with breathing, no wheezing, no hemoptysis, no cough. GASTROINTESTINAL: No change in appetite, no dysphagia, no heartburn, no abdominal pains, no emesis, no melena, no hemorrhoids. HX OF DIARRHEA, CONSTIPATION. GENITOURINARY: No retention, no urinary urgency, no nocturia, no dysuria, no change in nature of urine. INCONTINENCE, RECURRENT UTIs. MUSCULOSKELETAL: No pain in muscles or joints, no swelling or redness of joints, no limitation of range of motion, no weakness or numbness. NEURO/PSYCH: No weakness, no tremor, no seizures, no changes in mentation. No depressive symptoms, no changes in sleep habits. DEMENTIA, FREQ FALLS. All other systems negative.     Allergies   Allergen Reactions    Darvon [Propoxyphene Hcl]     Pcn [Penicillins]     Propoxyphene      Past Medical History:   Diagnosis Date    Breast cancer ([facility] [address]     RIGHT BREAST MASTECTOMY    CAD (coronary artery disease)     Chronic back pain     Displacement of BACK:  no scoliosis or kyphosis, non-tender to palp. EXTREMITIES:  No edema, no ulcerations, varicosities or erythema. No gross deformities. MUSCULOSKELETAL:  Fair-good ROM of all joints, non tender to palp and or with movement. SKIN:  No ulcerations or breakdown, rash, ecchymosis, or other lesions. NEURO:   No tremor, motor UEs 5/5 LEs 5/5, sensory normal, mild ataxia. PSYCH: Pleasant and cooperative. Fluid speech, oriented to person, partially to place, but not time. No delusional statements. Medications reviewed. Labs 4/23/20 BUN/cre 40/1.6, GFR 30, lytes nl, A1c 7.4  3/19/20 GFR 21, BUN/cre 67/2.2, K 5.1, , HDL 72, TSH 3.68, Vit D 41  1/7/20 HH 11/35, GFR 33, lytes nl 10/25/19 GFR 36, lytes   8/26/19 FT4 1.20  7/21/19 UA CS/ mixed mally<10,000  7/9/19 HH 12/35, GFR 33, BUN/cre 38/1.5, lytes nl, LFTs nl, A1c 8.1, , HDL 49, FT4 1.1, TSH 0.22, Vit D 24    ASSESSMENT:  Elderly female has Spinal stenosis, lumbar region, without neurogenic claudication; Displacement of lumbar intervertebral disc without myelopathy; L1 vertebral fracture ([address] T12 compression fracture ([address] Cerebral atherosclerosis; Type 2 diabetes mellitus with stage 3 chronic kidney disease, with long-term current use of insulin ([address] Mixed hyperlipidemia; Hypothyroidism; Vitamin D deficiency; Benign hypertension; Coronary arteriosclerosis; Late onset Alzheimer's disease with behavioral disturbance ([address] Recurrent UTI; CKD (chronic kidney disease) stage 4, GFR 15-29 ml/min ([address] and Falls frequently on their problem list.     Diagnoses attached to this encounter:  Remy Miles was seen today for fall and urinary tract infection.     Diagnoses and all orders for this visit:    Late onset Alzheimer's disease with behavioral disturbance ([address]    Recurrent UTI    Falls frequently    Coronary arteriosclerosis    Benign hypertension    Cerebral atherosclerosis    Type 2 diabetes mellitus with stage 3 chronic kidney disease, with

## 2020-05-28 NOTE — TELEPHONE ENCOUNTER
Fax from Melissa Case 1947. Pedal edema resolved, no redness or SOB. Taking torsemide 5mg BID until swelling is resolved then resume 5mg qd. Would you like to continue BID or resume QD order?

## 2020-06-17 NOTE — PROGRESS NOTES
disc without myelopathy     Forgetfulness     Hypertension     Influenza vaccine administered 03/2019    Memory change     Pneumococcal vaccine administered 03/2019    Spinal stenosis, lumbar region, without neurogenic claudication     Type II or unspecified type diabetes mellitus without mention of complication, not stated as uncontrolled      Past Surgical History:   Procedure Laterality Date    BREAST LUMPECTOMY  4/14/10    CORONARY ARTERY BYPASS GRAFT  2009    QUADRUPLE BYPASS    EYE SURGERY      HYSTERECTOMY  1987    MASTECTOMY  5/6/10     Social History     Socioeconomic History    Marital status:    Tobacco Use    Smoking status: Never Smoker    Smokeless tobacco: Never Used   Substance and Sexual Activity    Alcohol use: No    Drug use: No    Sexual activity: Never     Family History   Problem Relation Age of Onset    High Blood Pressure Mother     Kidney Disease Mother     Cancer Father         BONE & LUNG     PHYSICAL EXAM:   Vitals:    06/17/20 0857   BP: 128/72   Site: Right Wrist   Position: Sitting   Pulse: 62   Resp: 20   Temp: 97.4 °F (36.3 °C)   SpO2: 99%   Weight: 140 lb (63.5 kg)   Height: 5' 3\" (1.6 m)     Estimated body mass index is 24.8 kg/m² as calculated from the following:    Height as of this encounter: 5' 3\" (1.6 m). Weight as of this encounter: 140 lb (63.5 kg). GEN:  elderly WDWN female patient seated in chair in NAD. HEAD:  atraumatic, normocephalic. EYES:  EOMI, PERRL, no cataracts, conjunctivae appear normal.  ENT:   Fair hearing with aids, R EAC with cerumen impaction, TM's normal, nasal septum midline, no congestion, oral cavity without lesions, fair dentition, no partial upper dentures. NECK:  fair-good ROM, no palpable masses, no carotid bruits, no JVD. LUNGS:  clear to ausc, no rales, rhonchi or wheezes. HEART:  RRR, no murmurs or gallops. ABD:  soft, non-tender to palp, no palp masses or HSM, normal BS.    BACK:  no scoliosis or kyphosis, non-tender to palp. EXTREMITIES:  No edema, no ulcerations, varicosities or erythema. No gross deformities. MUSCULOSKELETAL:  Fair-good ROM of all joints, non tender to palp and or with movement. SKIN:  2x2cm stage pressure sore L buttock. No other ulcerations or breakdown, rash, ecchymosis, or other lesions. NEURO:   No tremor, motor UEs 5/5 LEs 5/5, sensory normal, mild ataxia. PSYCH: Pleasant and cooperative. Fluid speech, oriented to person, partially to place, but not time. No delusional statements. Medications reviewed. Labs 4/23/20 BUN/cre 40/1.6, GFR 30, lytes nl, A1c 7.4  3/19/20 GFR 21, BUN/cre 67/2.2, K 5.1, , HDL 72, TSH 3.68, Vit D 41  1/7/20 HH 11/35, GFR 33, lytes nl 10/25/19 GFR 36, lytes   8/26/19 FT4 1.20  7/21/19 UA CS/ mixed mally<10,000  7/9/19 HH 12/35, GFR 33, BUN/cre 38/1.5, lytes nl, LFTs nl, A1c 8.1, , HDL 49, FT4 1.1, TSH 0.22, Vit D 24    ASSESSMENT:  Elderly female has Spinal stenosis, lumbar region, without neurogenic claudication; Displacement of lumbar intervertebral disc without myelopathy; L1 vertebral fracture ([address] T12 compression fracture ([address] Cerebral atherosclerosis; Type 2 diabetes mellitus with stage 3 chronic kidney disease, with long-term current use of insulin ([address] Mixed hyperlipidemia; Hypothyroidism; Vitamin D deficiency; Benign hypertension; Coronary arteriosclerosis; Late onset Alzheimer's disease with behavioral disturbance ([address] Recurrent UTI; CKD (chronic kidney disease) stage 4, GFR 15-29 ml/min ([address] and Falls frequently on their problem list.     Diagnoses attached to this encounter:  Avinash Angelo was seen today for fall, skin problem and ear problem.     Diagnoses and all orders for this visit:    Late onset Alzheimer's disease with behavioral disturbance ([address]    Pressure ulcer of left buttock, stage 2 (HCC)    CKD (chronic kidney disease) stage 4, GFR 15-29 ml/min (HCC)    Recurrent UTI    Hearing loss of right ear due to cerumen impaction    PLAN:  Discussed pressure ulcer care with staff. Needs Roho cushion for WC. Continue fall prevention measures. Continue in protected environment at [facility]. Cleaned R ear wax impaction using Billeau earloop. Tolerated well without pain. TM appears normal. Type 2 DM managed by Dr Antoine Calero at Dr Elias Busch office. Check BMP and A1c in July. Continue other meds as per Rx List. Recheck 1 month. 40 minutes spent on visit, 25 minutes involved education/counseling regarding dementia, skin, ENT, HTN and thyroid disease processes, treatment options, meds and coordination of care.    Current Outpatient Medications   Medication Sig Dispense Refill    Wound Dressings (ALLEVYN ADHESIVE) PADS Apply 1 each topically every 3 days And prn 20 each 2    memantine (NAMENDA) 10 MG tablet TAKE 1 TABLET BY MOUTH TWICE DAILY 62 tablet 11    LANTUS SOLOSTAR 100 UNIT/ML injection pen INJECT 45 UNITS SUB-Q DAILY **DISCARD 28 DAYS AFTER OPENING* 12 mL 11    insulin lispro (HUMALOG) 100 UNIT/ML injection vial Humalog U-100 Insulin 100 unit/mL subcutaneous solution   INJECT SUB-Q VIA S/S BEFORE MEALS:70-99:10U,100-140:11U,141- 160:12U,161-200:13U,201-240:14U 240-280:15U,281-320:16U. *DISCARD 28 DAYS AFTER      torsemide (DEMADEX) 5 MG tablet Take 1 tablet by mouth daily (Give 1 tablet twice daily until swelling resolved) 30 tablet 3    HUMALOG MIX 75/25 KWIKPEN (75-25) 100 UNIT/ML SUPN injection pen Inject 50 u SC q AM and 25 u SC q PM with dinner 30 pen 11    atorvastatin (LIPITOR) 20 MG tablet TAKE 1 TABLET BY MOUTH DAILY AT BEDTIME 90 tablet 10    levothyroxine (SYNTHROID) 88 MCG tablet TAKE ONE(1) TABLET BY MOUTH ONCE DAILY **CYC 90 tablet 10    GLUTOSE 15 40 % GEL USE 1 TUBE AS NEEDED 37.5 g 10    GLUCAGEN HYPOKIT 1 MG SOLR injection USE AS DIRECTED 1MG AS NEEDED 1 each 10    ONE TOUCH ULTRA TEST strip USE TO TEST ONCE DAILY 100 each 10    lisinopril (PRINIVIL;ZESTRIL) 2.5 MG tablet TAKE ONE(1)

## 2020-07-13 NOTE — ED NOTES
Bed: 36  Expected date:   Expected time:   Means of arrival:   Comments:  Srinivas Romero, GAY  07/13/20 0298

## 2020-07-13 NOTE — ED PROVIDER NOTES
Department of Emergency Medicine   ED  Provider Note  Admit Date/RoomTime: 7/13/2020  3:04 PM  ED Room: 36/36   Chief Complaint   Hypoglycemia (Pt was found having a seizure at the facility. Staff checked her sugar and it was 29. Glucagon given and it came up to 54. Two more glucagon given by EMS. Current sugar is 96. )    History of Present Illness   Source of history provided by:  patient and EMS personnel. History/Exam Limitations: dementia. Abdulaziz Mcknight is a 80 y.o. old female who has a past medical history of:   Past Medical History:   Diagnosis Date    Breast cancer ([facility] [address]     RIGHT BREAST MASTECTOMY    CAD (coronary artery disease)     Chronic back pain     Displacement of lumbar intervertebral disc without myelopathy     Forgetfulness     Hypertension     Influenza vaccine administered 03/2019    Memory change     Pneumococcal vaccine administered 03/2019    Spinal stenosis, lumbar region, without neurogenic claudication     Type II or unspecified type diabetes mellitus without mention of complication, not stated as uncontrolled      Patient presents to the emergency department for evaluation of hypoglycemia. Patient was witnessed this morning having possible seizure-like activity. They checked her blood sugar and it was 29. She was administered glucagon and it only came up to 54. She was given oral glucose by EMS and on arrival blood sugar is 96. Patient is currently alert and in no acute distress. She has no complaints at this time. She is unsure if she had dinner or breakfast, she states she does have some memory issues. Medication list shows that she is on mealtime insulin and no oral agents. Prehospital Care: Accu Check ?   was done and result was 29. Oral Glucose Given ?   yes. IV D50 Given ?   no. Dietary / Eating:  unknown. Medication Use/Compliance: compliant all of the time.   Recent Medication Change: No.     ROS    Pertinent positives and negatives are stated within HPI, all other systems reviewed and are negative. Past Surgical History:   Procedure Laterality Date    BREAST LUMPECTOMY  4/14/10    CORONARY ARTERY BYPASS GRAFT  2009    QUADRUPLE BYPASS    EYE SURGERY      HYSTERECTOMY  1987    MASTECTOMY  5/6/10   Social History:  reports that she has never smoked. She has never used smokeless tobacco. She reports that she does not drink alcohol or use drugs. Family History: family history includes Cancer in her father; High Blood Pressure in her mother; Kidney Disease in her mother. Allergies: Darvon [propoxyphene hcl]; Pcn [penicillins]; and Propoxyphene    Physical Exam           ED Triage Vitals [07/13/20 1513]   BP Temp Temp src Pulse Resp SpO2 Height Weight   (!) 113/52 97 °F (36.1 °C) -- 76 17 100 % -- 140 lb (63.5 kg)      Oxygen Saturation Interpretation: Normal.    Constitutional:  Level of Consciousness: Awake and alert. ETOH: No.         Distress: none,  cooperative. Eyes:  PERRL, EOMI, no discharge or conjunctival injection. Ears:  External ears without lesions. Throat:  Pharynx without injection, exudate, or tonsillar hypertrophy. Airway patient. Neck:  Normal ROM. Supple. Respiratory:  Clear to auscultation and breath sounds equal.  CV:  Regular rate and rhythm, normal heart sounds, without pathological murmurs, ectopy, gallops, or rubs. GI:  Abdomen Soft, nontender, good bowel sounds. No firm or pulsatile mass. Back:  No costovertebral tenderness. Integument:  Normal turgor. Warm, dry, without visible rash, unless noted elsewhere. Lymphatics: No lymphangitis or adenopathy noted. Neurological:  Oriented x 2. Motor functions intact.     Lab / Imaging Results   (All laboratory and radiology results have been personally reviewed by myself)  Labs:  Results for orders placed or performed during the hospital encounter of 07/13/20   CBC Auto Differential   Result Value Ref Range    WBC 12.0 (H) 4.5 - 11.5 E9/L Course / Medical Decision Making   Medications - No data to display     Re-Evaluations:  7/13/20      Patients symptoms are improving. Consultations:             None    Procedures:   none    MDM: Patient presents to the emergency department for evaluation of hypoglycemia. Brain CAT scan is normal and labs are unremarkable. Patient was given a meal here in the ED. patient will be discharged back to the nursing home in stable condition. Counseling:   I have spoken with the daughter and patient and discussed todays results, in addition to providing specific details for the plan of care and counseling regarding the diagnosis and prognosis and are agreeable with the plan. Assessment      1. Hypoglycemia      This patient's ED course included: a personal history and physicial examination  This patient has remained hemodynamically stable during their ED course. Plan   Discharge to nursing home. Patient condition is stable. New Medications     New Prescriptions    No medications on file     Electronically signed by Suzy Saucedo DO   DD: 7/13/20  **This report was transcribed using voice recognition software. Every effort was made to ensure accuracy; however, inadvertent computerized transcription errors may be present.   END OF PROVIDER NOTE        Suzy Saucedo DO  07/13/20 2026

## 2020-07-14 NOTE — TELEPHONE ENCOUNTER
Fax from [facility] 1947. Tanya Handler returned from the ER. Ok to continue orders as before hospital visit?

## 2020-07-26 NOTE — PROGRESS NOTES
7/27/2020    Home visit medically necessary in lieu of an office visit due to: [facility] resident, dementia, difficult to get out. HPI:  Patient says she is doing okay. She had a UTI early in the month and was tx'd with Levaquin. She says she is having no UTI sxms now. She had a hypoglycemic episode with FSBS of 29 on 7/13/20 and was sent to ED for treatment. DM managed by Dr Milla Del Castillo. She has had no more low FSBS. She has had no recent falls. Staff report she is unsteady walking and staying in [facility] most of the time now. She continues sleeping well at night on melatonin. Patient says she feels well and has a good appetite and wt is up 6 lbs. She's moving her bowels daily without constipation or diarrhea. No swelling of feet or ankles on torsemide. REVIEW OF SYSTEMS:    GENERAL: Appetite good. Generally healthy, no change in strength or exercise tolerance. LOST SOME WEIGHT RECENTLY. CARDIOVASCULAR: No edema, no chest pains, no murmurs, no palpitations, no syncope, no orthopnea. RESPIRATORY: No shortness of breath, no pain with breathing, no wheezing, no hemoptysis, no cough. GASTROINTESTINAL: No change in appetite, no dysphagia, no heartburn, no abdominal pains, no emesis, no melena, no hemorrhoids. HX OF DIARRHEA, CONSTIPATION. GENITOURINARY: No retention, no urinary urgency, no nocturia, no dysuria, no change in nature of urine. INCONTINENCE, RECURRENT UTIs. MUSCULOSKELETAL: No pain in muscles or joints, no swelling or redness of joints, no limitation of range of motion, no weakness or numbness. NEURO/PSYCH: No weakness, no tremor, no seizures, no changes in mentation. No depressive symptoms, no changes in sleep habits. DEMENTIA, FREQ FALLS. All other systems negative.     Allergies   Allergen Reactions    Darvon [Propoxyphene Hcl]     Pcn [Penicillins]     Propoxyphene      Past Medical History:   Diagnosis Date    Breast cancer ([facility] [address]     RIGHT BREAST MASTECTOMY    CAD (coronary artery disease)     Chronic back pain     Displacement of lumbar intervertebral disc without myelopathy     Forgetfulness     Hypertension     Influenza vaccine administered 03/2019    Memory change     Pneumococcal vaccine administered 03/2019    Spinal stenosis, lumbar region, without neurogenic claudication     Type II or unspecified type diabetes mellitus without mention of complication, not stated as uncontrolled      Past Surgical History:   Procedure Laterality Date    BREAST LUMPECTOMY  4/14/10    CORONARY ARTERY BYPASS GRAFT  2009    QUADRUPLE BYPASS    EYE SURGERY      HYSTERECTOMY  1987    MASTECTOMY  5/6/10     Social History     Socioeconomic History    Marital status:    Tobacco Use    Smoking status: Never Smoker    Smokeless tobacco: Never Used   Substance and Sexual Activity    Alcohol use: No    Drug use: No    Sexual activity: Never     Family History   Problem Relation Age of Onset    High Blood Pressure Mother     Kidney Disease Mother     Cancer Father         BONE & LUNG     PHYSICAL EXAM:   Vitals:    07/27/20 0901   BP: 120/70   Pulse: 76   Resp: 20   Temp: 97.5 °F (36.4 °C)   SpO2: 95%   Weight: 145 lb 9.6 oz (66 kg)   Height: 5' 3\" (1.6 m)     Estimated body mass index is 25.79 kg/m² as calculated from the following:    Height as of this encounter: 5' 3\" (1.6 m). Weight as of this encounter: 145 lb 9.6 oz (66 kg). GEN:  elderly WDWN female patient seated in chair in NAD. HEAD:  atraumatic, normocephalic. EYES:  EOMI, PERRL, no cataracts, conjunctivae appear normal.  ENT:   Fair hearing with aids, EACs andTM's normal, nasal septum midline, no congestion, oral cavity without lesions, fair dentition, no partial upper dentures. NECK:  fair-good ROM, no palpable masses, no carotid bruits, no JVD. LUNGS:  clear to ausc, no rales, rhonchi or wheezes. HEART:  RRR, no murmurs or gallops. ABD:  soft, non-tender to palp, no palp masses or HSM, normal BS. BACK:  no scoliosis or kyphosis, non-tender to palp. EXTREMITIES:  No edema, no ulcerations, varicosities or erythema. No gross deformities. MUSCULOSKELETAL:  Fair-good ROM of all joints, non tender to palp and or with movement. SKIN:  Pressure sore L buttock healed. No other ulcerations or breakdown, rash, ecchymosis, or other lesions. NEURO:   No tremor, motor UEs 5/5 LEs 5/5, sensory normal, mild ataxia. PSYCH: Pleasant and cooperative. Fluid speech, oriented to person, partially to place, but not time. No delusional statements. Medications reviewed. Labs 7/13/20 HH 11/35, GFR 36, lytes nl, alk phos 171, TSH 3.1, 4/23/20 BUN/cre 40/1.6, GFR 30, lytes nl, A1c 7.4  3/19/20 GFR 21, BUN/cre 67/2.2, K 5.1, , HDL 72, TSH 3.68, Vit D 41  1/7/20 HH 11/35, GFR 33, lytes nl 10/25/19 GFR 36, lytes   8/26/19 FT4 1.20  7/21/19 UA CS/ mixed mally<10,000  7/9/19 HH 12/35, GFR 33, BUN/cre 38/1.5, lytes nl, LFTs nl, A1c 8.1, , HDL 49, FT4 1.1, TSH 0.22, Vit D 24    ASSESSMENT:  Elderly female has Spinal stenosis, lumbar region, without neurogenic claudication; Displacement of lumbar intervertebral disc without myelopathy; L1 vertebral fracture ([address] T12 compression fracture ([address] Cerebral atherosclerosis; Type 2 diabetes mellitus with stage 3 chronic kidney disease, with long-term current use of insulin ([address] Mixed hyperlipidemia; Hypothyroidism; Vitamin D deficiency; Benign hypertension; Coronary arteriosclerosis; Late onset Alzheimer's disease with behavioral disturbance ([address] Recurrent UTI; CKD (chronic kidney disease) stage 4, GFR 15-29 ml/min ([address] and Falls frequently on their problem list.     Diagnoses attached to this encounter:  Idalmis Mae was seen today for dementia and hypertension. Diagnoses and all orders for this visit:    Late onset Alzheimer's disease with behavioral disturbance ([facility] [address]    Benign hypertension  -     Cancel: Basic Metabolic Panel;  Future    CKD (chronic kidney disease) MOUTH ONCE DAILY 31 tablet 11    metoprolol succinate (TOPROL XL) 50 MG extended release tablet Take 1 tablet by mouth daily Do not crush or chew. 1 tablet 10    Blood Glucose Calibration (OT ULTRA/FASTTK CNTRL SOLN) SOLN   5    NOVOFINE AUTOCOVER 30G X 8 MM MISC USE AS DIRECTED TWICE DAILY. 60 each 11    Cholecalciferol (VITAMIN D3) 2000 units CAPS Take 1 capsule by mouth daily  11    acetaminophen (TYLENOL) 325 MG tablet Take 650 mg by mouth every 6 hours as needed for Pain or Fever      Melatonin Gummies 2.5 MG CHEW Take 2 each by mouth nightly      Ascorbic Acid (VITAMIN C) 500 MG tablet Take 500 mg by mouth 2 times daily       No current facility-administered medications for this visit. Return in about 1 month (around 8/27/2020). An  electronic signature was used to authenticate this note.     --Lainey Norman MD on 7/27/2020 at 9:53 AM

## 2020-07-30 PROBLEM — S22.000A THORACIC COMPRESSION FRACTURE, CLOSED, INITIAL ENCOUNTER (HCC): Status: ACTIVE | Noted: 2020-01-01

## 2020-07-30 NOTE — ED NOTES
[facility] refusing to take pt back, concerned about compression fracture. Pt to be admitted and pending admission to nursing facility. [facility] called and transport cancelled.       Lino Mittal RN  07/30/20 3800

## 2020-07-30 NOTE — ED NOTES
Patient evaluated for a fall from nursing facility. Reportedly she is on the independent living side. With her inability to ambulate and this compression fracture seen today she will be admitted to the hospital and eventually discharged to the full nursing home side. There is facility states that they are unable to take her back as she is under her current status. Spoke with Dr. Tracy Fernandez, will admit the patient.      Carey Wright DO  07/30/20 9688

## 2020-07-30 NOTE — ED NOTES
Bed: 38  Expected date: 7/30/20  Expected time:   Means of arrival: [facility]  Comments:  ems     Lucio Ledbetter RN  07/30/20 5971

## 2020-07-30 NOTE — ED NOTES
Family updated on pt status     [facility]  07/30/20 2008

## 2020-07-30 NOTE — ED PROVIDER NOTES
Department of Emergency Medicine   ED  Provider Note  Admit Date/RoomTime: 7/30/2020  1:12 PM  ED Room: [facility]          History of Present Illness:  7/30/20, Time: 2:33 PM EDT  Chief Complaint   Patient presents with    Back Pain     after fall                Ardeth Boeck is a 80 y.o. female presenting to the ED for fall. Patient had a mechanical fall at her nursing home. Unclear if she hit her head, there is no loss of conscious. She is on no anticoagulation. She only complains of low back pain, aching in nature, move makes it worse, rest makes it better. Denies any change in bowel bladder. Denies any paresthesias. Denies any abdominal, neck pain, nausea, vomiting, blurred vision, weakness in extremities, chest pain, short breath, cough, sputum, or any other symptoms or complaints. Review of Systems:   Pertinent positives and negatives are stated within HPI, all other systems reviewed and are negative.        --------------------------------------------- PAST HISTORY ---------------------------------------------  Past Medical History:  has a past medical history of Breast cancer ([facility] [address] CAD (coronary artery disease), Chronic back pain, Displacement of lumbar intervertebral disc without myelopathy, Forgetfulness, Hypertension, Influenza vaccine administered, Memory change, Pneumococcal vaccine administered, Spinal stenosis, lumbar region, without neurogenic claudication, and Type II or unspecified type diabetes mellitus without mention of complication, not stated as uncontrolled. Past Surgical History:  has a past surgical history that includes Hysterectomy (1987); Breast lumpectomy (4/14/10); Mastectomy (5/6/10); Coronary artery bypass graft (2009); and eye surgery. Social History:  reports that she has never smoked. She has never used smokeless tobacco. She reports that she does not drink alcohol or use drugs.     Family History: family history includes Cancer in her father; High Blood Pressure in her mother; Kidney Disease in her mother. . Unless otherwise noted, family history is non contributory    The patients home medications have been reviewed. Allergies: Darvon [propoxyphene hcl]; Pcn [penicillins]; and Propoxyphene        ---------------------------------------------------PHYSICAL EXAM--------------------------------------    Constitutional/General: Alert and oriented x 1  Head: Normocephalic and atraumatic  Eyes: PERRL, EOMI, sclera non icteric  Mouth: Oropharynx clear, handling secretions, no trismus, no asymmetry of the posterior oropharynx or uvular edema  Neck: Supple, full ROM, no stridor, no meningeal signs  Respiratory: Lungs clear to auscultation bilaterally, no wheezes, rales, or rhonchi. Not in respiratory distress  Cardiovascular:  Regular rate. Regular rhythm. 2+ distal pulses. Equal extremity pulses. Chest: No chest wall tenderness  Back: Lumbar tenderness to palpation, no step-offs, no other signs of injury or trauma  GI:  Abdomen Soft, Non tender, Non distended. No rebound, guarding, or rigidity. No pulsatile masses. Musculoskeletal: Moves all extremities x 4. Warm and well perfused, no clubbing, cyanosis, or edema. Capillary refill <3 seconds. Tenderness palpation over the lateral left hip and femur, no gross signs of injury or trauma  Integument: skin warm and dry. No rashes. Neurologic: GCS 14  Psychiatric: Normal Affect          -------------------------------------------------- RESULTS -------------------------------------------------  I have personally reviewed all laboratory and imaging results for this patient. Results are listed below. LABS: (Lab results interpreted by me)  No results found for this visit on 07/30/20.,       RADIOLOGY:  Interpreted by Radiologist unless otherwise specified  CT HEAD WO CONTRAST   Final Result      No evidence of acute intracranial hemorrhage or edema.                   CT CERVICAL SPINE WO CONTRAST   Final Result   No evidence of fracture or dislocation of cervical spine. CT LUMBAR SPINE WO CONTRAST   Final Result      1. No acute fracture or lumbar spine. 2. Stable compression fracture involving T12 vertebral body of   approximately 90% with retropulsion. CT THORACIC SPINE WO CONTRAST   Final Result      1. No acute fracture of thoracic spine. 2. Stable compression fracture of 90% involving T12 vertebral body   with retropulsion and central canal stenosis. XR KNEE LEFT (MIN 4 VIEWS)   Final Result   No acute process               XR FEMUR LEFT (MIN 2 VIEWS)   Final Result      No fracture or dislocation of the left femur. XR HIP LEFT (2-3 VIEWS)   Final Result      No fracture or dislocation of left hip. EKG Interpretation  Interpreted by emergency department physician, Dr. Carlon Spatz         ------------------------- NURSING NOTES AND VITALS REVIEWED ---------------------------   The nursing notes within the ED encounter and vital signs as below have been reviewed by myself  /62   Pulse 66   Temp 97.3 °F (36.3 °C)   SpO2 97%     Oxygen Saturation Interpretation: Normal    The patients available past medical records and past encounters were reviewed. ------------------------------ ED COURSE/MEDICAL DECISION MAKING----------------------  Medications - No data to display          Medical Decision Making:    Imaging reviewed. Reevaluation, patient's resting comfortably. Exam unchanged, no focal neurological deficits in the lower extremities. She has no decreased sensation and strength. Pain is controlled. Therefore, patient be discharged, she is to follow-up with her PCP in 1 to 2 days. She is educated on signs and symptoms that require emergent evaluation. Counseling:    The emergency provider has spoken with the patient and discussed todays results, in addition to providing specific details for the plan of care and counseling regarding the diagnosis and prognosis. Questions are answered at this time and they are agreeable with the plan.       --------------------------------- IMPRESSION AND DISPOSITION ---------------------------------    IMPRESSION  1. Fall, initial encounter        DISPOSITION  Disposition: Discharge to home  Patient condition is stable        NOTE: This report was transcribed using voice recognition software.  Every effort was made to ensure accuracy; however, inadvertent computerized transcription errors may be present       Lionel Burns MD  07/30/20 [id number]

## 2020-07-30 NOTE — ED NOTES
Spoke to Rebecca escobar at [address] [place] [address], patient lives on assisted living side and needs to be on nursing home side. Patient unable to ambulate independently and is in pain. Notified Dayana Aguilar who has notified Laurence Allen. Dr Gomez Makeoscar aware and plan of care updated.       Ray Knight  07/30/20 7758
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
## 2020-07-31 NOTE — H&P
Fátima Alatorre M.D. History and Physical      CHIEF COMPLAINT: fall     Reason for Admission:  Fracture t12 compression     History Obtained From:  Pt emr     HISTORY OF PRESENT ILLNESS:      The patient is a 80 y.o. female of Felecia Diaz MD with significant past medical history of breast ca, cad, spinal stenosis  who presents with complains of having sustained a fall at the facility at which she resides. She was walking with her friends and her friend tripped and then she tripped, they both fell. She felt pain in her back. She is not a good historian. BP (!) 148/67   Pulse 63   Temp 97.4 °F (36.3 °C)   Resp 16   Ht 5' 2.99\" (1.6 m)   Wt 145 lb 8.1 oz (66 kg)   SpO2 99%   BMI 25.78 kg/m²   Ct lumbar spine with stable compression fracture with 90%retropulsion .       Past Medical History:        Diagnosis Date    Breast cancer ([address]     RIGHT BREAST MASTECTOMY    CAD (coronary artery disease)     Chronic back pain     Displacement of lumbar intervertebral disc without myelopathy     Forgetfulness     Hypertension     Influenza vaccine administered 03/2019    Memory change     Pneumococcal vaccine administered 03/2019    Spinal stenosis, lumbar region, without neurogenic claudication     Type II or unspecified type diabetes mellitus without mention of complication, not stated as uncontrolled      Past Surgical History:        Procedure Laterality Date    BREAST LUMPECTOMY  4/14/10    CORONARY ARTERY BYPASS GRAFT  2009    QUADRUPLE BYPASS    EYE SURGERY      HYSTERECTOMY  1987    MASTECTOMY  5/6/10         Medications Prior to Admission:    Medications Prior to Admission: insulin lispro, 1 Unit Dial, (HUMALOG KWIKPEN) 100 UNIT/ML SOPN, Humalog KwikPen (U-100) Insulin 100 unit/mL subcutaneous  Inject up to 18 units QAC TID as per SS.  blood glucose test strips (ACCU-CHEK LEONARDO) strip, 1 each by In Vitro route daily Test blood sugar daily  Wound Dressings (ALLEVYN ADHESIVE) PADS, Apply 1 each topically every 3 days And prn  memantine (NAMENDA) 10 MG tablet, TAKE 1 TABLET BY MOUTH TWICE DAILY  LANTUS SOLOSTAR 100 UNIT/ML injection pen, INJECT 45 UNITS SUB-Q DAILY **DISCARD 28 DAYS AFTER OPENING*  torsemide (DEMADEX) 5 MG tablet, Take 1 tablet by mouth daily (Give 1 tablet twice daily until swelling resolved)  HUMALOG MIX 75/25 KWIKPEN (75-25) 100 UNIT/ML SUPN injection pen, Inject 50 u SC q AM and 25 u SC q PM with dinner  atorvastatin (LIPITOR) 20 MG tablet, TAKE 1 TABLET BY MOUTH DAILY AT BEDTIME  levothyroxine (SYNTHROID) 88 MCG tablet, TAKE ONE(1) TABLET BY MOUTH ONCE DAILY **CYC  GLUTOSE 15 40 % GEL, USE 1 TUBE AS NEEDED  GLUCAGEN HYPOKIT 1 MG SOLR injection, USE AS DIRECTED 1MG AS NEEDED  lisinopril (PRINIVIL;ZESTRIL) 2.5 MG tablet, TAKE ONE(1) TABLET BY MOUTH ONCE DAILY  metoprolol succinate (TOPROL XL) 50 MG extended release tablet, Take 1 tablet by mouth daily Do not crush or chew. Blood Glucose Calibration (OT ULTRA/FASTTK CNTRL SOLN) SOLN,   NOVOFINE AUTOCOVER 30G X 8 MM MISC, USE AS DIRECTED TWICE DAILY. Cholecalciferol (VITAMIN D3) 2000 units CAPS, Take 1 capsule by mouth daily  acetaminophen (TYLENOL) 325 MG tablet, Take 650 mg by mouth every 6 hours as needed for Pain or Fever  Melatonin Gummies 2.5 MG CHEW, Take 2 each by mouth nightly  Ascorbic Acid (VITAMIN C) 500 MG tablet, Take 500 mg by mouth 2 times daily    Allergies:  Darvon [propoxyphene hcl]; Pcn [penicillins]; and Propoxyphene    Social History:   TOBACCO:   reports that she has never smoked. She has never used smokeless tobacco.  ETOH:   reports no history of alcohol use.   MARITAL STATUS:    OCCUPATION:      Family History:       Problem Relation Age of Onset    High Blood Pressure Mother     Kidney Disease Mother     Cancer Father         BONE & LUNG       REVIEW OF SYSTEMS:    General ROS: fall at facility   Hematological and Lymphatic ROS: negative  Endocrine ROS: negative  Respiratory ROS: no cough, shortness of breath, or wheezing  Cardiovascular ROS: no chest pain or dyspnea on exertion  Gastrointestinal ROS: no abdominal pain, change in bowel habits, or black or bloody stools  Genito-Urinary ROS: no dysuria, trouble voiding, or hematuria  Neurological ROS: no TIA or stroke symptoms  negative    Vitals:  BP (!) 148/67   Pulse 63   Temp 97.4 °F (36.3 °C)   Resp 16   Ht 5' 2.99\" (1.6 m)   Wt 145 lb 8.1 oz (66 kg)   SpO2 99%   BMI 25.78 kg/m²     PHYSICAL EXAM:  General:  Awake, alert, oriented X 3. Well developed, well nourished, well groomed. No apparent distress. HEENT:  Normocephalic, atraumatic. Pupils equal, round, reactive to light. No scleral icterus. No conjunctival injection. Normal lips, teeth, and gums. No nasal discharge. Neck:  Supple  Heart:  RRR, no murmurs, gallops, rubs, carotid upstroke normal, no carotid bruits  Lungs:  CTA bilaterally, bilat symmetrical expansion, no wheeze, rales, or rhonchi  Abdomen: Bowel sounds present, soft, nontender, no masses, no organomegaly, no peritoneal signs  Extremities:  No clubbing, cyanosis, or edema  Skin:  Warm and dry, no open lesions or rash  Neuro:  Cranial nerves 2-12 intact, no focal deficits  Breast: deferred  Rectal: deferred  Genitalia:  deferred      DATA:     Recent Labs     07/31/20  0601   WBC 13.0*   HGB 12.1        Recent Labs     07/31/20  0601      K 3.8   BUN 24*   CREATININE 1.1*     No results for input(s): PROT, INR in the last 72 hours. No results for input(s): AST, ALT, ALKPHOS, BILIDIR, BILITOT in the last 72 hours. No results for input(s): BNP in the last 72 hours. No results for input(s): CKTOTAL, CKMB, CKMBINDEX, TROPONINI in the last 72 hours. ASSESSMENT:      Active Problems:    Thoracic compression fracture, closed, initial encounter ([facility]  Resolved Problems:    * No resolved hospital problems.  *        PLAN:    Admit to [facility] for eval of thoracic compression fracture   Pain control Neurosurgery eval   leukocytosis is reactive   If no plans for intervention, will await till she can get set up at [facility] as [facility] will not take her back    She has no pain and apparently ambulated this am   Social service to assist in appropriate placement   iss for elevated blood sugars       Katharina Arce MD  7/31/2020  12:36 PM

## 2020-07-31 NOTE — DISCHARGE INSTR - COC
F02.81    Recurrent UTI N39.0    CKD (chronic kidney disease) stage 4, GFR 15-29 ml/min ([facility]) N18.4    Falls frequently R29.6    Thoracic compression fracture, closed, initial encounter ([facility] S22.000A       Isolation/Infection:   Isolation          No Isolation        Patient Infection Status     Infection Onset Added Last Indicated Last Indicated By Review Planned Expiration Resolved Resolved By    None active    Resolved    COVID-19 Rule Out 07/29/20 07/29/20 07/29/20 Covid-19 Ambulatory (Ordered)   07/30/20 Rule-Out Test Resulted          Nurse Assessment:  Last Vital Signs: BP (!) 148/67   Pulse 63   Temp 97.4 °F (36.3 °C)   Resp 16   Ht 5' 2.99\" (1.6 m)   Wt 145 lb 8.1 oz (66 kg)   SpO2 99%   BMI 25.78 kg/m²     Last documented pain score (0-10 scale): Pain Level: 4  Last Weight:   Wt Readings from Last 1 Encounters:   07/31/20 145 lb 8.1 oz (66 kg)     Mental Status:  disoriented, alert and coherent    IV Access:  - None    Nursing Mobility/ADLs:  Walking   Assisted  Transfer  Assisted  Bathing  Assisted  Dressing  Assisted  Toileting  Assisted  Feeding  Assisted  Med Admin  Assisted  Med Delivery   whole    Wound Care Documentation and Therapy:        Elimination:  Continence:   · Bowel: Yes  · Bladder: Yes  Urinary Catheter: None   Colostomy/Ileostomy/Ileal Conduit: No       Date of Last BM:n/a    Intake/Output Summary (Last 24 hours) at 7/31/2020 1320  Last data filed at 7/31/2020 0858  Gross per 24 hour   Intake 480 ml   Output --   Net 480 ml     No intake/output data recorded. Safety Concerns:     History of Falls (last 30 days) and At Risk for Falls    Impairments/Disabilities:      None    Nutrition Therapy:  Current Nutrition Therapy:   - Oral Diet:  Carb Control 4 carbs/meal (1800kcals/day)    Routes of Feeding: Oral  Liquids:  Thin Liquids  Daily Fluid Restriction: no  Last Modified Barium Swallow with Video (Video Swallowing Test): not done    Treatments at the Time of Hospital Discharge:   Respiratory Treatments: n/a  Oxygen Therapy:  is not on home oxygen therapy. Ventilator:    - No ventilator support    Rehab Therapies: Physical Therapy and Occupational Therapy  Weight Bearing Status/Restrictions: No weight bearing restirctions  Other Medical Equipment (for information only, NOT a DME order):  walker and hospital bed  Other Treatments: n/a    Patient's personal belongings (please select all that are sent with patient):  None    RN SIGNATURE:  Electronically signed by Morgan Whitehead RN on 8/5/20 at 11:14 AM EDT    CASE MANAGEMENT/SOCIAL WORK SECTION    Inpatient Status Date: ***    Readmission Risk Assessment Score:  Readmission Risk              Risk of Unplanned Readmission:        18           Discharging to Facility/ Agency   · Name: [facility]  · Address:  · Phone:  · Fax:    Dialysis Facility (if applicable)   · Name:  · Address:  · Dialysis Schedule:  · Phone:  · Fax:    / signature: Electronically signed by Diogenes Ponce on 7/31/20 at 1:20 PM EDT    PHYSICIAN SECTION    Prognosis: Good    Condition at Discharge: Stable    Rehab Potential (if transferring to Rehab): Good    Recommended Labs or Other Treatments After Discharge: CBC and BMP in 3 days    Physician Certification: I certify the above information and transfer of Milli Mora  is necessary for the continuing treatment of the diagnosis listed and that she requires Assisted Living for greater 30 days.      Update Admission H&P: No change in H&P    PHYSICIAN SIGNATURE:  Electronically signed by Indira Feliz MD on 8/4/20 at 1:20 PM EDT

## 2020-07-31 NOTE — PROGRESS NOTES
Occupational Therapy  OCCUPATIONAL THERAPY INITIAL EVALUATION      Date:2020  Patient Name: Jonas Ruelas  MRN: [id number]  : 1933  Room: [address] [place] [address]    Evaluating OT: Evelia Phillips, OTR/L #[id number]    Referring physician: Douglas Hickey MD   AM-PAC Daily Activity Raw Score: 1524  Recommended Adaptive Equipment: ww, shower seat, elevated commode     Diagnosis: T compression fx. T 12   Surgery:   Past Surgical History:   Procedure Laterality Date    BREAST LUMPECTOMY  4/14/10    CORONARY ARTERY BYPASS GRAFT      QUADRUPLE BYPASS    EYE SURGERY      HYSTERECTOMY  1987    MASTECTOMY  5/6/10        Pertinent Medical History:   Past Medical History:   Diagnosis Date    [facility] ([facility]     RIGHT BREAST MASTECTOMY    CAD (coronary artery disease)     Chronic back pain     Displacement of lumbar intervertebral disc without myelopathy     Forgetfulness     Hypertension     Influenza vaccine administered 2019    Memory change     Pneumococcal vaccine administered 2019    Spinal stenosis, lumbar region, without neurogenic claudication     Type II or unspecified type diabetes mellitus without mention of complication, not stated as uncontrolled         Precautions:  Falls, spinal neutral mechanics, safety- sitter, R eye impaired      Home Living: Pt lives at [facility]    Bathroom setup: handicap accessible   Equipment owned: possible walker and shower seat  Prior Level of Function:   assisted with ADLs ,  dependent with IADLs; using ? ? No AD? for ambulation.    Driving: no                           Medication Management assist  Occupation: enjoys activities at NH    Pain Level: L heel pain - min  Cognition: A&O: 1/3; Follows 1-2 step directions required repeated cues for safety   Memory:  poor   Sequencing:  fair    Problem solving:  poor   Judgement/safety:  poor     Functional Assessment:   Initial Eval Status  Date: 20 Treatment Status  Date: Short Term Goals=LTG  Treatment frequency: PRN 1-34 x/week   Feeding supervision      Grooming Min A standing  supervision    UB Dressing Min A   supervision   LB Dressing Max A   Min A with AE prn to maintain spinal neutral mechanics    Bathing N/t simulated mod A   Min A with LHS prn    Toileting Mod A with increased cueing  Min A with ww and elevated commode   Bed Mobility  Supine to sit: min A    Sit to supine: n/t  Supine to sit: mod I    Sit to supine: mod I    Functional Transfers Sit to stand: min A   Stand to sit:  Min A   Stand pivot: mod A with cues for walker safety and sequencing  SBA   Functional Mobility Min - mod A with ww and increased cues  SBA with ww   Balance Sitting:     Static:  SBA    Dynamic:CGA  Standing: min A      Activity Tolerance Fair+ L heel pain- no back pain  good   Visual/  Perceptual Glasses: yes  Not present noted to close R eye and poor awareness of R side in toileting         Safety fair                 Fair+     Hand dominance: R   UE ROM: RUE:  WFL  LUE:  WFL  Strength: RUE:  4/5 LUE:  4/5   Strength: B WFL  Fine Motor Coordination:  WFL    Hearing: WFL  Sensation: decrease in LLE otherwise No c/o numbness or tingling  Tone:  WFL  Edema: none noted                            Comments: Nursing approval to work with patient given. Upon arrival, patient supine and agreeable to evaluation. OT evaluation performed with  education on benefits of mobility and safety with ADL completion. Patient with sitter and motivated to move. At end of session, patient sitting up in chair and  with call light and phone within reach, all lines and tubes intact. Nursing notified. OT treatment: OT for functional assessment of  ADL, Functional Transfer/Mobility Training, Equipment Needs, Energy Conservation Techniques, Pt/Family Education, Ther Ex- deep breathing for edema and pain control., OT role and POC reviewed. Patient  demo fair understanding of spinal neutral mechanics and safety with mobility and LE dressing and bathing.   Skilled occupational therapy services provided include instruction/training on safety and adapted techniques for completion of therapeutic activities, ADLs/IADLs, and therapeutic exercise- deep breathig for pain control. Therapist educated pt on spinal neutral precautions. Therapist facilitated bed mobility, functional transfers, graded functional activities (static/dynamic sitting, static/dynamic standing with ww, functional reaching), and functional ambulation - providing mod cuing (verbal, visual, tactile) on AD management,ww hand placement, body mechanics, posture, breathing techniques, energy conservation, compensatory strategies, and safety. Therapist facilitated self-care retraining: UB dressing, LB dressing, grooming, toileting, bathing simulated tasks - providing mod  cuing (verbal, visual, tactile) on body mechanics, posture, breathing techniques, energy conservation, compensatory strategies, and safety. Therapist educated pt on compensatory strategies/energy conservation techniques to safely complete ADLs/IADLs. Skilled monitoring of O2 sats, HR, and pt response throughout treatment. would benefit from continued skilled OT to increase functional independence and quality of life.     Eval Complexity: mod     Assessment of current deficits   Functional mobility [x]  ADLs [x] Strength []  Cognition []  Functional transfers  [x] IADLs [x] Safety Awareness [x]  Endurance [x]  Fine Motor Coordination [] Balance [x] Vision/perception [x] Sensation [x]   Gross Motor Coordination [] ROM [] Delirium []                  Motor Control []    Plan of Care: 2-4 days     ADL retraining [x]   Equipment needs [x]   Neuromuscular re-education [] Energy Conservation Techniques [x]  Functional Transfer training [x] Patient and/or Family Education [x]  Functional Mobility training [x]  Environmental Modifications [x]  Cognitive re-training []   Compensatory techniques for ADLs [x]  Splinting Needs []   Positioning to improve overall function [x]   Therapeutic Activity [x]  Therapeutic Exercise  [x]  Visual/Perceptual: []    Delirium prevention/treatment  []   Other:  []    Rehab Potential: Good for established goals    Patient / Family Goal: unable to state     Evaluation time includes thorough review of current medical information, gathering information on past medical & social history & PLOF, completion of standardized testing, informal observation of tasks, consultation with other medical professions/disciplines, assessment of data & development of POC/goals. Patient and/or family were instructed diagnosis, prognosis/goals and plan of care. yes Demonstrated fair understanding. Treatment Time In: 9:30            Treatment Time Out: 9:55            Treatment Charges: Mins Units   Ther Ex  92238     Manual Therapy 89872     Thera Activities 28807 25 2   ADL/Home Mgt 01781     Neuro Re-ed 67583     Group Therapy      Orthotic manage/training  76430     Non-Billable Time     Total Timed Treatment 25 2     [] Malnutrition indicators have been identified and nursing has been notified to ensure a dietitian consult is ordered. mod OT Evaluation + 25  treatment minutes    See Jackson.  [address], [address]

## 2020-07-31 NOTE — PROGRESS NOTES
Hello Dr. Duke Fothergill,    Your patient is on a medication that requires a renal dose adjustment. Renal Function Assessment:    Date Body Weight IBW Adj. Body Weight SCr CrCl Dialysis status   7/31/2020 66 kg 52.4 kg 58 kg 1.4 26 ml/min        Pharmacy has renally dose-adjusted the following medication(s):    Date Medication Original Dosing Regimen New Dosing Regimen   7/31/2020 enoxaparin 40mg daily 30mg daily           These changes were made per protocol according to the [facility] Renal Function-Based Dose Adjustments Policy    *Please note this dose may need readjusted if your patient's renal function significantly improves. Please contact pharmacy with any questions regarding these changes.

## 2020-07-31 NOTE — CARE COORDINATION
7/31, CAREY was informed by nursing that patient will not be leaving today due to consult put in on patient. CAREY informed Nilesh Kings at [facility] and Norah Montoya to patient. CAREY will continue to follow for further discharge planning. CAREY contact number is [phone].

## 2020-07-31 NOTE — CONSULTS
[address]                  [address] [place],  [facility]                                  CONSULTATION    PATIENT NAME: Jenifer Zapata                    :        1933  MED REC NO:   [id number]                            ROOM:       5214  ACCOUNT NO:   [de-identified]                           ADMIT DATE: 2020  PROVIDER:     Rosa Bach MD    CONSULT DATE:  2020    CHIEF COMPLAINT:  Pain in the lower back pain, history of fall. HISTORY OF PRESENT ILLNESS:  This 80-year-old female who apparently  sustained a fall at the facility where she resides. She was walking  with her friend and her friend tripped and then she tripped and both  fell. Complaining of severe pain in the back when she moves. She is  not a good historian. She underwent a CT scan of the lumbar spine which  raised the possibility of compression fracture of T12 with 90%  retropulsion. The patient denies any radiation of pain down the lower  extremities. The CT scan was reviewed. PAST MEDICAL HISTORY:  Breast cancer, coronary artery disease, chronic  back pain, compression fracture, hypertension, memory changes, spinal  stenosis, type 2 diabetes mellitus. PAST SURGICAL HISTORY:  Breast lumpectomy, coronary artery bypass graft,  eyes surgery, hysterectomy, and mastectomy. SOCIAL HISTORY:  Not a smoker. Does not use alcohol or street drugs. PERSONAL HISTORY:  Allergy to DARVON, PENICILLIN and PROPOXYPHENE.    PHYSICAL EXAMINATION:  GENERAL:  She is a well-developed, well-nourished female, overweight, in  no acute distress. She is alert, awake, confused. NEUROLOGIC:  Speech is fair. Memory is poor. She can count the  fingers. She has problem with the right eye. Handgrip is equal  bilaterally. She has weakness in the left lower extremity, particularly  the quadriceps and hip flexion. No motor deficit in the right lower  extremity.   She has tenderness over the thoracolumbar area and lumbar  spine. I have reviewed the CT scan of the lumbar spine. IMPRESSION:  Compression fracture T12 and multiple medical  comorbidities. I have discussed the option of TLSO brace versus a kyphoplasty with the  family for the relief of pain. The pain is about 10/10 when she moves. Family wishes to proceed with kyphoplasty. We will get the MRI scan of  the thoracic and lumbar spine and plan kyphoplasty over the weekend once  the studies are reviewed and medically cleared.         Librado Razo MD    D: 07/31/2020 16:49:50       T: 07/31/2020 16:59:10     [email or web address]  Job#: [id number]     Doc#: [id number]    CC:

## 2020-07-31 NOTE — CARE COORDINATION
7/31, Transportation has been set up with [facility] providing transport for 4:00pm  today provided patient has been medically cleared for discharge back to the [address] [facility]. Referral for C to Joe. Maureen Gamino informed of patient's recent ampac scores PT-16/24 am-75 feet and OT-15/24. Hayes Resendez in agreement for patient to return to [facility] later today. Nursing informed. Face sheet and envelope on soft chart.  to follow for any further needs.  contact number is [phone].

## 2020-07-31 NOTE — CARE COORDINATION
7/31, CAREY/LIBORIO met with patient at bedside. Patient was pleasantly confused-CAREY contacted patient's daughter-Trish (POESSENCE) on patient. Discussed discharge planning. Patient is from the [facility] unit with Carrie Cronin in agreement for patient to go to [facility] if necessary. CAREY spoke with Michelle Nagel from [facility] on patient being from [address] [place] [address]. They have bed available to [facility]-sk[facility] should patient need facility. Will wait to see PT/OT scores to continue discharge planning. CAREY contact number is [phone].

## 2020-08-01 NOTE — PROGRESS NOTES
Reviewed MRI scan of thoracic spine  1.  Chronic compression deformity of T12 with posteriorly retropulsed    fragment causes cord deviation and up to moderate spinal canal    stenosis. Additionally there is at least mild bilateral neural    foraminal stenosis at this level. 2.  Multilevel degenerative changes described in detail above. 3.  Accentuated kyphosis of the thoracic spine. Reviewed MRI scan of Lumbar spine:  1. Chronic severe anterior wedge compression deformity of the T12    vertebral body. 2. Mild marrow edema in the posterior aspect of the T12 vertebral body    is likely reactive. An acute on chronic injury cannot be excluded. 3. Retropulsion of fracture fragments at T12 results in moderate    central canal stenosis. 4. Moderate to severe central canal stenosis at L2-3.    5. Left paracentral disc protrusion at L5-S1 with impingement of the    left S1 nerve. 6. Multilevel neural foraminal stenoses, worst (moderate-to-severe) at    L5-S1. Discussed findings with family. Discussed options of Epidural Nerve Block Vs. Kyphoplasty T12. The family wishes to opt for kyphoplasty. .  Plan for later today

## 2020-08-01 NOTE — PROGRESS NOTES
received this report from nursing staff.   Patient apparently walked in the halls yesterday with physical therapy according to the sitter at bedside        Social service to assist in appropriate placement   iss for elevated blood sugars   DVT Prophylaxis   PT/OT  Discharge planning       All consultants notes reviewed    Orquidea Hamilton MD  11:31 AM  8/1/2020

## 2020-08-01 NOTE — PROGRESS NOTES
Physical Therapy    Patient is currently on PT caseload and treatment held at this time. Pt is currently off unit for MRI and is awaiting Kyphoplasty. Will continue to follow.     Edie Melgar, PT, DPT  License [id number]

## 2020-08-02 PROBLEM — S22.000A COMPRESSION FRACTURE OF BODY OF THORACIC VERTEBRA (HCC): Status: ACTIVE | Noted: 2020-01-01

## 2020-08-02 NOTE — PROGRESS NOTES
Subjective: The patient is awake and alert. No acute events overnight. Denies chest pain, angina, SOB   Daughter is at bedside and indicates patient is not able to voice complaints of pain secondary to her existing dementia however screams on movement    Objective:    /63   Pulse 57   Temp 96 °F (35.6 °C) (Temporal)   Resp 16   Ht 5' 2.99\" (1.6 m)   Wt 145 lb 8.1 oz (66 kg)   SpO2 97%   BMI 25.78 kg/m²     In: 120 [P.O.:120]  Out: -     HEENT: NCAT,  PERRLA, No JVD  Heart:  RRR, no murmurs, gallops, or rubs.   Lungs:  CTA bilaterally, no wheeze, rales or rhonchi  Abd: bowel sounds present, nontender, nondistended, no masses  Extrem:  No clubbing, cyanosis, or edema     Recent Labs     07/31/20  0601   WBC 13.0*   HGB 12.1   HCT 36.9          Recent Labs     07/31/20  0601      K 3.8      CO2 27   BUN 24*   CREATININE 1.1*   CALCIUM 9.6       Assessment:    Patient Active Problem List   Diagnosis    Spinal stenosis, lumbar region, without neurogenic claudication    Displacement of lumbar intervertebral disc without myelopathy    L1 vertebral fracture (HCC)    T12 compression fracture (HCC)    Cerebral atherosclerosis    Type 2 diabetes mellitus with stage 3 chronic kidney disease, with long-term current use of insulin (HCC)    Mixed hyperlipidemia    Hypothyroidism    Vitamin D deficiency    Benign hypertension    Coronary arteriosclerosis    Late onset Alzheimer's disease with behavioral disturbance (HCC)    Recurrent UTI    CKD (chronic kidney disease) stage 4, GFR 15-29 ml/min ([facility])    Falls frequently    Thoracic compression fracture, closed, initial encounter ([address]    Fall at home, sequela    Compression fracture of body of thoracic vertebra ([address]       Plan:       Admit to [facility] for eval of thoracic compression fracture   Pain control   Neurosurgery eval- now the plan is for kyphoplasty again-scheduled for tomorrow  leukocytosis is reactive   According to

## 2020-08-02 NOTE — PROGRESS NOTES
Call placed to Dr. Harmony Maciel regarding patient's lantus order tonight given that she will be NPO at midnight. Will await call back.

## 2020-08-02 NOTE — PROGRESS NOTES
Physical Therapy    Patient is currently on PT caseload and treatment held at this time. Pt is awaiting Kyphoplasty this date. Will continue to follow.     Violette Glover, PT, DPT  License [id number]

## 2020-08-03 NOTE — PLAN OF CARE
Problem: Falls - Risk of:  Goal: Will remain free from falls  Description: Will remain free from falls  8/2/2020 2136 by Audrey Stover RN  Outcome: Met This Shift  8/2/2020 1036 by Raul Meadows RN  Outcome: Met This Shift

## 2020-08-03 NOTE — ANESTHESIA PRE PROCEDURE
or chew. 3/2/20   Luisa Puente MD   Blood Glucose Calibration (OT ULTRA/FASTTK CNTRL SOLN) SOLN  10/22/19   Historical Provider, MD   Sonia Villanueva 30G X 8 MM MISC USE AS DIRECTED TWICE DAILY.  9/4/19   Luisa Puente MD   Cholecalciferol (VITAMIN D3) 2000 units CAPS Take 1 capsule by mouth daily 7/19/19   Historical Provider, MD   acetaminophen (TYLENOL) 325 MG tablet Take 650 mg by mouth every 6 hours as needed for Pain or Fever 11/29/18   Historical Provider, MD   Melatonin Gummies 2.5 MG CHEW Take 2 each by mouth nightly 11/29/18   Historical Provider, MD   Ascorbic Acid (VITAMIN C) 500 MG tablet Take 500 mg by mouth 2 times daily 11/29/18   Historical Provider, MD       Current medications:    Current Facility-Administered Medications   Medication Dose Route Frequency Provider Last Rate Last Dose    vancomycin 1000 mg IVPB in 250 mL D5W addavial  1,000 mg Intravenous 60 Min Pre-Op Dwaine Sullivan MD        HYDROcodone-acetaminophen [facility]) 5-325 MG per tablet 1 tablet  1 tablet Oral Q4H PRN Dwaine Sullivan MD   1 tablet at 07/31/20 1703    atorvastatin (LIPITOR) tablet 20 mg  20 mg Oral Nightly Himanshu Vela MD   20 mg at 08/02/20 2108    glucose (GLUTOSE) 40 % oral gel 15 g  15 g Oral PRN Himanshu Vela MD        dextrose 50 % IV solution  12.5 g Intravenous PRN Himanshu Vela MD        glucagon (rDNA) injection 1 mg  1 mg Intramuscular PRN Himanshu Vela MD        dextrose 5 % solution  100 mL/hr Intravenous PRN Himanshu Vela MD        insulin glargine (LANTUS) injection vial 30 Units  30 Units Subcutaneous Nightly Himanshu Vela MD   30 Units at 08/01/20 2110    insulin lispro (HUMALOG) injection vial 0-12 Units  0-12 Units Subcutaneous TID WC Himanshu Vela MD   2 Units at 08/01/20 1705    insulin lispro (HUMALOG) injection vial 0-6 Units  0-6 Units Subcutaneous Nightly Himanshu Vela MD   1 Units at 08/02/20 2107    levothyroxine (SYNTHROID) tablet 88 mcg  88 mcg Oral Daily Himanshu Vela MD   88 mcg E78.2    Hypothyroidism E03.9    Vitamin D deficiency E55.9    Benign hypertension I10    Coronary arteriosclerosis I25.10    Late onset Alzheimer's disease with behavioral disturbance ([facility]) G30.1, F02.81    Recurrent UTI N39.0    CKD (chronic kidney disease) stage 4, GFR 15-29 ml/min ([facility]) N18.4    Falls frequently R29.6    Thoracic compression fracture, closed, initial encounter ([facility] S22.000A    Fall at home, sequela W19. XXXS, Y92.009    Compression fracture of body of thoracic vertebra ([facility] S22.000A       Past Medical History:        Diagnosis Date    [facility]     RIGHT BREAST MASTECTOMY    CAD (coronary artery disease)     Chronic back pain     Displacement of lumbar intervertebral disc without myelopathy     Forgetfulness     Hypertension     Influenza vaccine administered 03/2019    Memory change     Pneumococcal vaccine administered 03/2019    Spinal stenosis, lumbar region, without neurogenic claudication     Type II or unspecified type diabetes mellitus without mention of complication, not stated as uncontrolled        Past Surgical History:        Procedure Laterality Date    BREAST LUMPECTOMY  4/14/10    CORONARY ARTERY BYPASS GRAFT  2009    QUADRUPLE BYPASS    EYE SURGERY      HYSTERECTOMY  1987    MASTECTOMY  5/6/10       Social History:    Social History     Tobacco Use    Smoking status: Never Smoker    Smokeless tobacco: Never Used   Substance Use Topics    Alcohol use:  No                                Counseling given: Not Answered      Vital Signs (Current):   Vitals:    08/02/20 0745 08/02/20 1530 08/02/20 2332 08/03/20 0745   BP: 138/63 (!) 154/71 137/87 (!) 143/87   Pulse: 57 61 80 61   Resp: 16 16 16 16   Temp: 35.6 °C (96 °F) 35.8 °C (96.5 °F) 36.7 °C (98 °F) 35.6 °C (96 °F)   TempSrc: Temporal Temporal Temporal Temporal   SpO2: 97% 98%  99%   Weight:       Height:                                                  BP Readings from Last 3 Encounters: 08/03/20 (!) 143/87   07/27/20 120/70   07/13/20 120/64       NPO Status: Time of last liquid consumption: 2300                        Time of last solid consumption: 2300                        Date of last liquid consumption: 08/02/20                        Date of last solid food consumption: 08/02/20    BMI:   Wt Readings from Last 3 Encounters:   07/31/20 145 lb 8.1 oz (66 kg)   07/27/20 145 lb 9.6 oz (66 kg)   07/13/20 140 lb (63.5 kg)     Body mass index is 25.78 kg/m². CBC:   Lab Results   Component Value Date    WBC 12.2 08/03/2020    RBC 3.96 08/03/2020    HGB 13.1 08/03/2020    HCT 39.6 08/03/2020    .0 08/03/2020    RDW 12.8 08/03/2020     08/03/2020       CMP:   Lab Results   Component Value Date     08/03/2020    K 3.7 08/03/2020    K 3.8 07/31/2020    CL 95 08/03/2020    CO2 23 08/03/2020    BUN 24 08/03/2020    CREATININE 1.1 08/03/2020    GFRAA 57 08/03/2020    LABGLOM 47 08/03/2020    GLUCOSE 137 08/03/2020    GLUCOSE 184 05/08/2012    PROT 7.3 07/13/2020    CALCIUM 9.9 08/03/2020    BILITOT 0.3 07/13/2020    ALKPHOS 171 07/13/2020    AST 12 07/13/2020    ALT 8 07/13/2020       POC Tests: No results for input(s): POCGLU, POCNA, POCK, POCCL, POCBUN, POCHEMO, POCHCT in the last 72 hours.     Coags:   Lab Results   Component Value Date    PROTIME 10.6 05/28/2015    INR 1.0 05/28/2015    APTT 24.5 05/28/2015       HCG (If Applicable): No results found for: PREGTESTUR, PREGSERUM, HCG, HCGQUANT     ABGs: No results found for: PHART, PO2ART, YLU7ZVS, LKW7DND, BEART, M4ORWNTY     Type & Screen (If Applicable):  No results found for: LABABO, LABRH    Drug/Infectious Status (If Applicable):  No results found for: HIV, HEPCAB    COVID-19 Screening (If Applicable):   Lab Results   Component Value Date    COVID19 Not Detected 07/29/2020     EKG 7/13/20  HR 66  Normal sinus rhythm  Normal ECG  When compared with ECG of 25-MAR-2017 16:00,  No significant change was found    Anesthesia Evaluation  Patient summary reviewed and Nursing notes reviewed no history of anesthetic complications:   Airway: Mallampati: I  TM distance: >3 FB   Neck ROM: full  Mouth opening: > = 3 FB Dental:    (+) partials      Pulmonary: breath sounds clear to auscultation                             Cardiovascular:    (+) hypertension:, CAD:, CABG/stent: no interval change, hyperlipidemia      ECG reviewed  Rhythm: regular  Rate: normal                    Neuro/Psych:   (+) psychiatric history:             ROS comment: Thoracic compression fracture GI/Hepatic/Renal:             Endo/Other:    (+) DiabetesType II DM, well controlled, using insulin, hypothyroidism::., malignancy/cancer (HX Right breast CA s/p mastectomy ). Abdominal:           Vascular:                                      Anesthesia Plan      general     ASA 3     (#20 Left AC)  Induction: intravenous. Anesthetic plan and risks discussed with patient. Use of blood products discussed with patient whom consented to blood products. Plan discussed with CRNA and attending.                   KADEEM Ireland - CRNA   8/3/2020

## 2020-08-03 NOTE — PLAN OF CARE
Problem: Falls - Risk of:  Goal: Will remain free from falls  Description: Will remain free from falls  8/3/2020 0954 by Lydia Plasencia RN  Outcome: Met This Shift  8/2/2020 2136 by Mustapha Jalloh RN  Outcome: Met This Shift     Problem: Falls - Risk of:  Goal: Absence of physical injury  Description: Absence of physical injury  Outcome: Met This Shift     Problem: Skin Integrity:  Goal: Will show no infection signs and symptoms  Description: Will show no infection signs and symptoms  Outcome: Met This Shift     Problem: Skin Integrity:  Goal: Absence of new skin breakdown  Description: Absence of new skin breakdown  Outcome: Met This Shift

## 2020-08-03 NOTE — PROGRESS NOTES
Date: 8/3/2020       Patient Name: Favio Cronin  : 1933      MRN: [id number]    PT order received and chart reviewed. PT held, pt scheduled for kypholasty today.    Will follow up as appropriate    Jermaine Hearn PT, DPT  [id number]

## 2020-08-03 NOTE — BRIEF OP NOTE
Brief Postoperative Note      Patient: Skye Donald  YOB: 1933  MRN: [id number]    Date of Procedure: 8/3/2020    Pre-Op Diagnosis: THORACIC PAIN    Post-Op Diagnosis: Same       Procedure(s):  KYPHOPLASTY T12, EPIDURAL INJECTION    Surgeon(s):  Jamila Jeffery MD    Assistant:Leti Romano    Anesthesia: General    Estimated Blood Loss (mL): Non    Complications: None    Specimens:   ID Type Source Tests Collected by Time Destination   A : T12 Bone Biopsy  Bone Biopsy SURGICAL PATHOLOGY Jamila Jeffery MD 8/3/2020 1334        Implants:  Implant Name Type Inv.  Item Serial No.  Lot No. LRB No. Used Action   KIT CEMENT BONE W/KYPHON MIXER TUBE [facility] [employer] KIT CEMENT BONE W/KYPHON MIXER TUBE [address] [id number] N/A 1 Implanted         Drains: * No LDAs found *    Findings: As expected    Electronically signed by Jamila Jeffery MD on 8/3/2020 at 1:52 PM

## 2020-08-03 NOTE — PROGRESS NOTES
Subjective: The patient is awake and alert. No acute events overnight. tele sitter at bedside   Pt confused     Objective:    BP (!) 143/87   Pulse 61   Temp 96 °F (35.6 °C) (Temporal)   Resp 16   Ht 5' 2.99\" (1.6 m)   Wt 145 lb 8.1 oz (66 kg)   SpO2 99%   BMI 25.78 kg/m²     In: 180 [P.O.:180]  Out: -     HEENT: NCAT,  PERRLA, No JVD  Heart:  RRR, no murmurs, gallops, or rubs.   Lungs:  CTA bilaterally, no wheeze, rales or rhonchi  Abd: bowel sounds present, nontender, nondistended, no masses  Extrem:  No clubbing, cyanosis, or edema     Recent Labs     08/03/20  0610   WBC 12.2*   HGB 13.1   HCT 39.6          Recent Labs     08/03/20  0610      K 3.7   CL 95*   CO2 23   BUN 24*   CREATININE 1.1*   CALCIUM 9.9       Assessment:    Patient Active Problem List   Diagnosis    Spinal stenosis, lumbar region, without neurogenic claudication    Displacement of lumbar intervertebral disc without myelopathy    L1 vertebral fracture (HCC)    T12 compression fracture (HCC)    Cerebral atherosclerosis    Type 2 diabetes mellitus with stage 3 chronic kidney disease, with long-term current use of insulin (HCC)    Mixed hyperlipidemia    Hypothyroidism    Vitamin D deficiency    Benign hypertension    Coronary arteriosclerosis    Late onset Alzheimer's disease with behavioral disturbance (HCC)    Recurrent UTI    CKD (chronic kidney disease) stage 4, GFR 15-29 ml/min (HCC)    Falls frequently    [facility]acic [facility] fracture, closed, [facility]    Fall at home, sequela    Compression fracture of body of thoracic vertebra ([facility]       Plan:       Admit to [facility] for eval of thoracic compression fracture   Pain control   Neurosurgery eval- now the plan is for kyphoplasty again-scheduled for today   leukocytosis is reactive - no fevers or chills   Cleared from medical standpoint to proceed with kyphoplasty with low to moderate risk of cardiovascular event      Social service to assist in appropriate placement   iss for elevated blood sugars   DVT Prophylaxis   PT/OT  Discharge planning       All consultants notes reviewed    Katharina Arce MD  11:50 AM  8/3/2020

## 2020-08-04 NOTE — PROGRESS NOTES
Physical Therapy  Facility/Department: [facility]  Daily Treatment Note  NAME: Kyler Ramirez  : 1933  MRN: [id number]    Date of Service: 2020      Patient Diagnosis(es): The primary encounter diagnosis was Fall, initial encounter. A diagnosis of Pain was also pertinent to this visit. has a past medical history of Breast cancer ([address] CAD (coronary artery disease), Chronic back pain, Displacement of lumbar intervertebral disc without myelopathy, Forgetfulness, Hypertension, Influenza vaccine administered, Memory change, Pneumococcal vaccine administered, Spinal stenosis, lumbar region, without neurogenic claudication, and Type II or unspecified type diabetes mellitus without mention of complication, not stated as uncontrolled. has a past surgical history that includes Hysterectomy (); Breast lumpectomy (4/14/10); Mastectomy (5/6/10); Coronary artery bypass graft (); eye surgery; and Kyphosis surgery (N/A, 8/3/2020). Referring Provider:  Ammon Martinez MD     Date of Service: 2020     Evaluating PT:  Lala Gong PT, DPT PT [id number]     Room #:  6464/8325-W  Diagnosis:  Stable T12 Compression Fx  PMHx/PSHx:  R Breast Cancer, CAD, HTN, DM II  Procedure/Surgery:  Balloon osteoplasty, that is, kyphoplasty, T12; bone biopsy, T12; and injection of epidural steroid.  (8/3/2020)  Precautions:  Falls, Confusion, , Spine, R Eye Visual Deficits, spinal precautions  Equipment Needs:  [employer]     SUBJECTIVE:     Pt is a questionable historian. Per chart, pt [facility]. Pt reports she ambulated with [employer] independently PTA. Equipment Owned: [employer]?     OBJECTIVE:    Initial Evaluation  Date: 2020 Treatment   Short Term/ Long Term   Goals   AM-PAC 6 Clicks 42/51  60/62     Was pt agreeable to Eval/treatment? Yes  yes     Does pt have pain? Mild pain L heel  right LE pain     Bed Mobility  Rolling: SBA  Supine to sit: Macarena  Sit to supine: NT  Scooting: SBA Rolling:   Max A  Supine to sit: Max A  Scooting: Mod A to sitting EOB  Rolling: Supervision  Supine to sit: Supervision  Sit to supine: Supervision  Scooting: Supervision   Transfers Sit to stand: Macarena  Stand to sit: Macarena  Stand pivot: Macarena [employer] Sit to stand:  Min A  Stand to sit:  Min A  Stand pivot: Mod A with w/w  Sit to stand: Supervision  Stand to sit: Supervision  Stand pivot: Supervision AAD   Ambulation    75, 10 feet with Macarena [employer]  12 feet with w/w Mod/Max A >150 feet with Supervision   Stair negotiation: ascended and descended  NT NT  Not a goal of care   ROM BUE:  See OT Note  BLE:  WFL       Strength BUE:  See OT Note  BLE:  4/5 grossly   Improve Strength 1/3 MMT Grade   Balance Sitting EOB:  SBA  Dynamic Standing:  Macarena [employer]  sitting EOB:  Min A  Standing with w/w Mod/Max A Sitting EOB:  Supervision  Dynamic Standing:  Supervision AAD        Therapeutic Exercises: Ankle pumps, LAQs and hip flexion while sitting up in the chair. Patient education  Pt educated on PT objectives during treatment session, hand placement during transfers, posture while standing, spinal precautions, w/w usage and safety. Patient response to education:   Pt verbalized understanding Pt demonstrated skill Pt requires further education in this area   Yes    yes     ASSESSMENT:    Comments:  Pt found in bed and left sitting up in the chair with call light in reach. Treatment:  Patient practiced and was instructed in the following treatment:    Functional mobility and therapeutic exercises performed as documented above. No report of dizziness during functional mobility. Cueing required for spinal precautions during bed mobility. Cueing required for hand placement during transfers. Pt with confusion throughout treatment session. Pt ambulates with slow gait speed and takes short shuffled steps. Cueing required for upright posture when standing. Pt demonstrated decreased walker safety.          PLAN:    Patient is making fair progress towards established goals. Will continue with current POC.      Time in  0850  Time out  0913    Total Treatment Time  23 minutes     CPT codes:    [x] Therapeutic activities 80493 15minutes  [x] Therapeutic exercises [address], [address]

## 2020-08-04 NOTE — ANESTHESIA POSTPROCEDURE EVALUATION
Department of Anesthesiology  Postprocedure Note    Patient: Milli Mora  MRN: [id number]  YOB: 1933  Date of evaluation: 8/3/2020  Time:  8:20 PM     Procedure Summary     Date:  08/03/20 Room / Location:  [address] [place] [address] 20 / [facility]    Anesthesia Start:  0170 Anesthesia Stop:  0363    Procedure:  KYPHOPLASTY T12, EPIDURAL INJECTION (N/A ) Diagnosis:  (THORACIC PAIN)    Surgeon:  Marshall Isbell MD Responsible Provider:  Anisha Antony MD    Anesthesia Type:  general ASA Status:  3          Anesthesia Type: general    Ajay Phase I: Ajay Score: 10    Ajay Phase II:      Last vitals: Reviewed and per EMR flowsheets.        Anesthesia Post Evaluation    Patient location during evaluation: PACU  Patient participation: complete - patient cannot participate (baseline dementia)  Level of consciousness: awake  Airway patency: patent  Nausea & Vomiting: no vomiting and no nausea  Complications: no  Cardiovascular status: hemodynamically stable  Respiratory status: acceptable  Hydration status: stable

## 2020-08-04 NOTE — PROGRESS NOTES
OT BEDSIDE TREATMENT NOTE      Date:2020  Patient Name: Wendy Bowman  MRN: [id number]  : 1933  Room: [address] [place] [address]     Per OT Eval:   Evaluating OT: Dorinda Gaspars. Duyen Serrano, OTR/L #[id number]     Referring physician: Beverley Martinez MD   AM-PAC Daily Activity Raw Score:   Recommended Adaptive Equipment: ww, shower seat, elevated commode      Diagnosis: T compression fx. T 12   Surgery:   Past Surgical History         Past Surgical History:   Procedure Laterality Date    BREAST LUMPECTOMY   4/14/10    CORONARY ARTERY BYPASS GRAFT        QUADRUPLE BYPASS    EYE SURGERY        HYSTERECTOMY   1987    MASTECTOMY   5/6/10           Pertinent Medical History:   Past Medical History        Past Medical History:   Diagnosis Date    Breast cancer ([facility] [address]       RIGHT BREAST MASTECTOMY    CAD (coronary artery disease)      Chronic back pain      Displacement of lumbar intervertebral disc without myelopathy      Forgetfulness      Hypertension      Influenza vaccine administered 2019    Memory change      Pneumococcal vaccine administered 2019    Spinal stenosis, lumbar region, without neurogenic claudication      Type II or unspecified type diabetes mellitus without mention of complication, not stated as uncontrolled             Precautions:  Falls, spinal neutral mechanics, safety- sitter, R eye impaired      Home Living: Pt lives at [facility]    Bathroom setup: handicap accessible   Equipment owned: possible walker and shower seat  Prior Level of Function:   assisted with ADLs ,  dependent with IADLs; using ? ? No AD? for ambulation.    Driving: no                           Medication Management assist  Occupation: enjoys activities at [facility]     Pain Level: Pt complained of RLE pain this session  Cognition: A&O: 1/3; Follows 1-2 step directions required repeated cues for safety              Memory:  poor              Sequencing:  fair               Problem solving:  poor              Judgement/safety:  poor Functional Assessment:    Initial Eval Status  Date: 7/31/20 Treatment Status  Date: 8/4/20 Short Term Goals=LTG  Treatment frequency: PRN 1-34 x/week   Feeding supervision  Luis  Pt sitting upright eating of breakfast meal, with pt requiring assistance to cut up foot. Pt able to hold utensil and bring food to mouth     Grooming Min A standing modA  Pt washed face seated upright in chair, requiring assistance to comb hair  supervision    UB Dressing Min A  modA  [facility] gown seated EOB  supervision   LB Dressing Max A   maxA  [facility] socks seated EOB Min A with AE prn to maintain spinal neutral mechanics    Bathing N/t simulated mod A   maxA  Simulated Task Min A with LHS prn    Toileting Mod A with increased cueing  maxA  Pt incontinent of bladder upon arrival Min A with ww and elevated commode   Bed Mobility  Supine to sit: min A    Sit to supine: n/t maxA  Supine<>EOB  Supine to sit: mod I    Sit to supine: mod I    Functional Transfers Sit to stand: min A   Stand to sit:  Min A   Stand pivot: mod A with cues for walker safety and sequencing  Luis  Sit to Stand  Stand to Sit SBA   Functional Mobility Min - mod A with ww and increased cues  mod/maxA  Pt ambulated short distance with w.w., having poor safety, max cueing, and assistance with walker management SBA with ww   Balance Sitting:     Static:  SBA    Dynamic:CGA  Standing: min A   Sitting EOB:  Luis    Standing:   Mod/maxA     Activity Tolerance Fair+ L heel pain- no back pain  Fair- good   Visual/  Perceptual Glasses: yes  Not present noted to close R eye and poor awareness of R side in toileting           Safety fair                  Fair+      Hand dominance: R   UE ROM:        RUE:  WFL                  LUE:  WFL  Strength:        RUE:  4/5        LUE:  4/5   Strength: B WFL  Fine Motor Coordination:  WFL     Hearing: WFL  Sensation: decrease in LLE otherwise No c/o numbness or tingling  Tone:  WFL  Edema: none noted Education:  Pt was educated through out treatment regarding proper technique & safety with bed mobility, functional transfers & mobility, ADL compensatory strategies to ease tasks to improve safety & prevent falls and allow pt to return home safely. Comments: Upon arrival pt supine in bed, agreeable to therapy, nurse present okaying pt to be seen this session. Pt very confused this session, requiring of max cueing to follow through and complete task, with pt having of poor safety awareness. At end of session, pt sitting upright in chair, all lines and tubes intact, call light within reach. · Pt has made limited progress towards set goals.    · Continue with current plan of care      Treatment Time In: 8:50am            Treatment Time Out: 9:13am              Treatment Charges: Mins Units   Ther Ex  51245     Manual Therapy 66227     Thera Activities 99192 15 1   ADL/Home Mgt 54223 8 1   Neuro Re-ed 32564     Group Therapy      Orthotic manage/training  37709     Non-Billable Time     Total Timed Treatment 23 2        Landy Ellington CAI/L [id number]

## 2020-08-04 NOTE — PLAN OF CARE
Problem: Neurological  Goal: Maximum potential motor/sensory/cognitive function  Outcome: Met This Shift     Problem: Falls - Risk of:  Goal: Will remain free from falls  Description: Will remain free from falls  8/4/2020 1022 by Roberto Sampson RN  Outcome: Met This Shift  8/3/2020 2355 by Levi Santiago RN  Outcome: Met This Shift  Goal: Absence of physical injury  Description: Absence of physical injury  8/4/2020 1022 by Roberto Sampson RN  Outcome: Met This Shift  8/3/2020 2355 by Levi Santiago RN  Outcome: Met This Shift     Problem: Skin Integrity:  Goal: Will show no infection signs and symptoms  Description: Will show no infection signs and symptoms  Outcome: Met This Shift  Goal: Absence of new skin breakdown  Description: Absence of new skin breakdown  8/4/2020 1022 by Roberto Sampson RN  Outcome: Met This Shift  8/3/2020 2355 by Levi Santiago RN  Outcome: Met This Shift

## 2020-08-04 NOTE — DISCHARGE SUMMARY
Physician Discharge Summary     Patient ID:  Linda Friday  [id number]  89 y.o.  1933    Admit date: 2020    Discharge date and time: 2020    Admission Diagnoses:   Patient Active Problem List   Diagnosis    Spinal stenosis, lumbar region, without neurogenic claudication    Displacement of lumbar intervertebral disc without myelopathy    L1 vertebral fracture (HCC)    T12 compression fracture (HCC)    Cerebral atherosclerosis    Type 2 diabetes mellitus with stage 3 chronic kidney disease, with long-term current use of insulin (HCC)    Mixed hyperlipidemia    Hypothyroidism    Vitamin D deficiency    Benign hypertension    Coronary arteriosclerosis    Late onset Alzheimer's disease with behavioral disturbance (HCC)    Recurrent UTI    CKD (chronic kidney disease) stage 4, GFR 15-29 ml/min ([facility])    Falls frequently    Thoracic compression fracture, closed, initial encounter ([facility] [address]    Fall at home, sequela    Compression fracture of body of thoracic vertebra [facility])       Discharge Diagnoses: Thoracic compression fractures    Consults: Neurosurgery    Procedures: KYPHOPLASTY T12, EPIDURAL INJECTION on 8/3/2020    Hospital Course:Admit to [facility] for eval of thoracic compression fracture   Pain control   Neurosurgery eval- procedures as above  leukocytosis is reactive - no fevers or chills   Cleared from medical standpoint to proceed with kyphoplasty with low to moderate risk of cardiovascular event        Social service to assist in appropriate placement   iss for elevated blood sugars      Patient tolerated inpatient stay well    No results for input(s): WBC, HGB, HCT, PLT in the last 72 hours. No results for input(s): NA, K, CL, CO2, BUN, CREATININE, CALCIUM in the last 72 hours.     Invalid input(s): GLU    Xr Hip Left (2-3 Views)    Result Date: 2020  Patient MRN:  [id number] : 1933 Age: 80 years Gender: Female Order Date:  2020 1:45 PM EXAM: XR HIP LEFT (2-3 VIEWS) COMPARISON: None INDICATION:  fall, pain, r/o fx fall, pain, r/o fx FINDINGS: No fracture or dislocation of left hip. There is significant narrowing of femoral acetabular joint with subchondral sclerosis. No fracture or dislocation of left hip. Xr Femur Left (min 2 Views)    Result Date: 2020  Patient MRN:  [id number] : 1933 Age: 80 years Gender: Female Order Date:  2020 1:45 PM EXAM: XR FEMUR LEFT (MIN 2 VIEWS) COMPARISON: None INDICATION:  fall, r/o fx fall, r/o fx FINDINGS: There is no fracture or dislocation of the left hip. There is severe narrowing of the femoral acetabular joint with subchondral sclerosis. No evidence of fracture involving mid or distal left femur. No fracture or dislocation of the left femur. Xr Knee Left (min 4 Views)    Result Date: 2020  Patient MRN:  [id number] : 1933 Age: 80 years Gender: Female Order Date:  2020 1:45 PM EXAM: XR KNEE LEFT (MIN 4 VIEWS) INDICATION:  trauma trauma COMPARISON: None FINDINGS:  There is a moderate amount of vascular calcification. There are no fractures or dislocations. There is no compartmental narrowing. There is no joint effusion. No acute process     Ct Head Wo Contrast    Result Date: 2020  Patient MRN:  [id number] : 1933 Age: 80 years Gender: Female Order Date:  2020 1:30 PM EXAM: CT HEAD WO CONTRAST COMPARISON: 2020 INDICATION:  trauma trauma TECHNIQUE: Axial unenhanced CT scanning was performed through the head without the use of intravenous contrast. Low-dose CT acquisition technique included one of the following options; 1. Automated exposure control, 2. Adjustment of mA and/or kV according to the patient's size or 3. Use of iterative reconstruction. FINDINGS: No evidence of acute intracranial hemorrhage or edema. Ventricles are nonenlarged. No abnormal extra-axial fluid collections. Mastoid air cells are clear. No evidence of acute intracranial hemorrhage or edema. Ct Cervical Spine Wo Contrast    Result Date: 2020  Patient MRN:  [id number] : 1933 Age: 80 years Gender: Female Order Date:  2020 1:30 PM EXAM: CT CERVICAL SPINE WO CONTRAST COMPARISON: May 28, 2015 INDICATION:  trauma trauma TECHNIQUE: Axial unenhanced CT scanning was performed through the cervical spine. Reformatted coronal and sagittal views also obtained. Low-dose CT acquisition technique included one of the following options; 1. Automated exposure control, 2. Adjustment of mA and/or kV according to the patient's size or 3. Use of iterative reconstruction. FINDINGS: There is no evidence of fracture or dislocation. Vertebral body height is well-maintained. No obvious prevertebral soft tissue edema. Degenerative endplate changes are present at multiple levels with significant subchondral sclerosis and marginal osteophytosis. Posterior disc/osteophyte complex at multiple levels results in mild to moderate effacement of ventral thecal sac. No evidence of fracture or dislocation of cervical spine. Ct Thoracic Spine Wo Contrast    Result Date: 2020  Patient MRN:  [id number] : 1933 Age: 80 years Gender: Female Order Date:  2020 1:45 PM EXAM: CT THORACIC SPINE WO CONTRAST COMPARISON: Correlation made with MRI of the thoracic spine from May 28, 2015 INDICATION:  trauma trauma , thoracic spine trauma TECHNIQUE: Axial unenhanced CT scanning was performed through the thoracic spine. Reformatted coronal and sagittal views also obtained. Low-dose CT acquisition technique included one of the following options; 1. Automated exposure control, 2. Adjustment of mA and/or kV according to the patient's size or 3. Use of iterative reconstruction. FINDINGS: There is redemonstration of anterior wedge compression fracture of T12 of approximately 90% with significant retropulsion. This finding appears similar compared to the previous examination.  No evidence of acute thoracic spine compression fracture. 1. No acute fracture of thoracic spine. 2. Stable compression fracture of 90% involving T12 vertebral body with retropulsion and central canal stenosis. Ct Lumbar Spine Wo Contrast    Result Date: 2020  Patient MRN:  [id number] : 1933 Age: 80 years Gender: Female Order Date:  2020 1:30 PM EXAM: CT LUMBAR SPINE WO CONTRAST COMPARISON: Correlation made with MRI of the lumbar spine from May 28, 2015 INDICATION:  back pain, r/o fx back pain, r/o fx FINDINGS: There is redemonstration of a compression fracture of approximately 90% involving T12 vertebral body with retropulsion. No acute fracture involving lumbar spine. 1. No acute fracture or lumbar spine. 2. Stable compression fracture involving T12 vertebral body of approximately 90% with retropulsion. Mri Lumbar Spine Wo Contrast    Result Date: 2020  Patient MRN: [id number] : 1933 Age:  80 years Gender: Female Order Date: 2020 12:45 PM Exam: MRI LUMBAR SPINE WO CONTRAST Indication:   t 12 compression  fx t 12 compression  fx Comparison: CT of the lumbar spine, 2020 FINDINGS: There is a severe chronic anterior wedge compression deformity of the T12 vertebral body with approximately 90% loss of height. Mild marrow edema along the posterior aspect of the body may be reactive. Acute on chronic injury cannot be excluded. The remainder of the vertebral bodies are unremarkable. There is retropulsion of fracture fragments at the level of the chronic T12 fracture, resulting in moderate central canal stenosis at the level of the superior endplate. The AP diameter of the canal at that level measures approximately 7 mm. T12-L1: Minimal disc bulge. No significant central canal, lateral recess or neural foraminal stenoses. L1-2: Small disc bulge. Mild facet and ligamentum flavum hypertrophy. Our scribe no significant L2-3: Mild loss of disc height with small disc bulge.  Mild facet and moderate ligamentum acetaminophen 325 MG tablet  Commonly known as:  TYLENOL     Allevyn Adhesive Pads  Apply 1 each topically every 3 days And prn     atorvastatin 20 MG tablet  Commonly known as:  LIPITOR  TAKE 1 TABLET BY MOUTH DAILY AT BEDTIME     GlucaGen HypoKit 1 MG Solr injection  Generic drug:  glucagon (rDNA)  USE AS DIRECTED 1MG AS NEEDED     Glutose 15 40 % Gel  Generic drug:  glucose  USE 1 TUBE AS NEEDED     HumaLOG KwikPen 100 UNIT/ML Sopn  Generic drug:  insulin lispro (1 Unit Dial)     HumaLOG Mix 75/25 KwikPen (75-25) 100 UNIT per ML Supn injection pen  Generic drug:  insulin lispro protamine & lispro  Inject 50 u SC q AM and 25 u SC q PM with dinner     Lantus SoloStar 100 UNIT/ML injection pen  Generic drug:  insulin glargine  INJECT 45 UNITS SUB-Q DAILY **DISCARD 28 DAYS AFTER OPENING*     levothyroxine 88 MCG tablet  Commonly known as:  SYNTHROID  TAKE ONE(1) TABLET BY MOUTH ONCE DAILY **CYC     lisinopril 2.5 MG tablet  Commonly known as:  PRINIVIL;ZESTRIL  TAKE ONE(1) TABLET BY MOUTH ONCE DAILY     Melatonin Gummies 2.5 MG Chew     memantine 10 MG tablet  Commonly known as:  NAMENDA  TAKE 1 TABLET BY MOUTH TWICE DAILY     metoprolol succinate 50 MG extended release tablet  Commonly known as:  TOPROL XL  Take 1 tablet by mouth daily Do not crush or chew. NovoFine Autocover 30G X 8 MM Misc  Generic drug:  Insulin Pen Needle  USE AS DIRECTED TWICE DAILY. OT ULTRA/FASTTK CNTRL SOLN Soln     torsemide 5 MG tablet  Commonly known as:  DEMADEX  Take 1 tablet by mouth daily (Give 1 tablet twice daily until swelling resolved)     vitamin C 500 MG tablet  Commonly known as:  ASCORBIC ACID     Vitamin D3 50 MCG (2000 UT) Caps          Activity: activity as tolerated  Diet: diabetic diet    Pt has been advised to: Follow-up with Kenyon Ravi MD in 1 week.   Follow-up with consultants as recommended by them    Note that over 30 minutes was spent in preparing discharge papers, discussing discharge with patient, medication review, etc.    Signed:  Vinh Simental MD  9/8/2020  11:50 AM

## 2020-08-04 NOTE — PLAN OF CARE
Problem: Falls - Risk of:  Goal: Will remain free from falls  Description: Will remain free from falls  Outcome: Met This Shift     Problem: Falls - Risk of:  Goal: Absence of physical injury  Description: Absence of physical injury  Outcome: Met This Shift     Problem: Skin Integrity:  Goal: Absence of new skin breakdown  Description: Absence of new skin breakdown  Outcome: Met This Shift

## 2020-08-04 NOTE — PROGRESS NOTES
PO#1  Kyphoplasty. Doing much better  Comfortable. The patient may be discharged from neurosurgical standpoint. Follow up in the office in 3-4 weeks.  [phone]

## 2020-08-05 NOTE — PROGRESS NOTES
OT BEDSIDE TREATMENT NOTE      Date:2020  Patient Name: Win Prescott  MRN: [id number]  : 1933  Room: [address] [place] [address]     Per OT Eval:   Evaluating OT: Noa Hart. Derek Salguero OTR/L #[id number]     Referring physician: Herrera Martinez MD   AM-PAC Daily Activity Raw Score:   Recommended Adaptive Equipment: ww, shower seat, elevated commode      Diagnosis: T compression fx. T 12   Surgery:   Past Surgical History         Past Surgical History:   Procedure Laterality Date    BREAST LUMPECTOMY   4/14/10    CORONARY ARTERY BYPASS GRAFT        QUADRUPLE BYPASS    EYE SURGERY        HYSTERECTOMY   1987    MASTECTOMY   5/6/10           Pertinent Medical History:   Past Medical History        Past Medical History:   Diagnosis Date    Breast cancer ([facility] [address]       RIGHT BREAST MASTECTOMY    CAD (coronary artery disease)      Chronic back pain      Displacement of lumbar intervertebral disc without myelopathy      Forgetfulness      Hypertension      Influenza vaccine administered 2019    Memory change      Pneumococcal vaccine administered 2019    Spinal stenosis, lumbar region, without neurogenic claudication      Type II or unspecified type diabetes mellitus without mention of complication, not stated as uncontrolled             Precautions:  Falls, spinal neutral mechanics, safety- sitter, R eye impaired      Home Living: Pt lives at [facility]    Bathroom setup: handicap accessible   Equipment owned: possible walker and shower seat  Prior Level of Function:   assisted with ADLs ,  dependent with IADLs; using ? ? No AD? for ambulation.    Driving: no                           Medication Management assist  Occupation: enjoys activities at [facility]     Pain Level: Pt did not complain of pain this session  Cognition: A&O: 1/3; Follows 1-2 step directions required repeated cues for safety              Memory:  poor              Sequencing:  fair               Problem solving:  poor              Judgement/safety:  poor Functional Assessment:    Initial Eval Status  Date: 7/31/20 Treatment Status  Date: 8/5/20 Short Term Goals=LTG  Treatment frequency: PRN 1-34 x/week   Feeding supervision  DNT  Luis per previous level     Grooming Min A standing Luis  Pt washed face, combed hair seated upright in bed supervision    UB Dressing Min A  modA  [facility] hospital gown seated EOB  supervision   LB Dressing Max A   maxA  [facility] hospital socks seated EOB Min A with AE prn to maintain spinal neutral mechanics    Bathing N/t simulated mod A   maxA  Simulated Task Min A with LHS prn    Toileting Mod A with increased cueing  maxA  Pt incontinent of bladder upon arrival Min A with ww and elevated commode   Bed Mobility  Supine to sit: min A    Sit to supine: n/t modAx2  Supine<>EOB  Supine to sit: mod I    Sit to supine: mod I    Functional Transfers Sit to stand: min A   Stand to sit:  Min A   Stand pivot: mod A with cues for walker safety and sequencing  Luis  Sit to Stand  Stand to Sit SBA   Functional Mobility Min - mod A with ww and increased cues  modA  Pt ambulated short distance with w.w., having poor safety, max cueing, and assistance with walker management SBA with ww   Balance Sitting:     Static:  SBA    Dynamic:CGA  Standing: min A   Sitting EOB:  Luis    Standing:  modA     Activity Tolerance Fair+ L heel pain- no back pain  Fair- good   Visual/  Perceptual Glasses: yes  Not present noted to close R eye and poor awareness of R side in toileting           Safety fair                  Fair+      Hand dominance: R   UE ROM:        RUE:  WFL                  LUE:  WFL  Strength:        RUE:  4/5        LUE:  4/5   Strength: B WFL  Fine Motor Coordination:  WFL     Hearing: WFL  Sensation: decrease in LLE otherwise No c/o numbness or tingling  Tone:  WFL  Edema: none noted         Education:  Pt was educated through out treatment regarding proper technique & safety with bed mobility, functional transfers & mobility, ADL compensatory strategies to ease tasks to improve safety & prevent falls and allow pt to return home safely. Comments: Upon arrival pt supine in bed, agreeable to therapy. Pt being of more alert this session, requiring of mod cueing to complete of tasks and follow through with safety. At end of session, pt sitting upright in chair, all lines and tubes intact, call light within reach. · Pt has made limited progress towards set goals.    · Continue with current plan of care      Treatment Time In: 11:03am            Treatment Time Out: 11:27am              Treatment Charges: Mins Units   Ther Ex  02643     Manual Therapy 79600     Thera Activities 49882 15 1   ADL/Home Mgt [id number] 8 1   Neuro Re-ed 21242     Group Therapy      Orthotic manage/training  06358     Non-Billable Time     Total Timed Treatment 23 2        Landy CAI/L [id number]

## 2020-08-05 NOTE — CARE COORDINATION
8/5,  SW spoke with Nayeli Sales from [facility] on precert has been obtained. Transportation has been set up with [employer] providing transportation for a 12:30pm  provided patient has been medically cleared for discharge. Those informed:  Nurse-Karma, patient was resting-daughter Beti [employer] [facility]) was contacted and informed. HENS, face sheet and envelope on soft chart. SW to follow for any further needs.     Herminio Webb, [address] [facility]

## 2020-08-05 NOTE — PROGRESS NOTES
Physical Therapy  Facility/Department: [address] NEURO SPINE  Daily Treatment Note  NAME: Juanita River  : 1933  MRN: [id number]    Date of Service: 2020      Patient Diagnosis(es): The primary encounter diagnosis was Fall, initial encounter. A diagnosis of Pain was also pertinent to this visit. has a past medical history of Breast cancer ([facility] [address] CAD (coronary artery disease), Chronic back pain, Displacement of lumbar intervertebral disc without myelopathy, Forgetfulness, Hypertension, Influenza vaccine administered, Memory change, Pneumococcal vaccine administered, Spinal stenosis, lumbar region, without neurogenic claudication, and Type II or unspecified type diabetes mellitus without mention of complication, not stated as uncontrolled. has a past surgical history that includes Hysterectomy (); Breast lumpectomy (4/14/10); Mastectomy (5/6/10); Coronary artery bypass graft (); eye surgery; and Kyphosis surgery (N/A, 8/3/2020). Referring Provider:  Juni Singh MD     Date of Service: 2020     Evaluating PT:  Wendy Pretty PT, DPT PT [id number]     Room #:  3731/6120-D  Diagnosis:  Stable T12 Compression Fx  PMHx/PSHx:  R Breast Cancer, CAD, HTN, DM II  Procedure/Surgery:  Balloon osteoplasty, that is, kyphoplasty, T12; bone biopsy, T12; and injection of epidural steroid.  (8/3/2020)  Precautions:  Falls, Confusion, , Spine, R Eye Visual Deficits, spinal precautions  Equipment Needs:  [employer]     SUBJECTIVE:     Pt is a questionable historian. Per chart, pt from [facility]. Pt reports she ambulated with [employer] independently PTA. Equipment Owned: [employer]?     OBJECTIVE:    Initial Evaluation  Date: 2020 Treatment  20 Short Term/ Long Term   Goals   AM-PAC 6 Clicks   80/98     Was pt agreeable to Eval/treatment? Yes  Yes     Does pt have pain?  Mild pain L heel  No c/o     Bed Mobility  Rolling: SBA  Supine to sit: Macarena  Sit to supine: NT  Scooting: SBA Rolling: ModA  Supine to sit: ModA x2  Sit to Supine: NT  Scooting:  Macarena  Rolling: Supervision  Supine to sit: Supervision  Sit to supine: Supervision  Scooting: Supervision   Transfers Sit to stand: Macarena  Stand to sit: Macarena  Stand pivot: Macarena [employer] Sit to stand:  [employer]  Stand to sit:  Macarena  Stand pivot: Mod A with w/w  Sit to stand: Supervision  Stand to sit: Supervision  Stand pivot: Supervision AAD   Ambulation    75, 10 feet with Macarena [employer]  20 feet with [employer] ModA >150 feet with Supervision   Stair negotiation: ascended and descended  NT NT  Not a goal of care   ROM BUE:  See OT Note  BLE:  WFL       Strength BUE:  See OT Note  BLE:  4/5 grossly   Improve Strength 1/3 MMT Grade   Balance Sitting EOB:  SBA  Dynamic Standing:  Macarena [employer]  Sitting EOB:  Min A  Standing: w/w Macarena Sitting EOB:  Supervision  Dynamic Standing:  Supervision AAD        Therapeutic Exercises:  None    Patient education  Pt educated on hand placement during transfers, spinal precautions, and [employer] sequencing. Patient response to education:   Pt verbalized understanding Pt demonstrated skill Pt requires further education in this area   Yes   No yes     ASSESSMENT:    Comments:  Pt found in bed upon tx. Pt was able to sit up with assistance and scooted to EOB and needed increased time for activities. Pt mask donned and ambulated to hallway. Pt needed many cues for proper foot sequence and ww placement but could not follow. Once pt in hallway, noticed mask had risen over eyes. It was pulled down and pt stated she needed to pull mask up over eyes. Pt corrected and told she can't see where she is going if mask is over eyes. Pt easily distracted and had difficulty focusing on ambulation once pt saw people and lights in the hallway. Pt turned around and ambulated to bedside chair, positioned, given activity apron and call light.          Treatment:  Patient practiced and was instructed in the following treatment:    Functional mobility and therapeutic exercises

## 2020-09-09 PROBLEM — I95.9 HYPOTENSION: Status: ACTIVE | Noted: 2020-01-01

## 2020-09-09 PROBLEM — Z86.79 HISTORY OF HYPERTENSION: Status: ACTIVE | Noted: 2019-07-03

## 2020-09-09 PROBLEM — R65.21 SEPTIC SHOCK (HCC): Status: ACTIVE | Noted: 2020-01-01

## 2020-09-09 PROBLEM — N39.0 UTI (URINARY TRACT INFECTION): Status: ACTIVE | Noted: 2020-01-01

## 2020-09-09 PROBLEM — A41.9 SEPTIC SHOCK (HCC): Status: ACTIVE | Noted: 2020-01-01

## 2020-09-09 NOTE — ED NOTES
Bed: 22  Expected date:   Expected time:   Means of arrival:   Comments:  [address] [facility]  09/09/20 3661

## 2020-09-09 NOTE — ED PROVIDER NOTES
HPI:  9/9/20,   Time: 2:51 PM EDT       Daniel Amin is a 80 y.o. female presenting to the ED for ams/hypotensive, beginning 2 days ago. The complaint has been persistent, severe in severity, and worsened by nothing. From nh dec loc, hypotension. Limited hx from pt. Pt bp 60/40 by ems. unk fever, unk other sx    Review of Systems:   Pertinent positives and negatives are stated within HPI, all other systems reviewed and are negative.          --------------------------------------------- PAST HISTORY ---------------------------------------------  Past Medical History:  has a past medical history of Breast cancer ([facility] [address] CAD (coronary artery disease), Chronic back pain, Displacement of lumbar intervertebral disc without myelopathy, Forgetfulness, Hypertension, Influenza vaccine administered, Memory change, Pneumococcal vaccine administered, Spinal stenosis, lumbar region, without neurogenic claudication, and Type II or unspecified type diabetes mellitus without mention of complication, not stated as uncontrolled. Past Surgical History:  has a past surgical history that includes Hysterectomy (1987); Breast lumpectomy (4/14/10); Mastectomy (5/6/10); Coronary artery bypass graft (2009); eye surgery; and Kyphosis surgery (N/A, 8/3/2020). Social History:  reports that she has never smoked. She has never used smokeless tobacco. She reports that she does not drink alcohol or use drugs. Family History: family history includes Cancer in her father; High Blood Pressure in her mother; Kidney Disease in her mother. The patients home medications have been reviewed.     Allergies: Darvon [propoxyphene hcl]; Pcn [penicillins]; and Propoxyphene        ---------------------------------------------------PHYSICAL EXAM--------------------------------------    Constitutional/General: Alert and oriented x1,pale, in distress  Head: Normocephalic and atraumatic  Eyes: PERRL, EOMI, conjunctive normal, sclera non icteric  Mouth: Oropharynx clear, handling secretions, no trismus, no asymmetry of the posterior oropharynx or uvular edema  Neck: Supple, full ROM, non tender to palpation in the midline, no stridor, no crepitus, no meningeal signs  Respiratory: Lungs clear to auscultation bilaterally, no wheezes, rales, or rhonchi. Not in respiratory distress  Cardiovascular:  Regular rate. Regular rhythm. No murmurs, gallops, or rubs. 2+ distal pulses  Chest: No chest wall tenderness  GI:  Abdomen Soft, Non tender, Non distended. +BS. No organomegaly, no palpable masses,  No rebound, guarding, or rigidity. Musculoskeletal: Moves all extremities x 4. Warm and well perfused, no clubbing, cyanosis, or edema. Capillary refill <3 seconds  Integument: skin warm and dry. No rashes. Lymphatic: no lymphadenopathy noted  Neurologic: GCS 14, no focal deficits, symmetric strength 5/5 in the upper and lower extremities bilaterally  Psychiatric: colorful Affect    -------------------------------------------------- RESULTS -------------------------------------------------  I have personally reviewed all laboratory and imaging results for this patient. Results are listed below.      LABS:  Results for orders placed or performed during the hospital encounter of 09/09/20   CBC auto differential   Result Value Ref Range    WBC 18.7 (H) 4.5 - 11.5 E9/L    RBC 4.19 3.50 - 5.50 E12/L    Hemoglobin 13.6 11.5 - 15.5 g/dL    Hematocrit 42.0 34.0 - 48.0 %    .2 (H) 80.0 - 99.9 fL    MCH 32.5 26.0 - 35.0 pg    MCHC 32.4 32.0 - 34.5 %    RDW 13.7 11.5 - 15.0 fL    Platelets 679 783 - 700 E9/L    MPV 10.4 7.0 - 12.0 fL    Neutrophils % 87.0 (H) 43.0 - 80.0 %    Lymphocytes % 7.8 (L) 20.0 - 42.0 %    Monocytes % 3.5 2.0 - 12.0 %    Eosinophils % 0.1 0.0 - 6.0 %    Basophils % 0.6 0.0 - 2.0 %    Neutrophils Absolute 16.64 (H) 1.80 - 7.30 E9/L    Lymphocytes Absolute 1.50 1.50 - 4.00 E9/L    Monocytes Absolute 0.75 0.10 - 0.95 E9/L    Eosinophils Absolute 0.00 (L) 0.05 - 0.50 E9/L    Basophils Absolute 0.00 0.00 - 0.20 E9/L    Metamyelocytes Relative 0.9 0.0 - 1.0 %    Myelocyte Percent 0.9 0 - 0 %    Poikilocytes 2+     Augustina Cells 2+     Ovalocytes 1+     Tear Drop Cells 1+    Comprehensive Metabolic Panel w/ Reflex to MG   Result Value Ref Range    Sodium 145 132 - 146 mmol/L    Potassium reflex Magnesium 5.1 (H) 3.5 - 5.0 mmol/L    Chloride 105 98 - 107 mmol/L    CO2 16 (L) 22 - 29 mmol/L    Anion Gap 24 (H) 7 - 16 mmol/L    Glucose 187 (H) 74 - 99 mg/dL     (H) 8 - 23 mg/dL    CREATININE 6.6 (H) 0.5 - 1.0 mg/dL    GFR Non-African American 6 >=60 mL/min/1.73    GFR African American 7     Calcium 9.9 8.6 - 10.2 mg/dL    Total Protein 6.6 6.4 - 8.3 g/dL    Alb 3.6 3.5 - 5.2 g/dL    Total Bilirubin 0.3 0.0 - 1.2 mg/dL    Alkaline Phosphatase 136 (H) 35 - 104 U/L    ALT 22 0 - 32 U/L    AST 28 0 - 31 U/L   Urinalysis   Result Value Ref Range    Color, UA Yellow Straw/Yellow    Clarity, UA TURBID (A) Clear    Glucose, Ur Negative Negative mg/dL    Bilirubin Urine Negative Negative    Ketones, Urine Negative Negative mg/dL    Specific Gravity, UA 1.020 1.005 - 1.030    Blood, Urine LARGE (A) Negative    pH, UA 5.0 5.0 - 9.0    Protein, UA 30 (A) Negative mg/dL    Urobilinogen, Urine 0.2 <2.0 E.U./dL    Nitrite, Urine Negative Negative    Leukocyte Esterase, Urine LARGE (A) Negative   Lactate, Sepsis   Result Value Ref Range    Lactic Acid, Sepsis 4.8 (HH) 0.5 - 1.9 mmol/L   Lactate, Sepsis   Result Value Ref Range    Lactic Acid, Sepsis 2.0 (H) 0.5 - 1.9 mmol/L   APTT   Result Value Ref Range    aPTT 20.4 (L) 24.5 - 35.1 sec   Protime-INR   Result Value Ref Range    Protime 11.6 9.3 - 12.4 sec    INR 1.0    Brain Natriuretic Peptide   Result Value Ref Range    Pro-BNP 2,517 (H) 0 - 450 pg/mL   Troponin   Result Value Ref Range    Troponin 0.24 (H) 0.00 - 0.03 ng/mL   COVID-19   Result Value Ref Range    SARS-CoV-2, NAAT Not Detected Not Detected Microscopic Urinalysis   Result Value Ref Range    WBC, UA PACKED (A) 0 - 5 /HPF    RBC, UA >20 0 - 2 /HPF    Bacteria, UA MODERATE (A) None Seen /HPF   EKG 12 lead   Result Value Ref Range    Ventricular Rate 69 BPM    Atrial Rate 69 BPM    P-R Interval 132 ms    QRS Duration 78 ms    Q-T Interval 478 ms    QTc Calculation (Bazett) 512 ms    P Axis 66 degrees    R Axis 8 degrees    T Axis 31 degrees   TYPE AND SCREEN   Result Value Ref Range    ABO/Rh B POS     Antibody Screen NEG        RADIOLOGY:  Interpreted by Radiologist.  CT head without contrast   Final Result   Atrophy and likely chronic microvascular ischemic disease. XR CHEST PORTABLE   Final Result   No acute cardiopulmonary abnormality. EKG: This EKG is signed and interpreted by the EP. Time: 1450  Rate: 70  Rhythm: Sinus  Interpretation: non-specific EKG  Comparison: None      ------------------------- NURSING NOTES AND VITALS REVIEWED ---------------------------   The nursing notes within the ED encounter and vital signs as below have been reviewed by myself. BP (!) 85/44   Pulse 80   Temp 98.7 °F (37.1 °C) (Oral)   Resp 17   Wt 140 lb (63.5 kg)   SpO2 100%   BMI 24.81 kg/m²   Oxygen Saturation Interpretation: Normal    The patients available past medical records and past encounters were reviewed. ------------------------------ ED COURSE/MEDICAL DECISION MAKING----------------------  Medications   cefepime (MAXIPIME) 2 g IVPB extended (mini-bag) (2 g Intravenous New Bag 9/9/20 1931)   0.9 % sodium chloride bolus (has no administration in time range)   0.9 % sodium chloride infusion (has no administration in time range)   0.9 % sodium chloride IV bolus 1,905 mL (0 mLs Intravenous Stopped 9/9/20 1657)         ED COURSE:       Medical Decision Making:    Pt clinically septic, bp improved with initial bolus, w/u ntoed, arf, and elev lactic, abx given, ua source. Pt family updated, agreeable with poc.   Discussed neomed, will admit. As pt bp lowered after ivf, will admit to icu      This patient's ED course included: a personal history and physicial examination    This patient has remained hemodynamically stable during their ED course. Re-Evaluations:             Re-evaluation. Patients symptoms show no change    Re-examination  9/9/20   2:51 PM EDT          Vital Signs:   Vitals:    09/09/20 1733 09/09/20 1745 09/1934 09/09/20 1939   BP: (!) 105/58 (!) 105/49  (!) 85/44   Pulse: 75 69 80    Resp: 18 19 17    Temp:       TempSrc:       SpO2: 100%  100%    Weight:         Card/Pulm:  Rhythm: normal rate. Heart Sounds: no murmurs, gallops, or rubs. clear to auscultation, no wheezes or rales and unlabored breathing. Capillary Refill: normal.  Radial Pulse:  equal.  Skin:  Warm. Consultations:             Critical care  neomed    Critical Care: 40 min        Counseling: The emergency provider has spoken with the family member patient and daughter and discussed todays results, in addition to providing specific details for the plan of care and counseling regarding the diagnosis and prognosis. Questions are answered at this time and they are agreeable with the plan.       --------------------------------- IMPRESSION AND DISPOSITION ---------------------------------    IMPRESSION  1. Altered mental status, unspecified altered mental status type    2. Sepsis, due to unspecified organism, unspecified whether acute organ dysfunction present ([facility] [address]    3. Acute cystitis with hematuria    4. ARF      DISPOSITION  Disposition: Admit to CCU/ICU  Patient condition is serious    NOTE: This report was transcribed using voice recognition software.  Every effort was made to ensure accuracy; however, inadvertent computerized transcription errors may be present        Rodríguze Garnica MD  09/09/20 2033

## 2020-09-10 NOTE — FLOWSHEET NOTE
Inpatient Wound Care(initial evaluation)  4430    Admit Date: 9/9/2020  2:41 PM    Reason for consult:  Coccyx, heels, legs    Significant history:    Per ED note: Presenting to the ED for ams/hypotensive, beginning 2 days ago; from NH dec loc, hypotension. Pt BP 60/40 by ems.      Wound history:  Admitted with wounds    Findings:     09/10/20 1330   Skin Integrity   Skin Integrity Bruising; Redness  (dried scabs)   Location BUE   Skin Integrity Site 2   Skin Integrity Location 2   (erosion)   Location 2 gluteal folds, medial thighs, groins   Wound 09/10/20 Heel Right   Date First Assessed/Time First Assessed: 09/10/20 0100   Present on Hospital Admission: Yes  Location: Heel  Wound Location Orientation: Right   Wound Image    Wound Deep tissue/Injury   Dressing/Treatment Foam   Wound Length (cm) 1 cm   Wound Width (cm) 1 cm   Wound Depth (cm)   (unable to determine)   Wound Surface Area (cm^2) 1 cm^2   Change in Wound Size % (l*w) 83.33   Wound Assessment Purple   Drainage Amount None   Farzaneh-wound Assessment Blanchable erythema   Purple%Wound Bed 100   Wound 09/10/20 Leg Right;Posterior; Lower   Date First Assessed/Time First Assessed: 09/10/20 0100   Present on Hospital Admission: Yes  Location: Leg  Wound Location Orientation: Right;Posterior; Lower   Wound Image    Dressing/Treatment Open to air   Wound Length (cm) 1.4 cm   Wound Width (cm) 5 cm   Wound Depth (cm) 0.1 cm   Wound Surface Area (cm^2) 7 cm^2   Change in Wound Size % (l*w) -133.33   Wound Volume (cm^3) 0.7 cm^3   Wound Healing % -133   Wound Assessment Purple   Drainage Amount None   Farzaneh-wound Assessment Intact   Purple%Wound Bed 100   Wound 08/01/20 Coccyx   Date First Assessed/Time First Assessed: 08/01/20 1351   Location: Coccyx   Wound Image    Wound Deep tissue/Injury   Dressing/Treatment Pharmaceutical agent (see MAR)   Wound Length (cm) 4 cm   Wound Width (cm) 4 cm   Wound Depth (cm) 0.1 cm   Wound Surface Area (cm^2) 16 cm^2   Change in Wound Size % (l*w) -416.13   Wound Volume (cm^3) 1.6 cm^3   Wound Healing % -416   Wound Assessment Red;Purple  (multiple sites in close proximity)   Drainage Amount Scant   Drainage Description Serosanguinous   Odor None   Hans-wound Assessment Intact   Red%Wound Bed 50   Purple%Wound Bed 50   Wound 08/01/20 Heel Left;Medial   Date First Assessed/Time First Assessed: 08/01/20 1350   Primary Wound Type: Pressure Injury  Location: Heel  Wound Location Orientation: Left;Medial   Wound Deep tissue/Injury   Dressing/Treatment Foam   Wound Length (cm) 3.2 cm   Wound Width (cm) 3 cm   Wound Depth (cm)   (unable to determine)   Wound Surface Area (cm^2) 9.6 cm^2   Change in Wound Size % (l*w) 78.18   Wound Assessment Purple   Drainage Amount None   Hans-wound Assessment Intact   Purple%Wound Bed 100   Wound 09/10/20 Heel Left;Lateral   Date First Assessed/Time First Assessed: 09/10/20 0100   Present on Hospital Admission: Yes  Location: Heel  Wound Location Orientation: Left;Lateral   Wound Image    Wound Deep tissue/Injury   Dressing/Treatment Foam   Wound Length (cm) 0.8 cm   Wound Width (cm) 1.4 cm   Wound Depth (cm)   (unable to determine)   Wound Surface Area (cm^2) 1.12 cm^2   Change in Wound Size % (l*w) 66.06   Wound Assessment Purple   Drainage Amount None   Hans-wound Assessment Intact   Purple%Wound Bed 100     **Informed Consent**    photos taken of wounds and inserted into their chart as part of their permanent medical record for purposes of documentation, treatment management and/or medical review. All Images taken on 9/10/20 of patient name: Bernadette Fortune were transmitted and stored on [employer] located within [facility] [employer] [employer] by a registered [employer] Device.    Agitated at time of visit    Impression:   DTI coccyx, left posterior lower leg, bilateral heels    Plan:  Foam dressing bilateral heels,  Antifungal to medial thighs, coccyx, buttocks, gluteal folds, hans area  Will

## 2020-09-10 NOTE — PROGRESS NOTES
NICOM        SV SVI SVV HR CO CI    Baseline 50.2 30 18 86 4.3 2.4    Challenge 47.2 28 15 78 3.7 2.2    %change  3.1%            Not fluid responsive

## 2020-09-10 NOTE — PROGRESS NOTES
Stage  CI HR MAP TPRI SVI   Baseline 2.5 74 66 2120 33   Challenge 2.6 74 64 1998 35   Result (%Ä) 2.9 -0.2 -3.0 -5.8 3.2 none

## 2020-09-10 NOTE — PLAN OF CARE
Problem: Pain:  Goal: Pain level will decrease  Description: Pain level will decrease  9/10/2020 1254 by Rosina Tinoco RN  Outcome: Ongoing    Goal: Control of acute pain  Description: Control of acute pain  9/10/2020 1254 by Rosina Tinoco RN  Outcome: Ongoing    Goal: Control of chronic pain  Description: Control of chronic pain  9/10/2020 1254 by Rosina Tinoco RN  Outcome: Ongoing       Problem: Skin Integrity:  Goal: Will show no infection signs and symptoms  Description: Will show no infection signs and symptoms  9/10/2020 1254 by Rosina Tinoco RN  Outcome: Ongoing    Goal: Absence of new skin breakdown  Description: Absence of new skin breakdown  9/10/2020 1254 by Rosina Tinoco RN  Outcome: Ongoing       Problem: Falls - Risk of:  Goal: Will remain free from falls  Description: Will remain free from falls  9/10/2020 1254 by Rosina Tinoco RN  Outcome: Ongoing    Goal: Absence of physical injury  Description: Absence of physical injury  9/10/2020 1254 by Rosina Tinoco RN  Outcome: Ongoing

## 2020-09-10 NOTE — H&P
Thomas Taylor M.D. History and Physical      CHIEF COMPLAINT:  Chills and lightheaded     Reason for Admission: sepsis     History Obtained From: pt and emr   HISTORY OF PRESENT ILLNESS:      The patient is a 80 y.o. female of Ross Mckeon MD with significant past medical history of right br ca, cad, htn, dm t2 , CKD III  who presents with complaints of generalized fatigue and not feeling well with AMS. Eval in ed revealed hypotension not responsive to fluids . She was started on pressor support and transferred to icu. . Pt is feeling better toady in icu but on my eval indicates he is very chilled an d is covered in multiple blankets.    BP (!) 104/54   Pulse 68   Temp 99.3 °F (37.4 °C) (Bladder)   Resp (!) 33   Ht 5' 5\" (1.651 m)   Wt 135 lb 11.2 oz (61.6 kg)   SpO2 100%   BMI 22.58 kg/m²   Sodium 151  Wbc 22k   Urine dirty   cxr unremarkable   CT abdomen with pancreatitic head with inflammatory changes    Aorta with atherosclerosis   Past Medical History:        Diagnosis Date    Breast cancer ([address]     RIGHT BREAST MASTECTOMY    CAD (coronary artery disease)     Chronic back pain     Displacement of lumbar intervertebral disc without myelopathy     Forgetfulness     Hypertension     Influenza vaccine administered 03/2019    Memory change     Pneumococcal vaccine administered 03/2019    Spinal stenosis, lumbar region, without neurogenic claudication     Type II or unspecified type diabetes mellitus without mention of complication, not stated as uncontrolled      Past Surgical History:        Procedure Laterality Date    BREAST LUMPECTOMY  4/14/10    CORONARY ARTERY BYPASS GRAFT  2009    QUADRUPLE BYPASS    EYE SURGERY      HYSTERECTOMY  1987    KYPHOSIS SURGERY N/A 8/3/2020    KYPHOPLASTY T12, EPIDURAL INJECTION performed by Sakina Berg MD at [address] [facility]  5/6/10         Medications Prior to Admission:    Medications Prior to Admission: traMADol (ULTRAM) 50 MG tablet, Take 50 mg by mouth every 8 hours as needed for Pain. insulin lispro, 1 Unit Dial, (HUMALOG KWIKPEN) 100 UNIT/ML SOPN, If B-199 = 2u 200-249 = 4u 250-299 = 6u 300-349 = 8u 350-399 = 10u 400+ = 12u Before meals and at bedtime  blood glucose test strips (ACCU-CHEK LEONARDO) strip, 1 each by In Vitro route daily Test blood sugar daily  Wound Dressings (ALLEVYN ADHESIVE) PADS, Apply 1 each topically every 3 days And prn  memantine (NAMENDA) 10 MG tablet, TAKE 1 TABLET BY MOUTH TWICE DAILY  torsemide (DEMADEX) 5 MG tablet, Take 1 tablet by mouth daily (Give 1 tablet twice daily until swelling resolved)  atorvastatin (LIPITOR) 20 MG tablet, TAKE 1 TABLET BY MOUTH DAILY AT BEDTIME  levothyroxine (SYNTHROID) 88 MCG tablet, TAKE ONE(1) TABLET BY MOUTH ONCE DAILY **CYC  GLUTOSE 15 40 % GEL, USE 1 TUBE AS NEEDED  GLUCAGEN HYPOKIT 1 MG SOLR injection, USE AS DIRECTED 1MG AS NEEDED  lisinopril (PRINIVIL;ZESTRIL) 2.5 MG tablet, TAKE ONE(1) TABLET BY MOUTH ONCE DAILY  metoprolol succinate (TOPROL XL) 50 MG extended release tablet, Take 1 tablet by mouth daily Do not crush or chew. Blood Glucose Calibration (OT ULTRA/FASTTK CNTRL SOLN) SOLN,   NOVOFINE AUTOCOVER 30G X 8 MM MISC, USE AS DIRECTED TWICE DAILY. Cholecalciferol (VITAMIN D3) 2000 units CAPS, Take 1 capsule by mouth daily  acetaminophen (TYLENOL) 325 MG tablet, Take 650 mg by mouth every 6 hours as needed for Pain or Fever  Melatonin Gummies 2.5 MG CHEW, Take 2 each by mouth nightly  Ascorbic Acid (VITAMIN C) 500 MG tablet, Take 500 mg by mouth 2 times daily  LANTUS SOLOSTAR 100 UNIT/ML injection pen, INJECT 45 UNITS SUB-Q DAILY **DISCARD 28 DAYS AFTER OPENING*  HUMALOG MIX 75/25 KWIKPEN (75-25) 100 UNIT/ML SUPN injection pen, Inject 50 u SC q AM and 25 u SC q PM with dinner    Allergies:  Darvon [propoxyphene hcl]; Pcn [penicillins]; and Propoxyphene    Social History:   TOBACCO:   reports that she has never smoked.  She has never used smokeless tobacco.  ETOH:   reports no history of alcohol use. MARITAL STATUS:    OCCUPATION:      Family History:       Problem Relation Age of Onset    High Blood Pressure Mother     Kidney Disease Mother     Cancer Father         BONE & LUNG       REVIEW OF SYSTEMS:    General ROS: positive for  - chills, fatigue and fever  Hematological and Lymphatic ROS: negative  Endocrine ROS: negative  Respiratory ROS: no cough, shortness of breath, or wheezing  Cardiovascular ROS: no chest pain or dyspnea on exertion  Gastrointestinal ROS: no abdominal pain, change in bowel habits, or black or bloody stools  Genito-Urinary ROS: no dysuria, trouble voiding, or hematuria  Neurological ROS: no TIA or stroke symptoms  negative    Vitals:  /79   Pulse 67   Temp 98.1 °F (36.7 °C) (Bladder)   Resp 19   Ht 5' 5\" (1.651 m)   Wt 135 lb 11.2 oz (61.6 kg)   SpO2 100%   BMI 22.58 kg/m²     PHYSICAL EXAM:  General:  Awake, alert, oriented X 3. Well developed, well nourished, well groomed. No apparent distress. HEENT:  Normocephalic, atraumatic. Pupils equal, round, reactive to light. No scleral icterus. No conjunctival injection. Normal lips, teeth, and gums. No nasal discharge. Neck:  Supple  Heart:  RRR, no murmurs, gallops, rubs, carotid upstroke normal, no carotid bruits  Lungs:  CTA bilaterally, bilat symmetrical expansion, no wheeze, rales, or rhonchi  Abdomen:   Bowel sounds present, soft, nontender, no masses, no organomegaly, no peritoneal signs  Extremities:  No clubbing, cyanosis, or edema  Skin:  Warm and dry, no open lesions or rash  Neuro:  Cranial nerves 2-12 intact, no focal deficits  Breast: deferred  Rectal: deferred  Genitalia:  deferred      DATA:     Recent Labs     09/09/20  1456 09/10/20  0400   WBC 18.7* 22.1*   HGB 13.6 11.2*    169     Recent Labs     09/09/20  1456 09/09/20  2224 09/10/20  0400     --  150*   K 5.1*  --  4.2   *  --  124*   CREATININE 6.6* 4.5* 4.7* Recent Labs     09/09/20  1456 09/10/20  0400   PROT 6.6 5.3*   INR 1.0  --      Recent Labs     09/09/20  1456 09/10/20  0400   AST 28 27   ALT 22 18   ALKPHOS 136* 105*   BILITOT 0.3 0.3     No results for input(s): BNP in the last 72 hours. Recent Labs     09/09/20  1456 09/10/20  0220 09/10/20  0400 09/10/20  0844   CKTOTAL  --   --  627*  632*  --    CKMB  --   --  16.4*  --    TROPONINI 0.24* 0.39*  --  0.08*       ASSESSMENT:      Principal Problem:    Septic shock (HCC)  Active Problems:    Type 2 diabetes mellitus with stage 3 chronic kidney disease, with long-term current use of insulin (HCC)    Mixed hyperlipidemia    Hypothyroidism    Vitamin D deficiency    History of hypertension    Falls frequently    UTI (urinary tract infection)    Hypotension  Resolved Problems:    * No resolved hospital problems.  *        PLAN:    Admit to ICU for Sepsis shock from urine source   Check us renal   IV cefepime pending pan cultures   Iv fluids   Serial lactic   chack amylase and lipase     Altered mentation   Improved with pressors and iv abx     Acute on Chronic renal failure   Check US to r/o hydronephrosis   Hydrate   Stop nephrotoxins      DMT2  iss   Insulin drip if needed in critical pt     DVT proph  Pt/OT  Dc plan     Nelly Fairchild MD  9/10/2020  12:01 PM

## 2020-09-10 NOTE — CARE COORDINATION
Patient admitted to MICU and currently on room air; levo gtt now off. Hx breast Ca with R breast mastectomy, CAD, DM, falls. Palliative consulted. NG/TF ordered. Speech consulted. Patient came to us from [facility]- Per Trevor Wang patient is not a bedhold and will need precert to return. I met with  patient's daughter Lizette Leo outside the patient's room (RN was placing NG tube) to discuss transition of care at discharge. She states patient was at [facility] for 1 1/2 yrs for [place]. She had a kyphoplasty 1 month ago d/t fall and returned to [facility] at [facility] for rehab. Plan is to return to [facility] when patient is medically stable. CM/SW will continue to follow for discharge planning.

## 2020-09-10 NOTE — CONSULTS
[facility]  Internal Medicine Residency Program  History and Physical  MICU    Patient:  Yara To 80 y.o. female MRN: [id number]     Date of Service: 9/10/2020    Hospital Day: 2      Chief complaint: Altered mental status, hypotension  History of Present Illness     Yara To is a 80 y.o. female with past medical history of recurrent UTIs, falls, breast cancer status post right breast lumpectomy, CAD, hyperlipidemia, hypertension, compression fracture, type 2 diabetes mellitus, and stage III CKD who presented to the ED with altered mental status and hypotension     In the ED she was given IV fluid however her hypotension was nonresponsive  she was also found to have a leukocytosis at 18.7, a lactic acidosis at 4.8, and an MARLENE with Cr 6.6, UA was positive, CT head was negative for an acute process, CT abdomen pelvis showed concern for acute pancreatitis lipase was 21. She was started on cefepime for the UTI. Central venous catheter was inserted and she was started on Levophed. She was admitted to the ICU for treatment of septic shock and MARLENE. It was difficult to elicit a history on the patient as she is very altered. On exam she was thrashing and screaming not to touch her that we were hurting her and that she wanted to be covered. We called the nursing home however they were not able to provide us with any information due to confidentiality.     ED Meds: Patient was given cefepime 2 g, Levophed  ED Fluids: Patient was given 3 L normal saline IV fluid bolus    Past Medical History:      Diagnosis Date    Breast cancer ([address]     RIGHT BREAST MASTECTOMY    CAD (coronary artery disease)     Chronic back pain     Displacement of lumbar intervertebral disc without myelopathy     Forgetfulness     Hypertension     Influenza vaccine administered 03/2019    Memory change     Pneumococcal vaccine administered 03/2019    Spinal stenosis, lumbar region, without neurogenic claudication     Type II or unspecified type diabetes mellitus without mention of complication, not stated as uncontrolled        Past Surgical History:        Procedure Laterality Date    BREAST LUMPECTOMY  4/14/10    CORONARY ARTERY BYPASS GRAFT  2009    QUADRUPLE BYPASS    EYE SURGERY      HYSTERECTOMY      KYPHOSIS SURGERY N/A 8/3/2020    KYPHOPLASTY T12, EPIDURAL INJECTION performed by Murphy Gomez MD at [address] [facility]  5/6/10       Medications Prior to Admission:    Prior to Admission medications    Medication Sig Start Date End Date Taking? Authorizing Provider   traMADol (ULTRAM) 50 MG tablet Take 50 mg by mouth every 8 hours as needed for Pain.    Yes Historical Provider, MD   insulin lispro, 1 Unit Dial, (HUMALOG KWIKPEN) 100 UNIT/ML SOPN If B-199 = 2u  200-249 = 4u  250-299 = 6u  300-349 = 8u  350-399 = 10u  400+ = 12u  Before meals and at bedtime   Yes Historical Provider, MD   blood glucose test strips (ACCU-CHEK LEONARDO) strip 1 each by In Vitro route daily Test blood sugar daily 20  Yes Historical Provider, MD   Wound Dressings (ALLEVYN ADHESIVE) PADS Apply 1 each topically every 3 days And prn 20  Yes Liborio Cogan, MD   memantine (NAMENDA) 10 MG tablet TAKE 1 TABLET BY MOUTH TWICE DAILY 6/10/20  Yes Liborio Cogan, MD   torsemide (DEMADEX) 5 MG tablet Take 1 tablet by mouth daily (Give 1 tablet twice daily until swelling resolved) 20  Yes Liborio Cogan, MD   atorvastatin (LIPITOR) 20 MG tablet TAKE 1 TABLET BY MOUTH DAILY AT BEDTIME 20  Yes Liborio Cogan, MD   levothyroxine (SYNTHROID) 88 MCG tablet TAKE ONE(1) TABLET BY MOUTH ONCE DAILY **CYC 20  Yes Liborio Cogan, MD   GLUTOSE 15 40 % GEL USE 1 TUBE AS NEEDED 3/27/20  Yes Liborio Cogan, MD   GLUCAGEN HYPOKIT 1 MG SOLR injection USE AS DIRECTED 1MG AS NEEDED 3/27/20  Yes Liborio Cogan, MD   lisinopril (PRINIVIL;ZESTRIL) 2.5 MG tablet TAKE ONE(1) TABLET BY MOUTH ONCE DAILY 3/5/20  Yes Liborio Cogan, MD   metoprolol succinate (Willetta Radar XL) 50 MG extended release tablet Take 1 tablet by mouth daily Do not crush or chew. 3/2/20  Yes Adan Yeager MD   Blood Glucose Calibration (OT ULTRA/FASTTK CNTRL SOLN) SOLN  10/22/19  Yes Historical Provider, MD Ole Alexandre 30G X 8 MM MISC USE AS DIRECTED TWICE DAILY. 9/4/19  Yes Adan Yeager MD   Cholecalciferol (VITAMIN D3) 2000 units CAPS Take 1 capsule by mouth daily 7/19/19  Yes Historical Provider, MD   acetaminophen (TYLENOL) 325 MG tablet Take 650 mg by mouth every 6 hours as needed for Pain or Fever 11/29/18  Yes Historical Provider, MD   Melatonin Gummies 2.5 MG CHEW Take 2 each by mouth nightly 11/29/18  Yes Historical Provider, MD   Ascorbic Acid (VITAMIN C) 500 MG tablet Take 500 mg by mouth 2 times daily 11/29/18  Yes Historical Provider, MD   LANTUS SOLOSTAR 100 UNIT/ML injection pen INJECT 45 UNITS SUB-Q DAILY **DISCARD 28 DAYS AFTER OPENING* 6/2/20   Adan Yeager MD   HUMALOG MIX 75/25 KWIKPEN (75-25) 100 UNIT/ML SUPN injection pen Inject 50 u SC q AM and 25 u SC q PM with dinner 4/21/20   Adan Yeager MD       Allergies:  Darvon [propoxyphene hcl]; Pcn [penicillins]; and Propoxyphene    Social History:   TOBACCO:   reports that she has never smoked. She has never used smokeless tobacco.  ETOH:   reports no history of alcohol use.       Family History:       Problem Relation Age of Onset    High Blood Pressure Mother     Kidney Disease Mother     Cancer Father         BONE & LUNG       REVIEW OF SYSTEMS: Unable to assess due to patient's altered mental status        Physical Exam   · Vitals: /64   Pulse 89   Temp 97.7 °F (36.5 °C) (Bladder)   Resp 26   Ht 5' 5\" (1.651 m)   Wt 135 lb 12.8 oz (61.6 kg)   SpO2 100%   BMI 22.60 kg/m²     · General Appearance: Alert, disoriented, agitated, malnourished, in no acute distress  · Skin: warm and dry, no rash or erythema  · Head: normocephalic and atraumatic  · Eyes: pupils equal, round, and reactive to light, extraocular eye movements intact, conjunctivae normal  · ENT: tympanic membrane, external ear and ear canal normal bilaterally, nose without deformity, nasal mucosa and turbinates normal without polyps  · Neck: supple and non-tender without mass, no thyromegaly or thyroid nodules, no cervical lymphadenopathy  · Pulmonary/Chest: clear to auscultation bilaterally- no wheezes, rales or rhonchi, normal air movement, no respiratory distress  · Cardiovascular: normal rate, regular rhythm, normal S1 and S2, no murmurs, rubs, clicks, or gallops, distal pulses intact, no carotid bruits  · Abdomen: soft, non-tender, non-distended, normal bowel sounds, no masses or organomegaly  · Extremities: no cyanosis, clubbing or edema, wound on right heel, wound on buttocks and inner thighs bilaterally that is red and raw  · Musculoskeletal: normal range of motion, no joint swelling, deformity or tenderness  · Neurologic: reflexes normal and symmetric, no cranial nerve deficit, gait, coordination and speech normal      Lines     site day    Art line   None    TLC R Fem <1   PICC None    Hemoaccess None    Oxygen:     Saturating at 100% on room air    Labs and Imaging Studies   Basic Labs  CBC:   Lab Results   Component Value Date    WBC 18.7 2020    RBC 4.19 2020    HGB 13.6 2020    HCT 42.0 2020    .2 2020    RDW 13.7 2020     2020     BMP:    Lab Results   Component Value Date     2020    K 5.1 2020     2020    CO2 16 2020     2020     Iron Studies:    Lab Results   Component Value Date    TIBC 313 2011     FOLATE:    Lab Results   Component Value Date    FOLATE 10.0 2015       Imaging Studies:     Ct Abdomen Pelvis Wo Contrast Additional Contrast? None    Result Date: 9/10/2020  Patient MRN:  [id number] : 1933 Age: 80 years Gender: Female Order Date:  2020 10:48 PM EXAM: CT ABDOMEN PELVIS WO CONTRAST COMPARISON: None INDICATION:  ab pain ab pain TECHNIQUE:  Low-dose CT acquisition technique included one of the following options; 1. Automated exposure control, 2. Adjustment of mA and/or kV according to the patient's size or 3. Use of iterative reconstruction. FINDINGS: There are inflammatory changes surrounding the pancreatic head and duodenum. There is heterogeneous appearance of the pancreatic head. There is no intrahepatic bile duct dilatation. Common bile duct is not visualized. No hydronephrosis. No significant perinephric fat stranding. There is no bowel obstruction or free air. Moderate amount of stool is present throughout the colon. The appendix is visualized and normal. Severe atherosclerotic calcifications are associated with the abdominal aorta. View of the lung bases shows no evidence of pneumonia. There is a small hiatal hernia. There is a chronic compression fracture involving T12 with evidence of kyphoplasty and retropulsion resulting in significant central canal stenosis. 1. Thickened heterogeneous appearance of the pancreatic head with surrounding inflammatory changes. Findings could suggest acute pancreatitis versus duodenitis. 2. No bowel obstruction, free air, or free fluid. 3. Moderate amount stool is seen throughout the colon. 4. Severe atherosclerotic disease is associated with the abdominal aorta. Ct Head Without Contrast    Result Date: 2020  Patient MRN: [id number] : 1933 Age:  80 years Gender: Female Order Date: 2020 4:05 PM Exam: CT HEAD WO CONTRAST Number of Images: 148 views Indication:  Acute confusion. Ams Comparison: 2020 TECHNIQUE: Region of study: Head. CT images acquired without intravenous contrast. One or more of these dose optimization techniques were utilized: Automated exposure control; mA and/or kV adjustment per patient size (includes targeted exams where dose is matched to clinical indication); or iterative reconstruction. FINDINGS: No mass, hemorrhage or midline shift. There is atrophy and likely chronic microvascular ischemic disease as before. Bony calvarium unremarkable. Atrophy and likely chronic microvascular ischemic disease. Xr Chest Portable    Result Date: 2020  Patient MRN: [id number] : 1933 Age:  80 years Gender: Female Order Date: 2020 2:52 PM Exam: XR CHEST PORTABLE Indication:   ams ams Comparison: 116 FINDINGS: The lungs are clear. The heart is normal in size. Status post CABG. The aorta is mildly tortuous. No pulmonary vascular congestion. No pneumothorax or pleural effusion. Bones are demineralized. Vertebroplasty cement seen within a lower thoracic vertebral body. No acute cardiopulmonary abnormality. EKG:    Time: 1450  Rate: 70  Rhythm: Sinus  Interpretation: non-specific EKG  Comparison: None    Resident's Assessment and Plan     Misael Leon is a 80 y.o. female with past medical history of recurrent UTIs, falls, breast cancer status post lumpectomy, CAD, hyperlipidemia, hypertension, compression fracture, type 2 diabetes mellitus, and stage III CKD who presented to the ED with altered mental status and hypotension she was given IV fluid however her hypotension was nonresponsive so she was started on Levophed and admitted to the ICU. Neurology  1. Acute encephalopathy likely secondary to urinary tract infection   -CT head negative for acute process did show atrophy and chronic microvascular ischemic disease   -Patient is currently very altered unable to differentiate if this is a new alteration as we called the nursing home but they were unable to provide us with any information about the patient due to confidentiality. 2. Dementia     Cardiology  1. Elevated troponin   -Troponin was elevated 0.24, continue to trend, EKG showed normal sinus   -Patient denies any chest pain, palpitations    2. History of CAD    3.   History of hyperlipidemia    Pulmonology   -Patient is saturating at 98% on room air, unable to assess for respiratory complaints due to patient altered mental status.   -Chest x-ray showed no acute cardiopulmonary process    Renal  1. MARLENE on CKD stage III   -Creatinine 6.6, baseline Cr 1.3,    -Follow urine electrolytes, urine nitrogen, urine creatinine   -Follow retroperitoneal ultrasound   -Follow CK    2. High anion gap metabolic acidosis   -Anion gap 24    -Likely secondary to lactic acidosis and uremia   -Lactic acid was 4.8 on initial presentation   -, creatinine 6.6 on initial presentation   -Sodium bicarb IV    GI  1. Concern for acute pancreatitis -doubt   -Patiently initially presenting with abdominal pain in the ED, patient is very altered currently unable to assess if has current abdominal pain.   -Lipase was 21   -Patient nontender to palpation of abdomen   -Abdominal CT showed thickened appearance of the pancreatic head with surrounding inflammatory changing echogenic suggest acute pancreatitis versus duodenitis. -Phenergan Zofran for nausea   -NPO    ID  1. Septic shock likely secondary to UTI versus multiple wounds   -Patient's blood pressure initially in the ED was 60/40 initially responsive to fluids. Continue to remain hypotensive. Started on levophed 2 mcg/min. Patient's blood pressure has improved and is currently 102/62 on Levophed.   -Wean Levophed as tolerated. -White blood cells elevated at 18.7, lactic acid originally,  6.6-->2.0, UA was positive for large leukocyte esterase, blood, packed white blood cells, and moderate bacteria. -Patient's urine was a milky white color with sediments.   -Patient also has multiple wounds that are raw and red could be source of septic shock.   Wounds are on the buttocks bilaterally and on the inner thighs bilaterally as well as on the right heel.   -Wound culture pending, blood cultures pending, urine culture pending   -She is on Levaquin for the wounds at home the last.   -Continue cefepime    -Cortisol, TSH ordered,   - Solu-Cortef 100 mg IV every 8 hours     Endocrine  1. History of insulin-dependent diabetes mellitus type 2   -Continue to monitor blood sugars with daily CMP,   - most recent blood sugar was 187.   -Insulin Lantus 30 units nightly and Low-dose sliding scale   - POCT glucose every 4hrs    -Hemoglobin A1c ordered    2. Hypothyroidism    -Continue home Synthroid dose of 88 mcg daily    MSK  1. History of compression fracture status post kyphoplasty to T12    -Patient had a compression fracture and a recent kyphoplasty to T12 on 8/3/2020   -No spinal tenderness, low concern for spinal epidural abscess   -Tramadol as needed for pain        PT/OT evaluation: Pending stabilization  DVT prophylaxis/ GI prophylaxis: Heparin/GlycoLax  Disposition: MICU    Abhinav Pressley DO, PGY-1   Attending physician: Dr. Michael Granda      I personally saw, examined and provided care for the patient. Radiographs, labs and medication list were reviewed by me independently. I spoke with bedside nursing, therapists and consultants. Critical care services and times documented are independent of procedures and multidisciplinary rounds with Residents. Additionally comprehensive, multidisciplinary rounds were conducted with the MICU team. The case was discussed in detail and plans for care were established. Review of Residents documentation was conducted and revisions were made as appropriate. I agree with the above documented exam, problem list and plan of care. Wean pressor as tolerated   Abx   Palliative care consult     Rome Sheridan M.D. Pulmonary/Critical Care Medicine   39 mi ncct excluding procedures   Care discussed with the patients daughter at the bedside.

## 2020-09-11 NOTE — PROGRESS NOTES
Occupational Therapy  Occupational Therapy Initial Evaluation   Date:2020  Patient Name: Lainey Nur  MRN: [id number]  : 1933  Room: [address] [place] [address]-A    Referring Provider: Fransisca Castañeda MD     Evaluating OT: Charlie Hermosillo OTR/L #[id number]      AM-PAC Daily Activity Raw Score:     Recommended Adaptive Equipment: TBD       Diagnosis: Septic shock ([facility] [address] [A41.9, R65.21]  Septic shock ([facility] [address] [A41.9, R65.21]    Reason for admission: AMS/hypotensive    Surgery/Procedure: none    Pertinent Medical History: breast cancer, CAD, chronic back pain, HTN, spinal stenosis, type 2 DM, recent kyphoplasty 1 month ago      Precautions:  Falls, NGT, spinal precautions(recent kypho 1 month ago), PEG     Home Living: Pt admitted from [facility] per chart    Prior Level of Function: Assistance with ADLs; Assistance with IADLs. Pt reported no AD for ambulation.    Driving: No  Occupation: n/a    Pain Level: pt c/o 0/10 pain at rest; minimal pain with movement noted      Cognition: A&O: 2/4(oriented to self and place) Follows 1 step commands    Memory: poor   Comprehension: fair-   Problem solving: fair-   Judgement/safety: fair-               Communication skills: wfl           Vision: wfl               Glasses:yes, not present                                                   Hearing: wfl     RASS: 0  CAM-ICU: (NT) Delirium    UE Assessment:  Hand Dominance: Right [x]  Left []     ROM Strength STM goal: PRN   RUE  Proximally: limited to mid range shoulder flexion  Distally: WFL Proximally: 3-/5  Distally: 4-/5 Increase grossly to 4/5     LUE Proximally: limited to mid range shoulder flexion  Distally: WFL Proximally: 3-/5  Distally: 4-/5 Increase grossly to 4/5     [address]    Sensation: No c/o numbness or tingling in extremities   Tone: WNL   Edema: none noted     Functional Assessment   Initial Eval Status  Date: 20 Treatment Status  Date: STG=LTG  5-14  days    Feeding PEG                        Continue to assess     Grooming Mod. A                        Min. A   while seated EOB     UB dressing Mod. A                        Min. A       LB dressing Dep  Max A   using AE as needed for safe reach/ energy conservation     Bathing Max A (simulated)                        Mod. A   using AE as needed for safe reach/ energy conservation       Toileting Dep                        Max A     Bed Mobility  Supine to sit: Max A (log roll)    Sit to supine: Max A x2 (log roll)                        Mod. A  in prep of ADL tasks & transfers   Functional Transfers Sit to stand: Max A x2 (unable to complete full sit to stand)    Stand to sit: Max A x2                       Mod. A  sit<>stand/functional bathroom transfers using AD/DME as needed for balance and safety   Functional Mobility NT                       Mod A w/ ww   functional/bathroom mobility using AD as needed & demonstrating good safety     Balance Sitting:     Static:  SBA    Dynamic:Min. A  Standing: Max A x2   independent dynamic sitting balance; Mod. A dynamic standing balance  during ADL tasks & transfers   Endurance/Activity Tolerance   Poor+ tolerance with light activity. Pt sat EOB for >5 minutes incorporating light functional reaching/activity tolerance for ADLs  Fair+   tolerance with light/moderate activity/self care routine   Visual/  Perceptual WFL                       Vitals:   HR at rest: 66 bpm HR at end of session: 71 bpm   Spo2 at rest:100% on RA Spo2 at end of session 100% on RA   BP at rest:106/47 mmHg BP at end of session 86/49 mmHg in bed (pt did not report lightheadedness/dizziness)       Treatment:   · Bed mobility: Facilitated bed mobility with cues for proper body mechanics and sequencing to prepare for ADL completion. Educated pt on log roll to maintain spinal precautions. · Functional transfers: Facilitated transfers from various surfaces with cues for body alignment, safety and hand placement.   · ADL completion: Self-care retraining for the above-mentioned ADLs; training on proper hand placement, safety technique(spinal precautions), sequencing, and energy conservation techniques. · Therapeutic Exercises: B UE AROM completed within available range to maintain strength/flexibility and to decrease edema/contractures to enhance ADL completion. · Postural Balance: Sitting balance retraining to improve righting reactions with postural changes during ADLS. · Cognitive/Perceptual training: retraining exercises to improve attention, mentation, and spatial awareness for ADLs & transfers. · Skilled positioning: Proper positioning to improve interaction with environment, overall functioning and decrease/prevent edema and contractures. · Delirium Prevention/Management: Implementation of non-pharmacologic interventions for delirium prevention incorporated throughout session to patient. Including environmental and sensory modifications to decrease patient's internal distraction caused by delirium, and improve overall mentation. Comments: OK from RN to see patient. Upon arrival, patient lying in bed. Pt demo poor+ tolerance with fair- understanding of education/techniques. At end of session, patient lying in bed. Call light within reach, all lines and tubes intact. Pt instructed on use of call light for assistance and fall prevention. Line management and environmental modifications made prior to and end of session to ensure patient safety and to increase efficiency of session. Skilled monitoring of HR, O2 saturation, blood pressure and patient's response to activity performed throughout session. Overall, pt presents with decreased activity tolerance, dynamic balance, functional mobility limiting completion of ADLs and safe return home. Pt can benefit from continued skilled OT to increase safety and functional independence.      Evaluation Complexity: Mod    · History: Expanded chart review of consults, imaging, and psychosocial history related to current functional performance. · Exam: 5+ performance deficits identified limiting functional independence and safe return home   · Assistance/Modification: Min/mod assistance or modifications required to perform tasks. May have comorbidities that affect occupational performance. Assessment of current deficits   Functional mobility [x]  ADLs [x] Strength [x]  Cognition [x]  Functional transfers  [x] IADLs [x] Safety Awareness [x]  Endurance [x]  Fine Motor Coordination [x] Balance [x] Vision/perception [] Sensation []   Gross Motor Coordination [] ROM [x] Delirium [x]                  Communication []    Plan of Care:  OT 1-3x/week for 5-7 days PRN   [x] ADL retraining/AE recommendations (spinal precautions)  [x] Energy Conservation Techniques/Strategies      [] Neuromuscular Re-Education      [x] Functional Transfer Training         [x] Functional Mobility Training          [x] Cognitive Re-Training (to increase overall mentation for ADLs)         [x] Splinting/Positioning Needs           [x] Therapeutic Activity   [x]Therapeutic Exercise   [] Visual/Perceptual   [x] Delirium Prevention/Treatment   [x] Positioning to Improve Functional [place], Safety, and Skin Integrity   [x] Patient and/or Family Education to Increase Safety and Functional [place]   [] Other:            Rehab Potential: good for established goals    Patient / Family Goal: not stated    Patient and/or family were instructed/educated on diagnosis, prognosis/goals and plan of care. Pt demonstrated fair- understanding. [] Malnutrition indicators have been identified and nursing has been notified to ensure a dietitian consult is ordered.          Mod Evaluation   Time In: 9:35   Time Out: 10:05  Total Treatment Time: 25 minutes            Mins Units   OT Eval Low 92851     OT Eval Medium 97166 x 1   OT Eval High 36799     OT Re-Eval 50690     Ther Ex  70432     Manual Therapy Tonya Jacobo [phone]   ADL/Home Mgt

## 2020-09-11 NOTE — PLAN OF CARE
Problem: Pain:  Goal: Pain level will decrease  Description: Pain level will decrease  9/10/2020 2345 by Steph Quiroz RN  Outcome: Met This Shift  9/10/2020 1847 by Robles Gibbs RN  Outcome: Not Met This Shift  9/10/2020 1254 by Robles Gibbs RN  Outcome: Ongoing  Goal: Control of acute pain  Description: Control of acute pain  9/10/2020 2345 by Steph Quiroz RN  Outcome: Met This Shift  9/10/2020 1847 by Robles Gibbs RN  Outcome: Ongoing  9/10/2020 1254 by Robles Gibbs RN  Outcome: Ongoing  Goal: Control of chronic pain  Description: Control of chronic pain  9/10/2020 2345 by Steph Quiroz RN  Outcome: Met This Shift  9/10/2020 1847 by Robles Gibbs RN  Outcome: Ongoing  9/10/2020 1254 by Robles Gibbs RN  Outcome: Ongoing     Problem: Skin Integrity:  Goal: Absence of new skin breakdown  Description: Absence of new skin breakdown  9/10/2020 2345 by Steph Quiroz RN  Outcome: Met This Shift  9/10/2020 1847 by Robles Gibbs RN  Outcome: Met This Shift  9/10/2020 1254 by Robles Gibbs RN  Outcome: Ongoing     Problem: Falls - Risk of:  Goal: Will remain free from falls  Description: Will remain free from falls  9/10/2020 2345 by Steph Quiroz RN  Outcome: Met This Shift  9/10/2020 1847 by Robles Gibbs RN  Outcome: Met This Shift  9/10/2020 1254 by Robles Gibbs RN  Outcome: Ongoing  Goal: Absence of physical injury  Description: Absence of physical injury  9/10/2020 2345 by Steph Quiroz RN  Outcome: Met This Shift  9/10/2020 1847 by Robles Gibbs RN  Outcome: Met This Shift  9/10/2020 1254 by Robles Gibbs RN  Outcome: Ongoing     Plan of care reviewed with pt and family, as available.

## 2020-09-11 NOTE — CONSULTS
Palliative Care Department  Palliative Care Initial Consult  Provider: [place] APRN-CNP, AGACNP-BC    Hospital Day: 3    Referring Provider:  Jennifer Cerrato MD     Reason for Consult:   [x]  Code status Discussion  []  Assist with goals of care  []  Psychosocial support  []  Symptom Management  []  Advanced Care Planning    Chief Complaint: Marie Alcala is a 80 y.o. female with chief complaint of AMS, poor oral intal, dehydration       Active Hospital Problems    Diagnosis Date Noted    Septic shock ([facility] [address] [A41.9, R65.21] 09/09/2020    UTI (urinary tract infection) [N39.0] 09/09/2020    Hypotension [I95.9] 09/09/2020    Falls frequently [R29.6] 05/19/2020    Hypothyroidism [E03.9] 07/03/2019    Vitamin D deficiency [E55.9] 07/03/2019    History of hypertension [Z86.79] 07/03/2019    Type 2 diabetes mellitus with stage 3 chronic kidney disease, with long-term current use of insulin ([facility] [address] [E11.22, N18.3, Z79.4] 06/21/2017    Mixed hyperlipidemia [E78.2] 06/21/2017           Palliative Care Encounter/Recommendations:      - Goals of care: continue current management and to be determined     - Code Status: DNR-CCA - OK for a PEG if needed, NO Dialysis     - Symptom Management:        Subjective:     HPI:  Marie Alcala is a 80 y.o. female with significant past medical history of breast CA, UTIs, chronic back pain, HTN, spinal stenosis, diabetes mellitus, stage III CKD, falls, dementia, CAD , wounds who presented with AMS and hypotension. Patient from [facility] where she was for rehab after recent hospitalization for a fall. Subjective/Events/Discussions:  Patient was at [facility] for rehab. Found to have AMS and hypotension and brought to the hospital.  Spoke to patient's daughter Rasheed James at the bedside. Explained CODE STATUS in great detail.   Daughter stated that she would probably pick DNR CCA, but her sister Brian Smith is the healthcare power of  and that I will need to confirm it with her. We spoke about the patient's dementia. Patient has had dementia for approximately 5 years. Patient was placed in assisted living care memory unit approximately 1.5 years ago according to [address] [place] [address] for safety issues with agitation. HC-POA has nursing background and knew it was time to place her mom where she could live safely. Within the last 6 months patient's po intake has decreased, daughter called multiple times with patient's complaint of increased pain and noncompliance with physical therapy. Patient had fallen, had kyphoplasty a month ago. Family has not been able to see patient very much since beginning of [facility]. Patient has been at [facility] for rehab. Explained CODE STATUS in great detail to [address] [place] [address]. [facility]. LUZESSENCE/Trish asked me to please change her mother to a DNR CCA at this time. Anuradha Zelaya is okay to for a PEG tube to be placed if needed for hydration and nutrition. Pros and cons of PEG tube were explained to [address] [place] [address]. Anuradha Zelaya does not want dialysis for her mother. Talked about the progression of end-stage dementia, and the potential consult for hospice at this time if needed. Patient's daughter is going to give her mother 50 to 72 hours to see how she does.      - Family Meeting:   [facility] and patient   Family meeting was held to discuss:Code status, goals of care, prior expressed wishes and discharge plan    -Surrogate/Legal NOK: Child, Extended Family: and HCPOA    Contacts:    Ondina Ross - [facility], [address] sister  Lexy Doing [phone] Trish's       Past Medical History:   Diagnosis Date    Breast cancer [facility])     RIGHT BREAST MASTECTOMY    CAD (coronary artery disease)     Chronic back pain     Displacement of lumbar intervertebral disc without myelopathy     Forgetfulness     Hypertension     Influenza vaccine administered 03/2019    Memory change     Pneumococcal vaccine administered 03/2019    Spinal stenosis, lumbar region, without neurogenic claudication     Type II or unspecified type diabetes mellitus without mention of complication, not stated as uncontrolled        Past Surgical History:   Procedure Laterality Date    BREAST LUMPECTOMY  4/14/10    CORONARY ARTERY BYPASS GRAFT  2009    QUADRUPLE BYPASS    EYE SURGERY      HYSTERECTOMY  1987    KYPHOSIS SURGERY N/A 8/3/2020    KYPHOPLASTY T12, EPIDURAL INJECTION performed by Yeni Price MD at [address]  5/6/10       Family History   Problem Relation Age of Onset    High Blood Pressure Mother     Kidney Disease Mother     Cancer Father         BONE & LUNG       Social history:  [place] status: no  Marital status:   Living status: nursing home   Work history: retired     Allergies   Allergen Reactions    Darvon [Propoxyphene Hcl]     Pcn [Penicillins]     Propoxyphene        ROS: UNLESS STATED PATIENT DENIES:  CONSTITUTIONAL:  fever, chill, rigors, nausea, vomiting, fatigue. HEENT: blurry vision, double vision, hearing problem, tinnitus, hoarseness, dysphagia, odynophagia  RESPIRATORY: cough, shortness of breath, sputum expectoration. CARDIOVASCULAR:  Chest pain/pressure, palpitation, syncope, irregular beats  GASTROINTESTINAL:  abdominal or rectal pain, diarrhea, constipation, . GENITOURINARY:  Burning, frequency, urgency, incontinence, discharge  INTEGUMENTARY: rash, wound, pruritis  HEMATOLOGIC/LYMPHATIC:  Swelling, sores, gum bleeding, easy bruising, pica.   MUSCULOSKELETAL:  pain, edema, joint swelling or redness  NEUROLOGICAL:  light headed, dizziness, loss of consciousness, weakness, change in memory, seizures, tremors          Objective:     Physical Exam  BP (!) 102/50   Pulse 61   Temp 98.2 °F (36.8 °C) (Bladder)   Resp 21   Ht 5' 5\" (1.651 m)   Wt 141 lb 6.4 oz (64.1 kg)   SpO2 100%   BMI 23.53 kg/m²     Gen:  Alert, appears stated age, mal nourished, in no acute distress  HEENT:  Normocephalic, conjunctiva pink, no drainage, mucosa dry, poor dentition   Neck:  Supple  Lungs:  Diminished bilaterally, no audible rhonchi or wheezes noted, RA  Heart[de-identified]  RRR, no murmur, rub, or gallop noted during exam  Abd:  Soft, non tender, non distended, BS+  :  Catheter- sediment   Ext:  Moving all extremities, no edema, pulses present, R femoral TLC   Skin:  Warm and dry, wound care consulted  Neuro:  PERRL, Alert, oriented person and place ; follows simple commands        Current Medications:  Inpatient medications reviewed: yes  Home Medications reviewed: yes    Results/Verification of Data Review  Objective data reviewed: labs, images, records, medication use, vitals and chart      - Advanced Directives: Living Will and HC-POA- huyen Mireles    -  - Spiritual assessment: No spiritual distress identified     - Bereavement and grief: to be determined    - Discharge planning: discharge to ECF    - Prognosis: depends upon goals    - Referrals to: none today    Time/Communication  Greater than 51% of time spent, total 70 minutes in counseling and coordination of care at the bedside regarding Code status discussion . Assessment/Plan   1. Continue current treatment   2. Wound care-Bilateral heels, L posterior lower leg, DTI coccyx; preventative care, foam dressings, antifungal    3. Nephrology- MARLENE/CKD - appreciate recommendations   4. CM-Transition of care, from [facility]  5. Palliative Care- Code status DNR-CCA, PEG ok for hydration / nutrition, NO dialysis. Rhoda Lantigua APRN-CNP, AGACNP-BC  Palliative Medicine    Discussed patient and the plan of care with the other IDT members of Palliative Care, and with consultants, Primary Attending, patient, family and floor nurse. Thank you for allowing Palliative Medicine to participate in the care of Adalgisa Gentile.    Note: This report was completed using [place] [place] voiced recognition software.   Every effort has been made to ensure accuracy; however, inadvertent computerized transcription errors may be present.

## 2020-09-11 NOTE — PROGRESS NOTES
Palliative Medicine Social Work     Patient Name: Yara To  Age: 80 y.o.  Status: no    Next of Carmelina Muir                Ph:[phone]    Additional Support: daughter Jevon Sharma and son Rahat Blanchard    Minor Children: no    Advanced Directives: yes dated 7/2002 appoints Makayla Enamorado as main agent    Confirm Code Status: to be reviewed with [facility] History: no    Substance Abuse:no    Indications of Abuse/Neglect: no    Financial Concerns: no    Living Situation: resides at [address] [place] [address] Needs Met: yes    Emotional Needs Met:yes    Assessment: 79 yo  female seen in icu, she is alert but confused. History of breast Ca with R breast mastectomy, CAD, DM, falls, alzheimer's. Pt admitted from ecf with altered mental status and hypotension. Daughter Neva Yarbrough present, states she and her sister Makayla Enamorado have discussed pats wishes and are available to discuss goals of care.

## 2020-09-11 NOTE — PROGRESS NOTES
SPEECH/LANGUAGE PATHOLOGY  CLINICAL ASSESSMENT OF SWALLOWING FUNCTION    PATIENT NAME:  Timbo Martinez      :  1933      TODAY'S DATE:  2020  ROOM:  4430/4430-A    SUMMARY OF EVALUATION     DYSPHAGIA DIAGNOSIS:  Possible pharyngeal dysphagia, however Pt unwilling to accept PO to assess pharyngeal swallow      DIET RECOMMENDATIONS:  NPO (nothing by mouth including oral meds)     FEEDING RECOMMENDATIONS:     Assistance level:  Not applicable      Compensatory strategies recommended: Not applicable    THERAPY RECOMMENDATIONS:      Pt will complete PO trials of upgraded diet textures with SLP only to determine the least restrictive PO diet to maintain adequate nutrition/hydration with no more than 1 overt s/s of pen/asp. PROCEDURE     Consistencies Administered During the Evaluation   Liquids: --   Solids:  Pureed solids      Method of Intake:   spoon  Fed by caregiver/family      Position:   Seated, upright                  RESULTS     Oral Stage:        Dentition in fair state. Pt provided pureed texture to labial surface with immediate head jerk/ pull back reaction and no attempts to accept into oral cavity. Pharyngeal Stage:      Not assessed                  The Speech Language Pathologist (SLP) completed education with the patient regarding results of evaluation. Explained that Speech Pathology intervention is warranted  at this time   Prognosis for improvements is fair     This plan will be re-evaluated and revised in 1 week  if warranted. Patient stated goals: Did not state,   Treatment goals discussed with Patient and Family   The Patient did not demonstrate understanding of the diagnosis, prognosis and plan of care and Family understand(s) the diagnosis, prognosis and plan of care       CPT code:  38499  bedside swallow eval      [x]The admitting diagnosis and active problem list, as listed below have been reviewed prior to initiation of this evaluation.      ADMITTING DIAGNOSIS: Septic shock ([facility] [address] [A41.9, R65.21]  Septic shock ([address] [A41.9, R65.21]     ACTIVE PROBLEM LIST:   Patient Active Problem List   Diagnosis    Spinal stenosis, lumbar region, without neurogenic claudication    Displacement of lumbar intervertebral disc without myelopathy    L1 vertebral fracture (HCC)    T12 compression fracture (HCC)    Cerebral atherosclerosis    Type 2 diabetes mellitus with stage 3 chronic kidney disease, with long-term current use of insulin (HCC)    Mixed hyperlipidemia    Hypothyroidism    Vitamin D deficiency    History of hypertension    Coronary arteriosclerosis    Late onset Alzheimer's disease with behavioral disturbance (HCC)    Recurrent UTI    CKD (chronic kidney disease) stage 4, GFR 15-29 ml/min ([facility] [address]    Falls frequently    Thoracic compression fracture, closed, initial encounter ([facility]    Fall at home, sequela    Compression fracture of body of thoracic vertebra ([address]    Septic shock ([facility] [address]    UTI (urinary tract infection)    Hypotension

## 2020-09-11 NOTE — PROGRESS NOTES
SPEECH LANGUAGE PATHOLOGY  DAILY PROGRESS NOTE        PATIENT NAME:  Misael Leon      :  1933          TODAY'S DATE:  2020 ROOM:  4430/4430-A    Pt seen for dysphagia management post CBSE. Education provided r/t role of SLP in regards to swallowing function, overt s/s of pen/asp, and aspiration precautions. Pt and family educated on current diet recommendation of:   NPO (nothing by mouth including oral meds) with NGT for nutrition/hydration. Pt did not demonstrate interest in PO however Pt's daughter voiced an adequate understanding. Will follow.        CPT code(s) 83289  dysphagia tx  Total minutes :  15 minutes

## 2020-09-11 NOTE — PROGRESS NOTES
Subjective: The patient is awake and  answering questions     Objective:    BP (!) 113/52   Pulse 71   Temp 98.1 °F (36.7 °C) (Bladder)   Resp 16   Ht 5' 5\" (1.651 m)   Wt 141 lb 6.4 oz (64.1 kg)   SpO2 100%   BMI 23.53 kg/m²     In: 2193.6 [I.V.:1400.6; NG/GT:793]  Out: 1254     HEENT: NCAT,  PERRLA, No JVD  Heart:  RRR, no murmurs, gallops, or rubs.   Lungs:  CTA bilaterally, no wheeze, rales or rhonchi  Abd: bowel sounds present, nontender, nondistended, no masses  Extrem:  No clubbing, cyanosis, or edema     Recent Labs     09/09/20  1456 09/10/20  0400 09/11/20  0455   WBC 18.7* 22.1* 19.1*   HGB 13.6 11.2* 10.0*   HCT 42.0 34.6 30.3*    169 134       Recent Labs     09/11/20  0005 09/11/20  0110 09/11/20  0455    152* 148*   K 3.4* 3.7 3.1*   * 118* 112*   CO2 18* 19* 18*   BUN 98* 106* 94*   CREATININE 2.7* 3.6* 3.3*   CALCIUM 7.4* 7.9* 8.1*       Assessment:    Patient Active Problem List   Diagnosis    Spinal stenosis, lumbar region, without neurogenic claudication    Displacement of lumbar intervertebral disc without myelopathy    L1 vertebral fracture (HCC)    T12 compression fracture (HCC)    Cerebral atherosclerosis    Type 2 diabetes mellitus with stage 3 chronic kidney disease, with long-term current use of insulin (HCC)    Mixed hyperlipidemia    Hypothyroidism    Vitamin D deficiency    History of hypertension    Coronary arteriosclerosis    Late onset Alzheimer's disease with behavioral disturbance (HCC)    Recurrent UTI    CKD (chronic kidney disease) stage 4, GFR 15-29 ml/min ([facility])    Falls frequently    Thoracic compression fracture, closed, initial encounter ([facility] [address]    Fall at home, sequela    Compression fracture of body of thoracic vertebra (HCC)    Septic shock ([facility] [address]    UTI (urinary tract infection)    Hypotension       Plan:    Admit to ICU for Sepsis shock from urine source   Check us renal   IV cefepime pending pan cultures    slowly improving  wbc   Iv fluids   Serial lactic   Lipase normal      NSTEMI   Likely from decreased perfusion   monitor for chest pain   Keep hgb >8   Keep bp adequate for perfusion     Altered mentation   Improved with pressors and iv abx        Acute on Chronic renal failure / hypernatremia / hypokalemia   Check US to r/o hydronephrosis - no hydronephrosis   Hydrate aggressively   Stop nephrotoxins    Supplement lytes  Renal following      DMT2  iss   Insulin drip  in critical pt   Resume home insulin lantus         DVT Prophylaxis   PT/OT  Discharge planning       All consultants notes reviewed    Gregorio Patrick MD  11:58 AM  9/11/2020

## 2020-09-11 NOTE — CONSULTS
Nephrology Consult  [facility]  Selin Dee MD    CC:   arf    HPI:   The pt is an 79 yo female with a pmh of r breast cancer, htn, lbp, spinal stenosis, dm, dementia, cad who was admitted for ms changes and hypotension. She was started on levophed. She has ckd 3 with a baseline cr of 1.3. cr on admission was 6.6 and has improved to 3.3 so far. Other labs today show na 148 k 43.1 co2 18, vun 94, alb 2.4, wbc 19.1, hgb 10, plt 134. She has been started on a  Bicarb drip and free water. She is being treated for septic shock due to uti on cefepime. She is on iv steroids. april shows no hydro or stone. She did have a kyphoplasty in august. uo has been adequate. bp has been low. Her outpt lisinopril is on hold.      PMH:    Past Medical History:   Diagnosis Date    Breast cancer ([facility] [address]     RIGHT BREAST MASTECTOMY    CAD (coronary artery disease)     Chronic back pain     Displacement of lumbar intervertebral disc without myelopathy     Forgetfulness     Hypertension     Influenza vaccine administered 03/2019    Memory change     Pneumococcal vaccine administered 03/2019    Spinal stenosis, lumbar region, without neurogenic claudication     Type II or unspecified type diabetes mellitus without mention of complication, not stated as uncontrolled        Patient Active Problem List   Diagnosis    Spinal stenosis, lumbar region, without neurogenic claudication    Displacement of lumbar intervertebral disc without myelopathy    L1 vertebral fracture (HCC)    T12 compression fracture (HCC)    Cerebral atherosclerosis    Type 2 diabetes mellitus with stage 3 chronic kidney disease, with long-term current use of insulin (HCC)    Mixed hyperlipidemia    Hypothyroidism    Vitamin D deficiency    History of hypertension    Coronary arteriosclerosis    Late onset Alzheimer's disease with behavioral disturbance (HCC)    Recurrent UTI    CKD (chronic kidney disease) stage 4, GFR 15-29 ml/min ([facility] [address]    Falls frequently    Thoracic compression fracture, closed, initial encounter ([facility] [address]    Fall at home, sequela    Compression fracture of body of thoracic vertebra (HCC)    Septic shock ([facility] [address]    UTI (urinary tract infection)    Hypotension       Meds:     insulin glargine  15 Units Subcutaneous QAM AC    potassium chloride  40 mEq Intravenous Once    mupirocin   Nasal BID    sodium chloride flush  10 mL Intravenous 2 times per day    heparin (porcine)  5,000 Units Subcutaneous 3 times per day    cefepime  1 g Intravenous Q24H    levothyroxine  88 mcg Oral Daily    insulin glargine  30 Units Subcutaneous Nightly    hydrocortisone sodium succinate PF  100 mg Intravenous Q8H    influenza virus vaccine  0.5 mL Intramuscular Prior to discharge    memantine  10 mg Oral BID    miconazole nitrate   Topical BID    insulin lispro  0-12 Units Subcutaneous Q4H    midodrine  5 mg Oral TID WC    sodium chloride  1,000 mL Intravenous Once        dextrose      IV infusion builder 100 mL/hr at 20 1148       Meds prn:     sodium chloride flush, acetaminophen **OR** acetaminophen, polyethylene glycol, traMADol, glucose, dextrose, glucagon (rDNA), dextrose, miconazole nitrate **AND** miconazole nitrate, trimethobenzamide    Meds prior to admission:     No current facility-administered medications on file prior to encounter. Current Outpatient Medications on File Prior to Encounter   Medication Sig Dispense Refill    traMADol (ULTRAM) 50 MG tablet Take 50 mg by mouth every 8 hours as needed for Pain.       insulin lispro, 1 Unit Dial, (HUMALOG KWIKPEN) 100 UNIT/ML SOPN If B-199 = 2u  200-249 = 4u  250-299 = 6u  300-349 = 8u  350-399 = 10u  400+ = 12u  Before meals and at bedtime      blood glucose test strips (ACCU-CHEK LEONARDO) strip 1 each by In Vitro route daily Test blood sugar daily      Wound Dressings (ALLEVYN ADHESIVE) PADS Apply 1 each topically every 3 days And prn 20 each 2    memantine (NAMENDA) 10 MG tablet TAKE 1 TABLET BY MOUTH TWICE DAILY 62 tablet 11    torsemide (DEMADEX) 5 MG tablet Take 1 tablet by mouth daily (Give 1 tablet twice daily until swelling resolved) 30 tablet 3    atorvastatin (LIPITOR) 20 MG tablet TAKE 1 TABLET BY MOUTH DAILY AT BEDTIME 90 tablet 10    levothyroxine (SYNTHROID) 88 MCG tablet TAKE ONE(1) TABLET BY MOUTH ONCE DAILY **CYC 90 tablet 10    GLUTOSE 15 40 % GEL USE 1 TUBE AS NEEDED 37.5 g 10    GLUCAGEN HYPOKIT 1 MG SOLR injection USE AS DIRECTED 1MG AS NEEDED 1 each 10    lisinopril (PRINIVIL;ZESTRIL) 2.5 MG tablet TAKE ONE(1) TABLET BY MOUTH ONCE DAILY 31 tablet 11    metoprolol succinate (TOPROL XL) 50 MG extended release tablet Take 1 tablet by mouth daily Do not crush or chew. 1 tablet 10    Blood Glucose Calibration (OT ULTRA/FASTTK CNTRL SOLN) SOLN   5    NOVOFINE AUTOCOVER 30G X 8 MM MISC USE AS DIRECTED TWICE DAILY. 60 each 11    Cholecalciferol (VITAMIN D3) 2000 units CAPS Take 1 capsule by mouth daily  11    acetaminophen (TYLENOL) 325 MG tablet Take 650 mg by mouth every 6 hours as needed for Pain or Fever      Melatonin Gummies 2.5 MG CHEW Take 2 each by mouth nightly      Ascorbic Acid (VITAMIN C) 500 MG tablet Take 500 mg by mouth 2 times daily      LANTUS SOLOSTAR 100 UNIT/ML injection pen INJECT 45 UNITS SUB-Q DAILY **DISCARD 28 DAYS AFTER OPENING* 12 mL 11    HUMALOG MIX 75/25 KWIKPEN (75-25) 100 UNIT/ML SUPN injection pen Inject 50 u SC q AM and 25 u SC q PM with dinner 30 pen 11       Allergies:    Darvon [propoxyphene hcl]; Pcn [penicillins]; and Propoxyphene    Social History:     reports that she has never smoked. She has never used smokeless tobacco. She reports that she does not drink alcohol or use drugs.     Family History:         Problem Relation Age of Onset    High Blood Pressure Mother     Kidney Disease Mother     Cancer Father         BONE & LUNG       ROS:     Unable to assess given mental status changes      Physical Exam:      Patient Vitals for the past 24 hrs:   BP Temp Temp src Pulse Resp SpO2 Height Weight   09/11/20 1100 (!) 113/52 -- -- 71 16 100 % -- --   09/11/20 1000 (!) 82/30 -- -- 76 22 95 % -- --   09/11/20 0900 (!) 98/47 -- -- 71 17 98 % -- --   09/11/20 0800 (!) 103/52 98.1 °F (36.7 °C) Bladder 71 16 100 % -- --   09/11/20 0700 (!) 102/50 -- -- 61 21 100 % -- --   09/11/20 0600 (!) 104/47 -- -- 63 20 100 % -- --   09/11/20 0500 (!) 93/40 -- -- 74 14 100 % -- --   09/11/20 0459 -- -- -- 77 18 100 % 5' 5\" (1.651 m) 141 lb 6.4 oz (64.1 kg)   09/11/20 0400 (!) 119/49 98.2 °F (36.8 °C) Bladder 59 12 99 % -- --   09/11/20 0300 (!) 111/44 -- -- 72 27 97 % -- --   09/11/20 0200 (!) 111/47 -- -- 64 18 94 % -- --   09/11/20 0100 (!) 98/46 -- -- 62 20 95 % -- --   09/11/20 0000 (!) 108/49 98.8 °F (37.1 °C) Bladder 66 18 94 % -- --   09/10/20 2300 (!) 110/44 -- -- 73 15 98 % -- --   09/10/20 2244 -- 99 °F (37.2 °C) Bladder -- -- -- -- --   09/10/20 2200 (!) 116/49 -- -- 69 18 96 % -- --   09/10/20 2100 107/60 -- -- 70 19 97 % -- --   09/10/20 2000 (!) 110/54 -- -- 72 21 99 % -- --   09/10/20 1957 (!) 109/46 99.9 °F (37.7 °C) Bladder 74 15 99 % -- --   09/10/20 1900 (!) 86/50 -- -- 78 18 98 % -- --   09/10/20 1846 (!) 110/49 -- -- 78 -- -- -- --   09/10/20 1800 (!) 104/50 -- -- 78 22 97 % -- --   09/10/20 1700 (!) 104/49 -- -- 83 24 100 % -- --   09/10/20 1600 (!) 109/44 99.9 °F (37.7 °C) Bladder 75 20 100 % -- --   09/10/20 1500 (!) 104/54 -- -- 68 (!) 33 100 % -- --   09/10/20 1445 (!) 103/58 -- -- 65 20 99 % -- --   09/10/20 1400 (!) 112/52 -- -- 64 25 100 % -- --   09/10/20 1300 100/75 -- -- 75 22 99 % -- --         Intake/Output Summary (Last 24 hours) at 9/11/2020 1201  Last data filed at 9/11/2020 0900  Gross per 24 hour   Intake 3487.34 ml   Output 1489 ml   Net 1998.34 ml       Constitutional: Patient is lethargic  Head: normocephalic, atraumatic   Neck: supple, no jvd  Cardiovascular: regular rate and rhythm, no murmurs, gallops, or rubs   Respiratory: decreased bases  Gastrointestinal: soft, nontender, nondistended, no hepatosplenomegaly  Ext: tr edema  Neuro: awake oriented to person  Skin: dry, no rash   Back: nontender    Data:    Recent Labs     09/09/20  1456 09/10/20  0400 09/11/20  0455   WBC 18.7* 22.1* 19.1*   HGB 13.6 11.2* 10.0*   HCT 42.0 34.6 30.3*   .2* 100.9* 98.4    169 134       Recent Labs     09/09/20  2220  09/10/20  0400  09/11/20  0005 09/11/20  0110 09/11/20  0455   NA  --   --  150*   < > 139 152* 148*   K  --   --  4.2   < > 3.4* 3.7 3.1*   CL  --   --  119*   < > 108* 118* 112*   CO2  --   --  14*   < > 18* 19* 18*   CREATININE  --    < > 4.7*   < > 2.7* 3.6* 3.3*   BUN  --   --  124*   < > 98* 106* 94*   LABGLOM  --    < > 9   < > 17 12 13   GLUCOSE  --   --  159*   < > 630* 299* 213*   CALCIUM  --   --  8.1*   < > 7.4* 7.9* 8.1*   PHOS  --   --  4.3  --   --   --  2.9   MG 2.3  --  2.2  --   --   --  2.1    < > = values in this interval not displayed. Vit D, 25-Hydroxy   Date Value Ref Range Status   08/11/2020 56 30 - 100 ng/mL Final     Comment:     <20 ng/mL. ........... [place] Deficient  20-30 ng/mL. ......... [place] Insufficient   ng/mL. ........ [place] Sufficient  >100 ng/mL. .......... [place] Toxic         PTH   Date Value Ref Range Status   12/01/2015 97 (H) 15 - 65 pg/mL Final       Recent Labs     09/09/20  1456 09/10/20  0400 09/11/20  0455   ALT 22 18 18   AST 28 27 23   ALKPHOS 136* 105* 94   BILITOT 0.3 0.3 0.3       Recent Labs     09/09/20  1456 09/10/20  0400 09/11/20  0455   LABALBU 3.6 2.7* 2.4*       Iron   Date Value Ref Range Status   05/08/2012 99 50 - 170 mcg/dL Final     TIBC   Date Value Ref Range Status   03/09/2011 313 250 - 450 mcg/dL Final       Vitamin B-12   Date Value Ref Range Status   09/10/2020 657 211 - 946 pg/mL Final       Folate   Date Value Ref Range Status   09/10/2020 9.1 4.8 - 24.2 ng/mL Final         Lab Results   Component Value Date COLORU Yellow 09/09/2020    LABSPEC 1.0112 01/04/2020    NITRU Negative 09/09/2020    GLUCOSEU Negative 09/09/2020    GLUCOSEU NEGATIVE 12/17/2010    KETUA Negative 09/09/2020    UROBILINOGEN 0.2 09/09/2020    BILIRUBINUR Negative 09/09/2020    BILIRUBINUR Negative 01/04/2020       No results found for: ZAHRA, CREURRAN, MACREATRATIO, OSMOU    No components found for: URIC    No results found for: LIPIDPAN      Assessment and Plan:    1. arf   Baseline 1.3  Cr improved from 6.6 to 3.3 so far  april neg  fena >1 c/w atn  Continue hemdynamic support  Prerenal with hypotension, acei, decreaed po    2. Metabolic acidosis  In setting of arf, sepis, hypotension  Improving on hco3 drip    3. Sepsis  Urinary source  abx per id  Sp levophed  Continue ivf    4. hyerpnatremia  Follow on free water    5. Hypokalemia  Replace prn    Allan Pro.  Brittney Cottrell MD

## 2020-09-11 NOTE — PROGRESS NOTES
right heel, wound on buttocks and inner thighs bilaterally that is red and raw  § Musculoskeletal: normal range of motion, no joint swelling, deformity or tenderness  § Neurologic: reflexes normal and symmetric, no cranial nerve deficit, gait, coordination and speech normal  Lines     site day    Art line   None    TLC R Fem 1   PICC None    Hemoaccess None    Oxygen:     Saturating at 98% on 5L       Medications     Infusions: (Fluid, Sedation, Vasopressors)  IVF:    NaHCO3:at rate of 75 mL/h  Vasopressors   none  Sedation   none    Nutrition:   NG tube continuous renal formula at rate: 35ml/h  ATB:   Antibiotics  Days   cefepime 9/10   1 day             Skin issues: Multiple wounds on the buttocks, inner thighs, right heel wound care following  Patient currently has   Urinary cath  DVT prophylaxis/ GI prophylaxis,    SNF/NH placement  Palliative care consult     Labs     CBC with Differential:    Lab Results   Component Value Date    WBC 19.1 09/11/2020    RBC 3.08 09/11/2020    HGB 10.0 09/11/2020    HCT 30.3 09/11/2020     09/11/2020    MCV 98.4 09/11/2020    MCH 32.5 09/11/2020    MCHC 33.0 09/11/2020    RDW 13.6 09/11/2020    SEGSPCT 80 01/31/2014    METASPCT 1.7 09/11/2020    LYMPHOPCT 7.8 09/11/2020    MONOPCT 5.6 09/11/2020    MYELOPCT 0.9 09/11/2020    EOSPCT 0.5 01/06/2020    BASOPCT 0.4 09/11/2020    MONOSABS 0.00 09/11/2020    LYMPHSABS 1.53 09/11/2020    EOSABS 0.00 09/11/2020    BASOSABS 0.00 09/11/2020     CMP:    Lab Results   Component Value Date     09/11/2020    K 3.1 09/11/2020    K 3.7 09/11/2020     09/11/2020    CO2 18 09/11/2020    BUN 94 09/11/2020    CREATININE 3.3 09/11/2020    GFRAA 16 09/11/2020    LABGLOM 13 09/11/2020    GLUCOSE 213 09/11/2020    GLUCOSE 184 05/08/2012    PROT 4.8 09/11/2020    LABALBU 2.4 09/11/2020    LABALBU 4.3 05/08/2012    CALCIUM 8.1 09/11/2020    BILITOT 0.3 09/11/2020    ALKPHOS 94 09/11/2020    AST 23 09/11/2020    ALT 18 09/11/2020 9/10/2020  1. Thickened heterogeneous appearance of the pancreatic head with    surrounding inflammatory changes. Findings could suggest acute    pancreatitis versus duodenitis.         2. No bowel obstruction, free air, or free fluid.         3. Moderate amount stool is seen throughout the colon.         4. Severe atherosclerotic disease is associated with the abdominal    aorta. CT head without contrast 9/9/2020  Atrophy and likely chronic microvascular ischemic disease. Chest x-ray 9/9/2020  No acute cardiopulmonary abnormality. Resident's Assessment and Plan     Luly Woods is a 80 y.o. female with past medical history of recurrent UTIs, falls, breast cancer status post lumpectomy, CAD, hyperlipidemia, hypertension, compression fracture, type 2 diabetes mellitus, and stage III CKD who presented to the ED with altered mental status and hypotension she was given IV fluid however her hypotension was nonresponsive so she was started on Levophed and admitted to the ICU.     Neurology  1. Acute encephalopathy likely secondary to urinary tract infection              -CT head negative for acute process did show atrophy and chronic microvascular ischemic disease              -Patient is currently very altered unable to differentiate if this is a new alteration as we called the nursing home but they were unable to provide us with any information about the patient due to confidentiality.   -Mentation has improved      2. Dementia    -Memantine 10 mg twice daily   -Palliative care consulted     Cardiology  1. Elevated troponin secondary to an STEMI              -Troponin was elevated 0.24-->.39-->.08-->.30-->.25, continue to trend, EKG showed normal sinus              -Patient denies any chest pain, palpitations   -, CK-   -Follow repeat EKG     2.  History of CAD     3.   History of hyperlipidemia     Pulmonology              -Patient is saturating at 98% on room air, unable to assess for respiratory complaints due to patient altered mental status.              -Chest x-ray showed no acute cardiopulmonary process   -COVID-19 negative     Renal  1. MARLENE on CKD stage III              -Creatinine downtrending 6.6-->4.7-->4.2-->3.9-->3.6, baseline Cr 1.3,               -Follow urine electrolytes, urine nitrogen, urine creatinine              -Retroperitoneal ultrasound showed no evidence of hydronephrosis, or renal calculi   -Nephrology consulted, appreciate recommendations   -Follow I's and O's daily.   -Urine chloride 85, urine creatinine 29, urine potassium 13, urine sodium 93, urea nitrogen 383   -Sodium bicarb IV at 75 mL/h                2.  High anion gap metabolic acidosis secondary to lactic acidosis versus uremia              -Anion gap initially 24               -Likely secondary to lactic acidosis and uremia              -Lactic acid was 4.8 on initial presentation -->1.3              -, creatinine 6.6 on initial presentation currently downtrending              -Sodium bicarb at 75 mL/h   -GAP has closed      GI  1. Concern for acute pancreatitis -doubt              -Patiently initially presenting with abdominal pain in the ED, patient is very altered currently unable to assess if has current abdominal pain.              -Lipase was 21              -Patient nontender to palpation of abdomen              -Abdominal CT showed thickened appearance of the pancreatic head with surrounding inflammatory changing echogenic suggest acute pancreatitis versus duodenitis. -Phenergan Zofran for nausea              -NPO     ID  1. Septic shock likely secondary to UTI versus multiple wounds              -Patient's blood pressure initially in the ED was 60/40 initially responsive to fluids. Continue to remain hypotensive. Started on levophed 2 mcg/min. Patient's blood pressure is currently 111/47 and has been stable off pressors.              -Levophed discontinued. -White blood cells elevated initially 18.7-->22.1-->19.1, lactic acid originally,  6.6-->2.0   - UA was positive for large leukocyte esterase, blood, packed white blood cells, and moderate bacteria. -Patient's urine was a milky white color with sediments.              -Patient also has multiple wounds that are raw and red could be source of septic shock. Wounds are on the buttocks bilaterally and on the inner thighs bilaterally as well as on the right heel.              -Wound culture pending   -Wound care following   -Blood culture showed no growth   - urine culture showed E. coli greater more than 100,000 CFU per milliliter, sensitivity to follow              -She is on Levaquin for the wounds at home the last.              -Continue cefepime, pending pan cultures               -Cortisol , TSH ordered              - Solu-Cortef 100 mg IV every 8 hours   -Midodrine 5 mg, 3 times daily   -NICOM was non-fluid responsive with SVI 3.2% change.   -Cortisol 31.05                Endocrine  1. History of insulin-dependent diabetes mellitus type 2              -Continue to monitor blood sugars with daily CMP,              - most recent blood sugar was 299              -Insulin Lantus 30 units nightly and medium-dose sliding scale              - POCT glucose every 4hrs               - Hemoglobin A1c 8.1     2. Hypothyroidism               -Continue home Synthroid dose of 88 mcg daily   -TSH 9.55, free T4 0.97    3. Hypocalcemia    -Consider calcium supplementation    MSK  1.   History of compression fracture status post kyphoplasty to T12               -Patient had a compression fracture and a recent kyphoplasty to T12 on 8/3/2020              -No spinal tenderness, low concern for spinal epidural abscess              -Tramadol as needed for pain        PT/OT evaluation: Pending stabilization  DVT prophylaxis/ GI prophylaxis: Heparin/GlycoLax  Disposition: MICU       Genna Blood, DO, PGY-1  Attending

## 2020-09-11 NOTE — PROGRESS NOTES
Physical Therapy    Physical Therapy Initial Assessment     Name: Adalgisa Gentile  : 1933  MRN: [id number]    Referring Provider:  Karlo Messina MD    Date of Service: 2020    Evaluating PT:  Jeff Gardner PT, DPT [id number]     Room #:  6335/6401-Q  Diagnosis:  Septic shock   PMHx/PSHx:  Breast CA with R breast mastectomy, CAD, chronic back pain, displacement of lumbar intervertebral disc without myelopathy, HTN, spinal stenosis, type II DM, kyphoplasty with stable T12 compression fracture recent admission   Precautions:  Fals, Spinal precautions, NGT, Cognition   Equipment Needs:  NA    SUBJECTIVE:    Pt admitted from [facility]. Unable to provide social hx or PLOF d/t cognition. OBJECTIVE:   Initial Evaluation  Date: 20 Treatment Short Term/ Long Term   Goals   AM-PAC 6 Clicks 0/94     Was pt agreeable to Eval/treatment? Yes     Does pt have pain? Pain in BLE during mobility      Bed Mobility  Rolling: Max A  Supine to sit: Max A   Sit to supine: Max A x2  Scooting: Max A x2  Rolling: Min A  Supine to sit: Min A  Sit to supine: Min A  Scooting: Min A   Transfers Sit to stand: Max A x2 -- unable   Stand to sit: Max A x2  Stand pivot: NT  Sit to stand: Mod A  Stand to sit: Mod A  Stand pivot: Mod A with [employer]    Ambulation    NT  >15 feet with [employer] Mod A    Stair negotiation: ascended and descended  NT  NA   ROM BUE:  Per OT eval  BLE:  WFL     Strength BUE:  Per OT eval   BLE:  Grossly 2/5     Balance Sitting EOB:  Min A  Static Standing: Max A x2  Sitting EOB:  SBA  Dynamic Standing:   Mod A     Pt is A & O x 2  RASS:  0  CAM-ICU:  NT  Sensation:  Pt denies numbness and tingling to extremities  Edema:  Unremarkable     Vitals:  Blood Pressure at rest 106/47 Blood Pressure post session 86/49   Heart Rate at rest 63 bpm Heart Rate post session 68 bpm   SPO2 at rest 100%  SPO2 post session 97%        Functional Status Score-Intensive Care Unit (FSS-ICU)   Rolling 2/7   Supine to sit transfer 2/7   Unsupported sitting  4/7   Sit to stand transfers 1/7   Ambulation 0/7   Total  9/35     Therapeutic Exercises:     BLE ROM   LAQs B x10 reps AAROM   Hip marching B x10 reps AAROM    Patient education  Pt educated on safety during functional mobility, sitting balance/posture, spinal precautions     Patient response to education:   Pt verbalized understanding Pt demonstrated skill Pt requires further education in this area   No No  Yes      ASSESSMENT:    Comments:  Pt received supine and agreeable to PT evaluation. Pt cleared for participation by RN prior to session. Vitals monitored during session. Pt limited by cognition. Educated on spinal precautions. Requires assist to log roll and perform supine>sit. Poor participation d/t poor understanding of instructions. Sat EOB and performed LE exercises. Attempted STS but unable to achieve full standing despite max cueing and max assist.  Returned to sitting. Log rolled back to supine and scooted up. Pt left semi chair position in bed with call button in reach, lines attached, and needs met. Treatment:  Patient practiced and was instructed in the following treatment:     Bed mobility training - pt given verbal and tactile cues to facilitate proper sequencing and safety during log rolling and supine>sit as well as provided with physical assistance to complete task     Sitting EOB for >12 minutes for upright tolerance, postural awareness and BLE ROM   Lower extremity therapeutic exercises     Skilled positioning - Pt placed in the chair position with pillows utilized to facilitate upright posture, joint and skin integrity, and interaction with environment.  Non-pharmacological treatment and prevention of ICU delirium - Pt oriented to date, time, time of day, place, and situation as well as provided with visual and auditory stimuli in order to improve cognition and combat effects of ICU delirium. Pt's/ family goals   1.  None stated Patient and or family understand(s) diagnosis, prognosis, and plan of care. ? PLAN:    Current Treatment Recommendations     [] Strengthening     [] ROM   [] Balance Training   [x] Endurance Training   [x] Transfer Training   [x] Gait Training   [] Stair Training   [] Positioning   [x] Safety and Education Training   [] Patient/Caregiver Education   [] HEP  [] Other     Frequency of treatments: 2-5x/week x 1-2 weeks. Time in  0930  Time out  1005    Total Treatment Time  23 minutes     Evaluation Time includes thorough review of current medical information, gathering information on past medical history/social history and prior level of function, completion of standardized testing/informal observation of tasks, assessment of data and education on plan of care and goals.     CPT codes:  [] Low Complexity PT evaluation 56893  [x] Moderate Complexity PT evaluation 81921  [] High Complexity PT evaluation 32169  [] PT Re-evaluation 52277  [] Gait training 30476 -- minutes  [] Manual therapy 10845 -- minutes  [x] Therapeutic activities 30984 23 minutes  [] Therapeutic exercises 59698 - minutes  [] Neuromuscular reeducation 22324 -- minutes     Jeyson Fink, PT, DPT  [id number]

## 2020-09-12 PROBLEM — N17.9 AKI (ACUTE KIDNEY INJURY) (HCC): Status: ACTIVE | Noted: 2020-01-01

## 2020-09-12 PROBLEM — E87.0 HYPERNATREMIA: Status: ACTIVE | Noted: 2020-01-01

## 2020-09-12 NOTE — PROGRESS NOTES
Assessment/Plan:  Principal Problem:    Septic shock (HCC)  Active Problems:    Type 2 diabetes mellitus with stage 3 chronic kidney disease, with long-term current use of insulin (HCC)    Mixed hyperlipidemia    Hypothyroidism    History of hypertension    Falls frequently    UTI (urinary tract infection)    Hypernatremia    MARLENE (acute kidney injury) ([facility] [address]  Resolved Problems:    * No resolved hospital problems. *       Off vasopressors although currently on low-dose midodrine    Continue IV antibiotics for UTI with urosepsis    Wound culture growing lots of different bugs, but the wounds themselves do not look infected to me    Glucose not well controlled. Increase glargine and sliding scale doses    Continue current dose of stress dose steroids. As long as blood pressures are okay, can start weaning those tomorrow    Severe MARLENE is improving    Mild hypernatremia. Nephrology has added some free water boluses.   It is stable to slightly better    Requires continued inpatient level of care   Aurea Man    5:46 PM  9/12/2020  Cell: [phone]

## 2020-09-12 NOTE — PROGRESS NOTES
Comprehensive Nutrition Assessment    Type and Reason for Visit:  Initial, Positive Nutrition Screen(New TF)    Nutrition Recommendations/Plan: Continue NPO, Modify Tube Feeding as current order overfeeding:    Rec  Diabetic 1.2 @ 40 ml/hr x23 hrs hold for Synthroid + 1 protein modular daily. Will provide: 920 ml tv, 1104 kcals, 55 gm pro (1204 kcals & 81 gm pro w/ mod), 740 ml free water. Water flushes 150 ml q 6 hr= 1320 ml total water/ adjust for hypernatremia management prn     Nutrition Assessment:  Pt at nutritional risk d/t need for EN support 2/2 dyshpagia/AMS admit w/ sepsis/ recurrent UTI. Noted multiple DTI's. Will provide updated TF rec & continue to monitor. Malnutrition Assessment:  Malnutrition Status:  Insufficient data    Context:  Acute Illness     Findings of the 6 clinical characteristics of malnutrition:  Energy Intake:  (greater than 75% w/ TF)  Weight Loss:  No significant weight loss     Body Fat Loss:  Unable to assess   Muscle Mass Loss:  Unable to assess  Fluid Accumulation:  Unable to assess   Strength:  Not Performed    Estimated Daily Nutrient Needs:  Energy (kcal):  2070-8472; Weight Used for Energy Requirements:  Admission     Protein (g):  70-80(1.2-1.4 g/kg);  Weight Used for Protein Requirements:  Current        Fluid (ml/day):  8852-2407    Nutrition Related Findings:  AMS, +I/O's, trace edema, weakness, Abd WDL, dysphagia, NGT w/ TF      Wounds:  Multiple, Open Wounds, Deep Tissue Injury       Current Nutrition Therapies:    Current Tube Feeding (TF) Orders:  · Feeding Route: Nasogastric  · Formula: 1.5 Diabetic  · Schedule: Continuous  · Water Flushes: 200 ml q 6 hr= 800 ml water  · Current TF & Flush Orders Provides: 1800 kcals, 99 gm pro, 911 ml free water, 1711 ml total water w/ flushes    Anthropometric Measures:  · Height: 5' 5\" (165.1 cm)  · Current Body Weight: 135 lb (61.2 kg)(9/10 admit wt as CBW elevated d/t +fluids)   · Admission Body Weight: 135 lb (61.2 kg)(9/10 first measured)    · Usual Body Weight: 147 lb (66.7 kg)(1/13 EMR actual)     · Ideal Body Weight: 125 lbs; % Ideal Body Weight 108 %   · BMI: 22.5 BMI Categories: Normal Weight (BMI 22.0 to 24.9) age over 72       Nutrition Diagnosis:   · Inadequate oral intake related to cognitive or neurological impairment as evidenced by NPO or clear liquid status due to medical condition, nutrition support - enteral nutrition    Nutrition Interventions:   Nutrition Education/Counseling:  Education not indicated   Coordination of Nutrition Care:  Continued Inpatient Monitoring    Goals:  Pt to tolerate TF at goal rate       Nutrition Monitoring and Evaluation:   Food/Nutrient Intake Outcomes:  Enteral Nutrition Intake/Tolerance  Physical Signs/Symptoms Outcomes:  Biochemical Data, Nutrition Focused Physical Findings, Skin, Weight, GI Status, Fluid Status or Edema     Discharge Planning:     Too soon to determine     Electronically signed by Mulugeta Beverly RD, LD on 9/12/20 at 1:28 PM EDT    Contact: Ext [phone]

## 2020-09-12 NOTE — PROGRESS NOTES
Nephrology Progress Note  [facility]      CC:   arf    HPI:   The pt is an 81 yo female with a pmh of r breast cancer, htn, lbp, spinal stenosis, dm, dementia, cad who was admitted for ms changes and hypotension. She was started on levophed. She has ckd 3 with a baseline cr of 1.3. cr on admission was 6.6 and has improved to 3.3 so far. Other labs today show na 148 k 43.1 co2 18, vun 94, alb 2.4, wbc 19.1, hgb 10, plt 134. She has been started on a  Bicarb drip and free water. She is being treated for septic shock due to uti on cefepime. She is on iv steroids. april shows no hydro or stone. She did have a kyphoplasty in august. uo has been adequate. bp has been low. Her outpt lisinopril is on hold. Subjective    9/12: No new acute issue from overnight      Meds:     insulin glargine  30 Units Subcutaneous BID    insulin lispro  0-18 Units Subcutaneous Q6H    mupirocin   Nasal BID    hydrocortisone sodium succinate PF  100 mg Intravenous Q12H    sodium chloride flush  10 mL Intravenous 2 times per day    heparin (porcine)  5,000 Units Subcutaneous 3 times per day    cefepime  1 g Intravenous Q24H    levothyroxine  88 mcg Oral Daily    influenza virus vaccine  0.5 mL Intramuscular Prior to discharge    memantine  10 mg Oral BID    miconazole nitrate   Topical BID    midodrine  5 mg Oral TID WC        dextrose      IV infusion builder 100 mL/hr at 09/12/20 0554       Meds prn:     sodium chloride flush, acetaminophen **OR** acetaminophen, polyethylene glycol, traMADol, glucose, dextrose, glucagon (rDNA), dextrose, miconazole nitrate **AND** miconazole nitrate, trimethobenzamide    Meds prior to admission:     No current facility-administered medications on file prior to encounter. Current Outpatient Medications on File Prior to Encounter   Medication Sig Dispense Refill    traMADol (ULTRAM) 50 MG tablet Take 50 mg by mouth every 8 hours as needed for Pain.       insulin lispro, 1 Unit Dial, (HUMALOG KWIKPEN) 100 UNIT/ML SOPN If B-199 = 2u  200-249 = 4u  250-299 = 6u  300-349 = 8u  350-399 = 10u  400+ = 12u  Before meals and at bedtime      blood glucose test strips (ACCU-CHEK LEONARDO) strip 1 each by In Vitro route daily Test blood sugar daily      Wound Dressings (ALLEVYN ADHESIVE) PADS Apply 1 each topically every 3 days And prn 20 each 2    memantine (NAMENDA) 10 MG tablet TAKE 1 TABLET BY MOUTH TWICE DAILY 62 tablet 11    torsemide (DEMADEX) 5 MG tablet Take 1 tablet by mouth daily (Give 1 tablet twice daily until swelling resolved) 30 tablet 3    atorvastatin (LIPITOR) 20 MG tablet TAKE 1 TABLET BY MOUTH DAILY AT BEDTIME 90 tablet 10    levothyroxine (SYNTHROID) 88 MCG tablet TAKE ONE(1) TABLET BY MOUTH ONCE DAILY **CYC 90 tablet 10    GLUTOSE 15 40 % GEL USE 1 TUBE AS NEEDED 37.5 g 10    GLUCAGEN HYPOKIT 1 MG SOLR injection USE AS DIRECTED 1MG AS NEEDED 1 each 10    lisinopril (PRINIVIL;ZESTRIL) 2.5 MG tablet TAKE ONE(1) TABLET BY MOUTH ONCE DAILY 31 tablet 11    metoprolol succinate (TOPROL XL) 50 MG extended release tablet Take 1 tablet by mouth daily Do not crush or chew. 1 tablet 10    Blood Glucose Calibration (OT ULTRA/FASTTK CNTRL SOLN) SOLN   5    NOVOFINE AUTOCOVER 30G X 8 MM MISC USE AS DIRECTED TWICE DAILY.  60 each 11    Cholecalciferol (VITAMIN D3) 2000 units CAPS Take 1 capsule by mouth daily  11    acetaminophen (TYLENOL) 325 MG tablet Take 650 mg by mouth every 6 hours as needed for Pain or Fever      Melatonin Gummies 2.5 MG CHEW Take 2 each by mouth nightly      Ascorbic Acid (VITAMIN C) 500 MG tablet Take 500 mg by mouth 2 times daily      LANTUS SOLOSTAR 100 UNIT/ML injection pen INJECT 45 UNITS SUB-Q DAILY **DISCARD 28 DAYS AFTER OPENING* 12 mL 11    HUMALOG MIX 75/25 KWIKPEN (75-25) 100 UNIT/ML SUPN injection pen Inject 50 u SC q AM and 25 u SC q PM with dinner 30 pen 11       Allergies:    Darvon [propoxyphene hcl]; Pcn [penicillins]; and Propoxyphene    Social History:     reports that she has never smoked. She has never used smokeless tobacco. She reports that she does not drink alcohol or use drugs.     Family History:         Problem Relation Age of Onset    High Blood Pressure Mother     Kidney Disease Mother     Cancer Father         BONE & LUNG       ROS:     Unable to assess given mental status changes      Physical Exam:      Patient Vitals for the past 24 hrs:   BP Temp Temp src Pulse Resp SpO2 Height Weight   09/12/20 1600 (!) 148/63 98.2 °F (36.8 °C) Temporal 69 18 97 % -- --   09/12/20 1317 -- -- -- -- -- -- 5' 5\" (1.651 m) --   09/12/20 0815 (!) 98/50 98 °F (36.7 °C) Temporal 84 18 -- -- --   09/12/20 0600 -- -- -- -- -- -- -- 150 lb 3 oz (68.1 kg)   09/12/20 0000 104/66 98.6 °F (37 °C) -- 82 18 95 % -- --         Intake/Output Summary (Last 24 hours) at 9/12/2020 1738  Last data filed at 9/12/2020 0646  Gross per 24 hour   Intake 2760 ml   Output 675 ml   Net 2085 ml       General: Arouses easily; in no acute distress  Head: normocephalic, atraumatic   Neck: supple, no jvd  Cardiovascular: regular rate and rhythm, no murmurs, gallops, or rubs   Respiratory: decreased bases  Gastrointestinal: soft, nontender, nondistended, no hepatosplenomegaly  Ext: tr edema  Neuro: awake oriented to person  Skin: dry, no rash   Back: nontender    Data:    Recent Labs     09/10/20  0400 09/11/20 0455 09/12/20  0535   WBC 22.1* 19.1* 19.7*   HGB 11.2* 10.0* 9.8*   HCT 34.6 30.3* 30.0*   .9* 98.4 100.0*    134 115*       Recent Labs     09/10/20  0400  09/11/20  0110 09/11/20  0455 09/12/20  0535   *   < > 152* 148* 150*   K 4.2   < > 3.7 3.1* 3.1*   *   < > 118* 112* 113*   CO2 14*   < > 19* 18* 23   CREATININE 4.7*   < > 3.6* 3.3* 2.4*   *   < > 106* 94* 81*   LABGLOM 9   < > 12 13 19   GLUCOSE 159*   < > 299* 213* 235*   CALCIUM 8.1*   < > 7.9* 8.1* 8.1*   PHOS 4.3  --   --  2.9 2.1*   MG 2.2  --   --  2.1 1.9    <

## 2020-09-13 PROBLEM — E83.39 HYPOPHOSPHATASIA: Status: ACTIVE | Noted: 2020-01-01

## 2020-09-13 NOTE — PROGRESS NOTES
Nephrology Progress Note  [facility]      CC:   arf    HPI:   The pt is an 79 yo female with a pmh of r breast cancer, htn, lbp, spinal stenosis, dm, dementia, cad who was admitted for ms changes and hypotension. She was started on levophed. She has ckd 3 with a baseline cr of 1.3. cr on admission was 6.6 and has improved to 3.3 so far. Other labs today show na 148 k 43.1 co2 18, vun 94, alb 2.4, wbc 19.1, hgb 10, plt 134. She has been started on a  Bicarb drip and free water. She is being treated for septic shock due to uti on cefepime. She is on iv steroids. april shows no hydro or stone. She did have a kyphoplasty in august. uo has been adequate. bp has been low. Her outpt lisinopril is on hold. Subjective    9/12: No new acute issue from overnight  9/13: c/o feeling cold      Meds:     hydrocortisone sodium succinate PF  50 mg Intravenous Q12H    cefTRIAXone (ROCEPHIN) IV  1 g Intravenous Q24H    insulin glargine  20 Units Subcutaneous BID    insulin lispro  0-12 Units Subcutaneous Q6H    potassium & sodium phosphates  1 packet Per NG tube TID    mupirocin   Nasal BID    sodium chloride flush  10 mL Intravenous 2 times per day    heparin (porcine)  5,000 Units Subcutaneous 3 times per day    levothyroxine  88 mcg Oral Daily    influenza virus vaccine  0.5 mL Intramuscular Prior to discharge    memantine  10 mg Oral BID    miconazole nitrate   Topical BID    midodrine  5 mg Oral TID WC        0.45 % NaCl with KCl 20 mEq 50 mL/hr at 09/13/20 1339    dextrose         Meds prn:     sodium chloride flush, acetaminophen **OR** acetaminophen, polyethylene glycol, traMADol, glucose, dextrose, glucagon (rDNA), dextrose, miconazole nitrate **AND** miconazole nitrate, trimethobenzamide    Meds prior to admission:     No current facility-administered medications on file prior to encounter.       Current Outpatient Medications on File Prior to Encounter   Medication Sig Dispense Refill    traMADol (ULTRAM) 50 MG tablet Take 50 mg by mouth every 8 hours as needed for Pain.  insulin lispro, 1 Unit Dial, (HUMALOG KWIKPEN) 100 UNIT/ML SOPN If B-199 = 2u  200-249 = 4u  250-299 = 6u  300-349 = 8u  350-399 = 10u  400+ = 12u  Before meals and at bedtime      blood glucose test strips (ACCU-CHEK LEONARDO) strip 1 each by In Vitro route daily Test blood sugar daily      Wound Dressings (ALLEVYN ADHESIVE) PADS Apply 1 each topically every 3 days And prn 20 each 2    memantine (NAMENDA) 10 MG tablet TAKE 1 TABLET BY MOUTH TWICE DAILY 62 tablet 11    torsemide (DEMADEX) 5 MG tablet Take 1 tablet by mouth daily (Give 1 tablet twice daily until swelling resolved) 30 tablet 3    atorvastatin (LIPITOR) 20 MG tablet TAKE 1 TABLET BY MOUTH DAILY AT BEDTIME 90 tablet 10    levothyroxine (SYNTHROID) 88 MCG tablet TAKE ONE(1) TABLET BY MOUTH ONCE DAILY **CYC 90 tablet 10    GLUTOSE 15 40 % GEL USE 1 TUBE AS NEEDED 37.5 g 10    GLUCAGEN HYPOKIT 1 MG SOLR injection USE AS DIRECTED 1MG AS NEEDED 1 each 10    lisinopril (PRINIVIL;ZESTRIL) 2.5 MG tablet TAKE ONE(1) TABLET BY MOUTH ONCE DAILY 31 tablet 11    metoprolol succinate (TOPROL XL) 50 MG extended release tablet Take 1 tablet by mouth daily Do not crush or chew. 1 tablet 10    Blood Glucose Calibration (OT ULTRA/FASTTK CNTRL SOLN) SOLN   5    NOVOFINE AUTOCOVER 30G X 8 MM MISC USE AS DIRECTED TWICE DAILY.  60 each 11    Cholecalciferol (VITAMIN D3) 2000 units CAPS Take 1 capsule by mouth daily  11    acetaminophen (TYLENOL) 325 MG tablet Take 650 mg by mouth every 6 hours as needed for Pain or Fever      Melatonin Gummies 2.5 MG CHEW Take 2 each by mouth nightly      Ascorbic Acid (VITAMIN C) 500 MG tablet Take 500 mg by mouth 2 times daily      LANTUS SOLOSTAR 100 UNIT/ML injection pen INJECT 45 UNITS SUB-Q DAILY **DISCARD 28 DAYS AFTER OPENING* 12 mL 11    HUMALOG MIX 75/25 KWIKPEN (75-25) 100 UNIT/ML SUPN injection pen Inject 50 u SC q AM and 25 u SC LABALBU 2.4* 2.5* 2.5*       Iron   Date Value Ref Range Status   05/08/2012 99 50 - 170 mcg/dL Final     TIBC   Date Value Ref Range Status   03/09/2011 313 250 - 450 mcg/dL Final       Vitamin B-12   Date Value Ref Range Status   09/10/2020 657 211 - 946 pg/mL Final       Folate   Date Value Ref Range Status   09/10/2020 9.1 4.8 - 24.2 ng/mL Final         Lab Results   Component Value Date    COLORU Yellow 09/09/2020    LABSPEC 1.0112 01/04/2020    NITRU Negative 09/09/2020    GLUCOSEU Negative 09/09/2020    GLUCOSEU NEGATIVE 12/17/2010    KETUA Negative 09/09/2020    UROBILINOGEN 0.2 09/09/2020    BILIRUBINUR Negative 09/09/2020    BILIRUBINUR Negative 01/04/2020       No results found for: ZAHRA, CREURRAN, MACREATRATIO, OSMOU    No components found for: URIC    No results found for: LIPIDPAN      Assessment and Plan:    1. MARLENE stage 3  Baseline 1.3  Cr improved from 6.6 to 3.3 so far  april neg  fena >1 c/w atn  Will continue IVF    2. Metabolic acidosis  In setting of arf, sepis, hypotension  Now resolved on bicarb drip  IVF changed      3. Sepsis  Urinary source  abx per id  Continue ivf    4. Hypernatremia  Now resolved  Follow on free water    5.  Hypokalemia  Replace prn    Rozina Quintero MD

## 2020-09-13 NOTE — PROGRESS NOTES
Chief Complaint:  Chief Complaint   Patient presents with    Altered Mental Status     increased lethergy and poor appitite following a syncopal episode on monday. Septic shock (HCC)     Subjective:    She remains delirious and confused    Objective:    BP (!) 96/53   Pulse 65   Temp 98.4 °F (36.9 °C) (Temporal)   Resp 16   Ht 5' 5\" (1.651 m)   Wt 150 lb 3 oz (68.1 kg)   SpO2 97%   BMI 24.99 kg/m²     Current medications that patient is taking have been reviewed.     General appearance: Nontoxic very frail-appearing female in no acute distress  HEENT: AT/NC, MMM, NG tube in place  Neck: FROM, supple  Lungs: Clear to auscultation anteriorly  CV: RRR, no MRGs  Abdomen: Soft, non-tender; no masses or HSM, +BS  Extremities: No peripheral edema or digital cyanosis  Skin: No rashes/lesions visible, I didn't turn her today to examine the buttock wounds  Psych: Calm, but uncooperative if you try to move her  Neuro: Lethargic but awakes to voice and physical stimulation    Labs:  CBC:   Lab Results   Component Value Date    WBC 23.7 09/13/2020    RBC 3.16 09/13/2020    HGB 10.4 09/13/2020    HCT 30.9 09/13/2020    MCV 97.8 09/13/2020    MCH 32.9 09/13/2020    MCHC 33.7 09/13/2020    RDW 13.7 09/13/2020     09/13/2020    MPV 10.7 09/13/2020     CMP:    Lab Results   Component Value Date     09/13/2020    K 3.1 09/13/2020    K 3.7 09/11/2020     09/13/2020    CO2 28 09/13/2020    BUN 67 09/13/2020    CREATININE 1.7 09/13/2020    GFRAA 34 09/13/2020    LABGLOM 28 09/13/2020    GLUCOSE 45 09/13/2020    GLUCOSE 184 05/08/2012    PROT 4.7 09/13/2020    LABALBU 2.5 09/13/2020    LABALBU 4.3 05/08/2012    CALCIUM 7.7 09/13/2020    BILITOT 0.4 09/13/2020    ALKPHOS 118 09/13/2020    AST 29 09/13/2020    ALT 27 09/13/2020        Assessment/Plan:  Principal Problem:    Septic shock ([facility] [address]  Active Problems:    Type 2 diabetes mellitus with stage 3 chronic kidney disease, with long-term current use of insulin ([facility] [address]    Mixed hyperlipidemia    Hypothyroidism    History of hypertension    Falls frequently    UTI (urinary tract infection)    Hypernatremia    MARLENE (acute kidney injury) ([address]    Hypophosphatasia  Resolved Problems:    * No resolved hospital problems. *       Off vasopressors although currently on low-dose midodrine    Continue IV antibiotics for UTI with urosepsis    Wound culture growing lots of different bugs, but the wounds themselves did not look infected to me yesterday    Glucose getting a bit low after dose adjustments yesterday. Reduced doses.     Wean stress dose steroids today to hydrocortisone 50 mg BID    Severe MARLENE continues to improve    Mild hypernatremia improving    Replete K    Replete phos    Requires continued inpatient level of care   Marc Decker    2:59 PM  9/13/2020  Cell: [phone]

## 2020-09-14 NOTE — PROGRESS NOTES
will sign off at this time. Thank you.    9/11/2020  Patient was at [facility] for rehab. Found to have AMS and hypotension and brought to the hospital.  Spoke to patient's daughter Francisca Quinones at the bedside. Explained CODE STATUS in great detail. Daughter stated that she would probably pick DNR CCA, but her sister Randolph Hammans is the healthcare power of  and that I will need to confirm it with her. We spoke about the patient's dementia. Patient has had dementia for approximately 5 years. Patient was placed in assisted living care memory unit approximately 1.5 years ago according to [address] [place] [address] for safety issues with agitation. HC-POA has nursing background and knew it was time to place her mom where she could live safely. Within the last 6 months patient's po intake has decreased, daughter called multiple times with patient's complaint of increased pain and noncompliance with physical therapy. Patient had fallen, had kyphoplasty a month ago. Family has not been able to see patient very much since beginning of [facility]. Patient has been at [facility] for rehab. Explained CODE STATUS in great detail to [address] [place] [address]. [facility]. POA/Trish asked me to please change her mother to a DNR CCA at this time. Randolph Hammans is okay to for a PEG tube to be placed if needed for hydration and nutrition. Pros and cons of PEG tube were explained to [address] [place] [address]. Randolph Hammans does not want dialysis for her mother. Talked about the progression of end-stage dementia, and the potential consult for hospice at this time if needed. Patient's daughter is going to give her mother 50 to 72 hours to see how she does.      - Family Meeting:   [facility] and patient   Family meeting was held to discuss:Code status, goals of care, prior expressed wishes and discharge plan    -Surrogate/Legal NOK: Child, Extended Family: and HCPOA    Contacts:    Bo Ayon [address]  Katherine Camarillo [phone] sister  Ryan Christine [phone] Trish's       Past Medical History:   Diagnosis Date    Breast cancer ([facility] [address]     RIGHT BREAST MASTECTOMY    CAD (coronary artery disease)     Chronic back pain     Displacement of lumbar intervertebral disc without myelopathy     Forgetfulness     Hypertension     Influenza vaccine administered 03/2019    Memory change     Pneumococcal vaccine administered 03/2019    Spinal stenosis, lumbar region, without neurogenic claudication     Type II or unspecified type diabetes mellitus without mention of complication, not stated as uncontrolled        Past Surgical History:   Procedure Laterality Date    BREAST LUMPECTOMY  4/14/10    CORONARY ARTERY BYPASS GRAFT  2009    QUADRUPLE BYPASS    EYE SURGERY      HYSTERECTOMY  1987    KYPHOSIS SURGERY N/A 8/3/2020    KYPHOPLASTY T12, EPIDURAL INJECTION performed by Quinten Flores MD at [address]  5/6/10       Family History   Problem Relation Age of Onset    High Blood Pressure Mother     Kidney Disease Mother     Cancer Father         BONE & LUNG       Social history:  [place] status: no  Marital status:   Living status: nursing home   Work history: retired     Allergies   Allergen Reactions    Darvon [Propoxyphene Hcl]     Pcn [Penicillins]     Propoxyphene        ROS: UNLESS STATED PATIENT DENIES:  CONSTITUTIONAL:  fever, chill, rigors, nausea, vomiting, fatigue. HEENT: blurry vision, double vision, hearing problem, tinnitus, hoarseness, dysphagia, odynophagia  RESPIRATORY: cough, shortness of breath, sputum expectoration. CARDIOVASCULAR:  Chest pain/pressure, palpitation, syncope, irregular beats  GASTROINTESTINAL:  abdominal or rectal pain, diarrhea, constipation, . GENITOURINARY:  Burning, frequency, urgency, incontinence, discharge  INTEGUMENTARY: rash, wound, pruritis  HEMATOLOGIC/LYMPHATIC:  Swelling, sores, gum bleeding, easy bruising, pica.   MUSCULOSKELETAL:  pain, edema, joint swelling or redness  NEUROLOGICAL:  light headed, dizziness, loss of consciousness, weakness, change in memory, seizures, tremors          Objective:     Physical Exam  BP (!) 117/55   Pulse 62   Temp 96.5 °F (35.8 °C) (Temporal)   Resp 16   Ht 5' 5\" (1.651 m)   Wt 150 lb 3 oz (68.1 kg)   SpO2 97%   BMI 24.99 kg/m²     Gen:  Alert, appears stated age, mal nourished, in no acute distress  HEENT:  Normocephalic, conjunctiva pink, no drainage, mucosa dry, poor dentition   Neck:  Supple  Lungs:  Diminished bilaterally, no audible rhonchi or wheezes noted, RA  Heart[de-identified]  RRR, no murmur, rub, or gallop noted during exam  Abd:  Soft, non tender, non distended, BS+  :  Catheter- sediment   Ext:  Moving all extremities, no edema, pulses present, R femoral TLC   Skin:  Warm and dry, wound care consulted  Neuro:  PERRL, Alert, oriented person and place ; follows simple commands        Current Medications:  Inpatient medications reviewed: yes  Home Medications reviewed: yes    Results/Verification of Data Review  Objective data reviewed: labs, images, records, medication use, vitals and chart      - Advanced Directives: Living Will and HC-POA- daughter [facility]    -  - Spiritual assessment: No spiritual distress identified     - Bereavement and grief: to be determined    - Discharge planning: discharge to ECF    - Prognosis: depends upon goals    - Referrals to: none today    Time/Communication  Greater than 51% of time spent, total 10 minutes in counseling and coordination of care at the bedside regarding Code status discussion . Assessment/Plan   1. Continue current treatment   2. Wound care-Bilateral heels, L posterior lower leg, DTI coccyx; preventative care, foam dressings, antifungal    3. Nephrology- MARLENE/CKD - appreciate recommendations   4. CM-Transition of care, from [facility]  5. Palliative Care- Code status DNR-CCA, PEG ok for hydration / nutrition, NO dialysis.        Nicole Paulson APRN-CNP, AGACNP-BC  Palliative Medicine    Discussed patient and the plan of care with the other IDT members of Palliative Care, and with consultants, Primary Attending, patient, family and floor nurse. Thank you for allowing Palliative Medicine to participate in the care of Meenu Bruce.    Note: This report was completed using computerLaunchPoint voiced recognition software. Every effort has been made to ensure accuracy; however, inadvertent computerized transcription errors may be present.

## 2020-09-14 NOTE — PROGRESS NOTES
Physical Therapy    Physical Therapy Treatment note    Name: Marie Alcala  : 1933  MRN: [id number]    Referring Provider:  Jennifer Cerrato MD    Date of Service: 2020    Evaluating PT:  Dea Crosss, PT, DPT [id number]     Room #:  3971/8120-V  Diagnosis:  Septic shock   PMHx/PSHx:  Breast CA with R breast mastectomy, CAD, chronic back pain, displacement of lumbar intervertebral disc without myelopathy, HTN, spinal stenosis, type II DM, kyphoplasty with stable T12 compression fracture recent admission   Precautions:  Fals, Spinal precautions, NGT, Cognition   Equipment Needs:  NA    SUBJECTIVE:    Pt admitted from [facility]. Unable to provide social hx or PLOF d/t cognition. OBJECTIVE:   Initial Evaluation  Date: 20 Treatment   Short Term/ Long Term   Goals   AM-PAC 6 Clicks     Was pt agreeable to Eval/treatment? Yes yes    Does pt have pain? Pain in BLE during mobility  no    Bed Mobility  Rolling: Max A  Supine to sit: Max A   Sit to supine: Max A x2  Scooting: Max A x2 Rolling max  supin to sit max   Sit to supine nt  Scooting max assist Rolling: Min A  Supine to sit: Min A  Sit to supine: Min A  Scooting: Min A   Transfers Sit to stand: Max A x2 -- unable   Stand to sit: Max A x2  Stand pivot: NT Sit to stand 1st time max 2   2nd time max 1  Stand to sit max  Stand pivot mod assist   Stand to sit - Sits abruptly fatigue  Max . Poor safety   Sit to stand: Mod A  Stand to sit: Mod A  Stand pivot: Mod A with [employer]    Ambulation    NT NT  >15 feet with [employer] Mod A    Stair negotiation: ascended and descended  NT NT NA   ROM BUE:  Per OT eval  BLE:  WFL     Strength BUE:  Per OT eval   BLE:  Grossly 2/5     Balance Sitting EOB:  Min A  Static Standing: Max A x2 Sitting eob sba  Static standing ww mod to max assist  Sitting EOB:  SBA  Dynamic Standing:   Mod A     Pt is A & O x 3     Therapeutic Exercises:     BLE ROM   LAQs B x10 reps AAROM   Ankle pumps     Patient education  Pt educated on  Rolling and spinal precautions and transfers     Patient response to education:   Pt verbalized understanding Pt demonstrated skill Pt requires further education in this area   yes yes Yes      ASSESSMENT:    Comments:    Pt in bed upon arrival and dtr present. Pt transferred to her side via log roll with max assist.   Performed sit to stand with max of 2 initially and max encouragement. Pt somewhat anxious. Pt educated on ue usage and on second time pt stood with decrease assist.  Pt performed stand pivot with max cues and she fatigued quickly and sat abruptly. Dtr is nurse and is going to sit with pt up in the chair. Educated on spinal precautions and function as per above. Treatment:  Patient practiced and was instructed in the following treatment:     Bed mobility training - pt given verbal and tactile cues to facilitate proper sequencing and safety during log rolling and supine>sit as well as provided with physical assistance to complete task     Sitting EOB for > 10  minutes for upright tolerance, postural awareness and BLE ROM   Lower extremity therapeutic exercises      Transfers training   Pt family education      Pt's/ family goals   1. None stated     Patient and or family understand(s) diagnosis, prognosis, and plan of care. ? PLAN:    Current Treatment Recommendations     [] Strengthening     [] ROM   [] Balance Training   [x] Endurance Training   [x] Transfer Training   [x] Gait Training   [] Stair Training   [] Positioning   [x] Safety and Education Training   [] Patient/Caregiver Education   [] HEP  [] Other     Frequency of treatments: 2-5x/week x 1-2 weeks.     Time in  140  Time out  210    Total Treatment Time  30 minutes     CPT codes:  [] Low Complexity PT evaluation 76392  [] Moderate Complexity PT evaluation 98156  [] High Complexity PT evaluation 23062  [] PT Re-evaluation 41115  [] Gait training 25876 -- minutes  [] Manual therapy 23250 -- minutes  [x] Therapeutic activities 29615 30 minutes  [] Therapeutic exercises 36016 - minutes  [] Neuromuscular reeducation 41122 -- minutes     Jennifer Ruffin, PTA  [id number]

## 2020-09-14 NOTE — PROGRESS NOTES
Nephrology Progress Note  [facility]      CC:   arf    HPI:   The pt is an 81 yo female with a pmh of r breast cancer, htn, lbp, spinal stenosis, dm, dementia, cad who was admitted for ms changes and hypotension. She was started on levophed. She has ckd 3 with a baseline cr of 1.3. cr on admission was 6.6 and has improved to 3.3 so far. Other labs today show na 148 k 43.1 co2 18, vun 94, alb 2.4, wbc 19.1, hgb 10, plt 134. She has been started on a  Bicarb drip and free water. She is being treated for septic shock due to uti on cefepime. She is on iv steroids. april shows no hydro or stone. She did have a kyphoplasty in august. uo has been adequate. bp has been low. Her outpt lisinopril is on hold. Subjective    9/12: No new acute issue from overnight  9/13: c/o feeling cold  9/14: pt seen in room, no cp or sob, no acute distress      Meds:     [START ON 9/15/2020] hydrocortisone sodium succinate PF  50 mg Intravenous Daily    cefTRIAXone (ROCEPHIN) IV  1 g Intravenous Q24H    insulin glargine  20 Units Subcutaneous BID    insulin lispro  0-12 Units Subcutaneous Q6H    potassium & sodium phosphates  1 packet Per NG tube TID    mupirocin   Nasal BID    sodium chloride flush  10 mL Intravenous 2 times per day    heparin (porcine)  5,000 Units Subcutaneous 3 times per day    levothyroxine  88 mcg Oral Daily    influenza virus vaccine  0.5 mL Intramuscular Prior to discharge    memantine  10 mg Oral BID    miconazole nitrate   Topical BID    midodrine  5 mg Oral TID WC        0.45 % NaCl with KCl 20 mEq 50 mL/hr at 09/14/20 0159    dextrose         Meds prn:     sodium chloride flush, acetaminophen **OR** acetaminophen, polyethylene glycol, traMADol, glucose, dextrose, glucagon (rDNA), dextrose, miconazole nitrate **AND** miconazole nitrate, trimethobenzamide    Meds prior to admission:     No current facility-administered medications on file prior to encounter.       Current Outpatient Medications on File Prior to Encounter   Medication Sig Dispense Refill    traMADol (ULTRAM) 50 MG tablet Take 50 mg by mouth every 8 hours as needed for Pain.  insulin lispro, 1 Unit Dial, (HUMALOG KWIKPEN) 100 UNIT/ML SOPN If B-199 = 2u  200-249 = 4u  250-299 = 6u  300-349 = 8u  350-399 = 10u  400+ = 12u  Before meals and at bedtime      blood glucose test strips (ACCU-CHEK LEONARDO) strip 1 each by In Vitro route daily Test blood sugar daily      Wound Dressings (ALLEVYN ADHESIVE) PADS Apply 1 each topically every 3 days And prn 20 each 2    memantine (NAMENDA) 10 MG tablet TAKE 1 TABLET BY MOUTH TWICE DAILY 62 tablet 11    torsemide (DEMADEX) 5 MG tablet Take 1 tablet by mouth daily (Give 1 tablet twice daily until swelling resolved) 30 tablet 3    atorvastatin (LIPITOR) 20 MG tablet TAKE 1 TABLET BY MOUTH DAILY AT BEDTIME 90 tablet 10    levothyroxine (SYNTHROID) 88 MCG tablet TAKE ONE(1) TABLET BY MOUTH ONCE DAILY **CYC 90 tablet 10    GLUTOSE 15 40 % GEL USE 1 TUBE AS NEEDED 37.5 g 10    GLUCAGEN HYPOKIT 1 MG SOLR injection USE AS DIRECTED 1MG AS NEEDED 1 each 10    lisinopril (PRINIVIL;ZESTRIL) 2.5 MG tablet TAKE ONE(1) TABLET BY MOUTH ONCE DAILY 31 tablet 11    metoprolol succinate (TOPROL XL) 50 MG extended release tablet Take 1 tablet by mouth daily Do not crush or chew. 1 tablet 10    Blood Glucose Calibration (OT ULTRA/FASTTK CNTRL SOLN) SOLN   5    NOVOFINE AUTOCOVER 30G X 8 MM MISC USE AS DIRECTED TWICE DAILY.  60 each 11    Cholecalciferol (VITAMIN D3) 2000 units CAPS Take 1 capsule by mouth daily  11    acetaminophen (TYLENOL) 325 MG tablet Take 650 mg by mouth every 6 hours as needed for Pain or Fever      Melatonin Gummies 2.5 MG CHEW Take 2 each by mouth nightly      Ascorbic Acid (VITAMIN C) 500 MG tablet Take 500 mg by mouth 2 times daily      LANTUS SOLOSTAR 100 UNIT/ML injection pen INJECT 45 UNITS SUB-Q DAILY **DISCARD 28 DAYS AFTER OPENING* 12 mL 11    HUMALOG MIX 75/25 Status   12/01/2015 97 (H) 15 - 65 pg/mL Final       Recent Labs     09/12/20  0535 09/13/20  0729 09/14/20  0643   ALT 17 27 60*   AST 21 29 68*   ALKPHOS 101 118* 136*   BILITOT 0.3 0.4 0.2       Recent Labs     09/12/20  0535 09/13/20  0729 09/14/20  0643   LABALBU 2.5* 2.5* 2.3*       Iron   Date Value Ref Range Status   05/08/2012 99 50 - 170 mcg/dL Final     TIBC   Date Value Ref Range Status   03/09/2011 313 250 - 450 mcg/dL Final       Vitamin B-12   Date Value Ref Range Status   09/10/2020 657 211 - 946 pg/mL Final       Folate   Date Value Ref Range Status   09/10/2020 9.1 4.8 - 24.2 ng/mL Final         Lab Results   Component Value Date    COLORU Yellow 09/09/2020    LABSPEC 1.0112 01/04/2020    NITRU Negative 09/09/2020    GLUCOSEU Negative 09/09/2020    GLUCOSEU NEGATIVE 12/17/2010    KETUA Negative 09/09/2020    UROBILINOGEN 0.2 09/09/2020    BILIRUBINUR Negative 09/09/2020    BILIRUBINUR Negative 01/04/2020       No results found for: ZAHRA, CREURRAN, MACREATRATIO, OSMOU    No components found for: URIC    No results found for: LIPIDPAN      Assessment and Plan:    1. MARLENE stage 3  Baseline 1.3  Cr improved from 6.6 to 1.6 so far  paril neg  fena >1 c/w atn  Will continue IVF 1/2 ns 20 k at 50    2. Metabolic acidosis  In setting of arf, sepis, hypotension  Now resolved off bicarb drip    3. Sepsis  Urinary source  abx per id  Continue ivf    4. Hypernatremia  Now resolved  Follow on free water    5.  Hypokalemia  Replace prn    Teri Oconnell MD

## 2020-09-14 NOTE — CARE COORDINATION
Met with the pt's daughter, Dianne Peters [facility]), at the bedside. The pt has been at [facility] for 1 1/2 years. She had been private pay until she had surgery last lillian and was there skilled. They would like to have hospice services. They would like to change facilities to [address] [place] [address] or [facility]. Referral made to [facility]. The pt is having another swallow study. Explained that normally hospice patients do not have tube feedings. She said that her mother has advanced directives and did not want tube feedings. Explained that we need choices for hospice. Will follow up. Dianne Peters will be out of town, but her sister, Sajan Barba, will be here.  Audra King RN [phone]

## 2020-09-14 NOTE — PROGRESS NOTES
Occupational Therapy  OT BEDSIDE TREATMENT NOTE      Date:2020  Patient Name: Luly Woods  MRN: [id number]  : 1933  Room: [address] [place] [address]-A     Referring Provider: Brian Peng MD      Evaluating OT: Mitch Buckley OTR/L #[id number]        AM-PAC Daily Activity Raw Score: 10/24     Recommended Adaptive Equipment: TBD         Diagnosis: Septic shock ([facility] [address] [A41.9, R65.21]  Septic shock ([facility] [address] [A41.9, R65.21]     Reason for admission: AMS/hypotensive     Surgery/Procedure: none     Pertinent Medical History: breast cancer, CAD, chronic back pain, HTN, spinal stenosis, type 2 DM, recent kyphoplasty 1 month ago        Precautions:  Falls, NGT, spinal precautions(recent kypho 1 month ago), PEG     Home Living: Pt admitted from [facility] per chart     Prior Level of Function: Assistance with ADLs; Assistance with IADLs. Pt reported no AD for ambulation. Driving: No  Occupation: n/a     Pain Level: No pain reported at this time        Cognition: A&O: 2/4(oriented to self and place) Follows 1 step commands               Memory: poor              Comprehension: fair-              Problem solving: fair-              Judgement/safety: fair-                 Communication skills: wfl              Vision: wfl                     Glasses:yes, not present                                                        Hearing: wfl                   Functional Assessment    Initial Eval Status  Date: 20 Treatment Status  Date: 20 STG=LTG  5-14  days    Feeding PEG  NPO                       Continue to assess      Grooming Mod. A  Min A  To comb hair while seated EOB. Unable to complete functional mobility to bathroom at this time                       Supervision/set-up   while seated in bedside chair (updated by Mitch Buckley OTR/L #[id number])      UB dressing Mod. A Mod A  Per last tx                        Min.  A         LB dressing Dep  Dependent  To don/doff socks seated EOB Max A   using AE as needed for safe reach/ energy conservation     Bathing Max A (simulated)  Max A  simulated                       Mod. A   using AE as needed for safe reach/ energy conservation        Toileting Dep Dependent                        Max A      Bed Mobility  Supine to sit: Max A (log roll)     Sit to supine: Max A x2 (log roll) Max A- supine to sit  Educated pt on log rolling technique to increase independence.                          Mod. A  in prep of ADL tasks & transfers   Functional Transfers Sit to stand: Max A x2 (unable to complete full sit to stand)     Stand to sit: Max A x2  Max A x 2  Max A x 2 for sit to stand for first stand. Improved to Max A for second stand. Cuing for hand placement                        Mod. A  sit<>stand/functional bathroom transfers using AD/DME as needed for balance and safety   Functional Mobility NT Mod A- stand pivot transfer  Using w/w                       Mod A w/ ww   functional/bathroom mobility using AD as needed & demonstrating good safety      Balance Sitting:     Static:  SBA    Dynamic:Min. A  Standing: Max A x2  Sitting:     Static:  SBA    Dynamic:Min. A  Standing: Max A   independent dynamic sitting balance; Mod. A dynamic standing balance  during ADL tasks & transfers   Endurance/Activity Tolerance    Poor+ tolerance with light activity. Pt sat EOB for >5 minutes incorporating light functional reaching/activity tolerance for ADLs Poor+  Fair+   tolerance with light/moderate activity/self care routine   Visual/  Perceptual WFL                                 Comments: Upon arrival pt supine in bed. Pt educated on adaptive techniques to increase independence and safety during ADL's, bed mobility, and functional transfers while maintaining precautions. At end of session pt left seated in bedside chair, call light within reach, daughter present. · Pt has made fair progress towards set goals.      · Continue with current plan of care    Treatment Time In: 1:40

## 2020-09-14 NOTE — PROGRESS NOTES
Subjective: The patient is awake and  answering questions     Objective:    BP (!) 117/55   Pulse 62   Temp 96.5 °F (35.8 °C) (Temporal)   Resp 16   Ht 5' 5\" (1.651 m)   Wt 150 lb 3 oz (68.1 kg)   SpO2 97%   BMI 24.99 kg/m²     In: 2062 [I.V.:1142; NG/GT:920]  Out: 950     HEENT: NG tube in place for nutrition NCAT,  PERRLA, No JVD  Heart:  RRR, no murmurs, gallops, or rubs.   Lungs:  CTA bilaterally, no wheeze, rales or rhonchi  Abd: bowel sounds present, nontender, nondistended, no masses  Extrem:  No clubbing, cyanosis, or edema     Recent Labs     09/12/20  0535 09/13/20  0729 09/14/20  0643   WBC 19.7* 23.7* 24.1*   HGB 9.8* 10.4* 10.0*   HCT 30.0* 30.9* 30.7*   * 123* 103*       Recent Labs     09/13/20  0729 09/13/20  1800 09/14/20  0643   * 144 143   K 3.1* 4.7 4.1   * 108* 105   CO2 28 26 28   BUN 67* 66* 62*   CREATININE 1.7* 1.6* 1.5*   CALCIUM 7.7* 7.4* 7.2*       Assessment:    Patient Active Problem List   Diagnosis    Spinal stenosis, lumbar region, without neurogenic claudication    Displacement of lumbar intervertebral disc without myelopathy    L1 vertebral fracture (HCC)    T12 compression fracture (HCC)    Cerebral atherosclerosis    Type 2 diabetes mellitus with stage 3 chronic kidney disease, with long-term current use of insulin (HCC)    Mixed hyperlipidemia    Hypothyroidism    Vitamin D deficiency    History of hypertension    Coronary arteriosclerosis    Late onset Alzheimer's disease with behavioral disturbance (HCC)    Recurrent UTI    CKD (chronic kidney disease) stage 4, GFR 15-29 ml/min ([facility])    Falls frequently    Thoracic compression fracture, closed, initial encounter ([facility] [address]    Fall at home, sequela    Compression fracture of body of thoracic vertebra (HCC)    Septic shock ([facility] [address]    UTI (urinary tract infection)    Hypernatremia    MARLENE (acute kidney injury) ([facility] [address]    Hypophosphatasia       Plan:    Admit to ICU for Sepsis shock from

## 2020-09-14 NOTE — PROGRESS NOTES
reviewed prior to initiation of this evaluation.      ADMITTING DIAGNOSIS: Septic shock ([facility]) [A41.9, R65.21]  Septic shock ([facility] [A41.9, R65.21]     ACTIVE PROBLEM LIST:   Patient Active Problem List   Diagnosis    Spinal stenosis, lumbar region, without neurogenic claudication    Displacement of lumbar intervertebral disc without myelopathy    L1 vertebral fracture ([facility])    T12 compression fracture ([facility])    Cerebral atherosclerosis    Type 2 diabetes mellitus with stage 3 chronic kidney disease, with long-term current use of insulin ([facility])    Mixed hyperlipidemia    Hypothyroidism    Vitamin D deficiency    History of hypertension    Coronary arteriosclerosis    Late onset Alzheimer's disease with behavioral disturbance ([facility])    Recurrent UTI    CKD (chronic kidney disease) stage 4, GFR 15-29 ml/min ([facility])    Falls frequently    Thoracic compression fracture, closed, initial encounter ([facility]    Fall at home, sequela    Compression fracture of body of thoracic vertebra ([address]    Septic shock ([address]    UTI (urinary tract infection)    Hypernatremia    MARLENE (acute kidney injury) ([address]    Hypophosphatasia

## 2020-09-15 NOTE — PROGRESS NOTES
Call placed to Dr Anne Munoz regarding pt pulling out NG tube and failed attempts to reinsert. Waiting on return call for further orders.

## 2020-09-15 NOTE — DISCHARGE INSTR - COC
Continuity of Care Form    Patient Name: Bayron Painter   :  1933  MRN:  [id number]    Admit date:  2020  Discharge date:  2020      Code Status Order: DNR-CCA   Advance Directives:   [facility] Documentation       Date/Time Healthcare Directive Type of Healthcare Directive Copy in [address] Agent's Name Healthcare Agent's Phone Number    20 1730  Yes, patient has an advance directive for healthcare treatment  --  --  --  --  --            Admitting Physician:  Rajinder Johnston MD  PCP: Néstor Monte MD    Discharging Nurse: [facility]/Room#: 9028/2769-K  Discharging Unit Phone Number: [phone]    Emergency Contact:   Extended Emergency Contact Information  Primary Emergency Contact: Ken Bernal PAZ  Address: [id number] [place] [address] Phone: [phone]  Relation: Child  Secondary Emergency Contact: Kristopher Deleon  Address: [address] [place] [address] Phone: [phone]  Relation: Child    Past Surgical History:  Past Surgical History:   Procedure Laterality Date    BREAST LUMPECTOMY  4/14/10    CORONARY ARTERY BYPASS GRAFT  2009    QUADRUPLE BYPASS    EYE SURGERY      HYSTERECTOMY  1987    KYPHOSIS SURGERY N/A 8/3/2020    KYPHOPLASTY T12, EPIDURAL INJECTION performed by Ronnie Urbano MD at [facility]  5/6/10       Immunization History:   Immunization History   Administered Date(s) Administered    Influenza, Triv, inactivated, subunit, adjuvanted, IM (Fluad 65 yrs and older) 10/28/2019    Pneumococcal Polysaccharide ([id number]) 10/28/2019       Active Problems:  Patient Active Problem List   Diagnosis Code    Spinal stenosis, lumbar region, without neurogenic claudication M48.061    Displacement of lumbar intervertebral disc without myelopathy M51.26    L1 vertebral fracture (HCC) S32.019A    T12 compression fracture ([facility] S22.080A    Cerebral atherosclerosis I67.2    Type 2 diabetes mellitus with stage 3 chronic kidney disease, with long-term current use of insulin ([facility]) E11.22, N18.3, Z79.4    Mixed hyperlipidemia E78.2    Hypothyroidism E03.9    Vitamin D deficiency E55.9    History of hypertension Z86.79    Coronary arteriosclerosis I25.10    Late onset Alzheimer's disease with behavioral disturbance ([facility]) G30.1, F02.81    Recurrent UTI N39.0    CKD (chronic kidney disease) stage 4, GFR 15-29 ml/min ([facility]) N18.4    Falls frequently R29.6    Thoracic compression fracture, closed, initial encounter ([facility] S22.000A    Fall at home, sequela W19. 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       Isolation/Infection:   Isolation            No Isolation          Patient Infection Status       Infection Onset Added Last Indicated Last Indicated By Review Planned Expiration Resolved Resolved By    None active    Resolved    MRSA 09/10/20 09/11/20 09/10/20 Culture, MRSA, Screening   09/11/20 Trino Haas RN    COVID-19 Rule Out 09/09/20 09/09/20 09/09/20 COVID-19 (Ordered)   09/09/20 Rule-Out Test Resulted    COVID-19 Rule Out 09/08/20 09/08/20 09/08/20 Covid-19 Ambulatory (Ordered)   09/09/20 Rule-Out Test Resulted    COVID-19 Rule Out 09/01/20 09/01/20 09/01/20 Covid-19 Ambulatory (Ordered)   09/03/20 Rule-Out Test Resulted    COVID-19 Rule Out 08/13/20 08/13/20 08/13/20 Covid-19 Ambulatory (Ordered)   08/16/20 Rule-Out Test Resulted    COVID-19 Rule Out 07/29/20 07/29/20 07/29/20 Covid-19 Ambulatory (Ordered)   07/30/20 Rule-Out Test Resulted            Nurse Assessment:  Last Vital Signs: BP (!) 131/58   Pulse 65   Temp 97.1 °F (36.2 °C) (Temporal)   Resp 18   Ht 5' 5\" (1.651 m)   Wt 152 lb 4 oz (69.1 kg)   SpO2 93%   BMI 25.34 kg/m²     Last documented pain score (0-10 scale): Pain Level: 0  Last Weight:   Wt Readings from Last 1 Encounters:   09/15/20 152 lb 4 oz (69.1 kg)     Mental Status:  Alert to self only    IV Access:  - None    Nursing Mobility/ADLs:  Walking   Dependent  Transfer  Assisted  Bathing  Assisted  Dressing  Assisted  Toileting  Dependent  Feeding  Assisted  Med Admin  Assisted  Med Delivery   crushed    Wound Care Documentation and Therapy:  Wound 08/01/20 Heel Left;Medial (Active)   Wound Deep tissue/Injury 09/10/20 2000   Dressing Status Clean;Dry; Intact 09/15/20 0800   Dressing/Treatment Foam 09/15/20 0800   Wound Length (cm) 3.2 cm 09/10/20 2000   Wound Width (cm) 3 cm 09/10/20 2000   Wound Depth (cm) 0.1 cm 09/10/20 0100   Wound Surface Area (cm^2) 9.6 cm^2 09/10/20 2000   Change in Wound Size % (l*w) 78.18 09/10/20 2000   Wound Volume (cm^3) 4.4 cm^3 09/10/20 0100   Wound Assessment Non-blanchable erythema 09/10/20 1800   Drainage Amount None 09/10/20 1800   Farzaneh-wound Assessment Intact 09/10/20 1330   Purple%Wound Bed 100 09/10/20 1330   Number of days: 45       Wound 08/01/20 Arm Distal;Left;Lower;Dorsal (Active)   Number of days: 45       Wound 08/01/20 Coccyx (Active)   Wound Image   09/10/20 1330   Wound Deep tissue/Injury 09/11/20 0800   Dressing/Treatment Open to air 09/15/20 0800   Wound Cleansed Soap and water 09/10/20 0100   Wound Length (cm) 4 cm 09/10/20 2000   Wound Width (cm) 4 cm 09/10/20 2000   Wound Depth (cm) 0.1 cm 09/10/20 2000   Wound Surface Area (cm^2) 16 cm^2 09/10/20 2000   Change in Wound Size % (l*w) -416.13 09/10/20 2000   Wound Volume (cm^3) 1.6 cm^3 09/10/20 2000   Wound Healing % -416 09/10/20 2000   Wound Assessment Red;Fragile;Painful 09/15/20 0800   Drainage Amount None 09/15/20 0800   Drainage Description Serosanguinous 09/10/20 1800   Odor None 09/10/20 1330   Farzaneh-wound Assessment Intact 09/10/20 1330   Red%Wound Bed 50 09/10/20 1330 Purple%Wound Bed 50 09/10/20 1330   Number of days: 45       Wound 09/10/20 Heel Right (Active)   Wound Image   09/10/20 1330   Wound Deep tissue/Injury 09/10/20 1330   Dressing Status Clean;Dry; Intact 09/15/20 0800   Dressing/Treatment Foam 09/15/20 0800   Wound Length (cm) 1 cm 09/10/20 2000   Wound Width (cm) 1 cm 09/10/20 2000   Wound Depth (cm) 0.1 cm 09/10/20 0100   Wound Surface Area (cm^2) 1 cm^2 09/10/20 2000   Change in Wound Size % (l*w) 83.33 09/10/20 2000   Wound Volume (cm^3) 0.6 cm^3 09/10/20 0100   Wound Assessment Non-blanchable erythema 09/11/20 1849   Drainage Amount None 09/11/20 1849   Farzaneh-wound Assessment Blanchable erythema 09/11/20 1849   Purple%Wound Bed 100 09/10/20 1330   Number of days: 5       Wound 09/10/20 Leg Right;Posterior; Lower (Active)   Wound Image   09/10/20 1330   Dressing/Treatment Open to air 09/15/20 0800   Wound Length (cm) 1.4 cm 09/10/20 2000   Wound Width (cm) 5 cm 09/10/20 2000   Wound Depth (cm) 0.1 cm 09/10/20 2000   Wound Surface Area (cm^2) 7 cm^2 09/10/20 2000   Change in Wound Size % (l*w) -133.33 09/10/20 2000   Wound Volume (cm^3) 0.7 cm^3 09/10/20 2000   Wound Healing % -133 09/10/20 2000   Wound Assessment Non-blanchable erythema 09/15/20 0800   Drainage Amount None 09/15/20 0800   Farzaneh-wound Assessment Intact 09/15/20 0800   Purple%Wound Bed 100 09/10/20 1330   Number of days: 5       Wound 09/10/20 Heel Left;Lateral (Active)   Wound Image   09/10/20 1330   Wound Deep tissue/Injury 09/10/20 2000   Dressing Status Clean;Dry; Intact 09/15/20 0800   Dressing/Treatment Foam 09/15/20 0800   Wound Length (cm) 0.8 cm 09/10/20 2000   Wound Width (cm) 1.4 cm 09/10/20 2000   Wound Depth (cm) 0.1 cm 09/10/20 0100   Wound Surface Area (cm^2) 1.12 cm^2 09/10/20 2000   Change in Wound Size % (l*w) 66.06 09/10/20 2000   Wound Volume (cm^3) 0.33 cm^3 09/10/20 0100   Wound Assessment Non-blanchable erythema 09/10/20 1800   Drainage Amount None 09/10/20 1800   Farzaneh-wound Assessment Intact 09/10/20 1330   Purple%Wound Bed 100 09/10/20 1330   Number of days: 5        Elimination:  Continence:   · Bowel: No  · Bladder: No  Urinary Catheter: Inserted 09/09/2020 Keep in per [address] for comfort care  Colostomy/Ileostomy/Ileal Conduit: No       Date of Last BM: 9/16/2020      Intake/Output Summary (Last 24 hours) at 9/15/2020 1418  Last data filed at 9/15/2020 0550  Gross per 24 hour   Intake 3083.83 ml   Output 1050 ml   Net 2033.83 ml     I/O last 3 completed shifts: In: 3143.8 [I.V.:1286.8; NG/GT:1857]  Out: 5771 [BVZEK:4124]    Safety Concerns: At Risk for Falls    Impairments/Disabilities:      ***    Nutrition Therapy:  Current Nutrition Therapy:   - Oral Diet:  Dysphagia 1 pureed    Routes of Feeding: Oral  Liquids: Thin Liquids  Daily Fluid Restriction: no  Last Modified Barium Swallow with Video (Video Swallowing Test): done on 09/14/2020/***    Treatments at the Time of Hospital Discharge:   Respiratory Treatments: ***  Oxygen Therapy:  is not on home oxygen therapy.   Ventilator:    - No ventilator support    Rehab Therapies: ***  Weight Bearing Status/Restrictions: No weight bearing restirctions  Other Medical Equipment (for information only, NOT a DME order):  wheelchair and hospital bed  Other Treatments: Bilateral heels: clean with normal saline, apply heel mepilex border dressings, lift daily to assess skin, change every 3 days      Patient's personal belongings (please select all that are sent with patient):  None    RN SIGNATURE:  Electronically signed by Varghese Lancaster RN on 9/16/20 at 11:26 AM EDT    CASE MANAGEMENT/SOCIAL WORK SECTION    Inpatient Status Date: ***    Readmission Risk Assessment Score:  Readmission Risk              Risk of Unplanned Readmission:        34           Discharging to Facility/ Agency   · Name: [facility]  · Address:  · Phone:  · Fax:    Dialysis Facility (if applicable)   · Name:  · Address:  · Dialysis Schedule:  · Phone:  · Fax:    / signature: Electronically signed by LORENZA Gilmore on 9/15/2020 at 2:18 PM      PHYSICIAN SECTION    Prognosis: Guarded    Condition at Discharge: Stable    Rehab Potential (if transferring to Rehab): Guarded    Recommended Labs or Other Treatments After Discharge: none    Physician Certification: I certify the above information and transfer of Mp Ventura  is necessary for the continuing treatment of the diagnosis listed and that she requires Intermediate Nursing Care for greater than 30 days.      Update Admission H&P: No change in H&P    PHYSICIAN SIGNATURE:  Electronically signed by Vannessa Lees MD on 9/16/20 at 10:35 AM EDT

## 2020-09-15 NOTE — PROGRESS NOTES
Nephrology Progress Note  [facility]      CC:   arf    HPI:   The pt is an 79 yo female with a pmh of r breast cancer, htn, lbp, spinal stenosis, dm, dementia, cad who was admitted for ms changes and hypotension. She was started on levophed. She has ckd 3 with a baseline cr of 1.3. cr on admission was 6.6 and has improved to 3.3 so far. Other labs today show na 148 k 43.1 co2 18, vun 94, alb 2.4, wbc 19.1, hgb 10, plt 134. She has been started on a  Bicarb drip and free water. She is being treated for septic shock due to uti on cefepime. She is on iv steroids. april shows no hydro or stone. She did have a kyphoplasty in august. uo has been adequate. bp has been low. Her outpt lisinopril is on hold. Subjective    9/12: No new acute issue from overnight  9/13: c/o feeling cold  9/14: pt seen in room, no cp or sob, no acute distress  9/15 :pt seen in room, remains weak, no cp or sob. Meds:     hydrocortisone sodium succinate PF  50 mg Intravenous Daily    cefTRIAXone (ROCEPHIN) IV  1 g Intravenous Q24H    insulin glargine  20 Units Subcutaneous BID    insulin lispro  0-12 Units Subcutaneous Q6H    potassium & sodium phosphates  1 packet Per NG tube TID    mupirocin   Nasal BID    sodium chloride flush  10 mL Intravenous 2 times per day    heparin (porcine)  5,000 Units Subcutaneous 3 times per day    levothyroxine  88 mcg Oral Daily    influenza virus vaccine  0.5 mL Intramuscular Prior to discharge    memantine  10 mg Oral BID    miconazole nitrate   Topical BID    midodrine  5 mg Oral TID WC        0.45 % NaCl with KCl 20 mEq 50 mL/hr at 09/15/20 0643    dextrose         Meds prn:     sodium chloride flush, acetaminophen **OR** acetaminophen, polyethylene glycol, traMADol, glucose, dextrose, glucagon (rDNA), dextrose, miconazole nitrate **AND** miconazole nitrate, trimethobenzamide    Meds prior to admission:     No current facility-administered medications on file prior to encounter. mL 11    HUMALOG MIX 75/25 KWIKPEN (75-25) 100 UNIT/ML SUPN injection pen Inject 50 u SC q AM and 25 u SC q PM with dinner 30 pen 11       Allergies:    Darvon [propoxyphene hcl]; Pcn [penicillins]; and Propoxyphene        Physical Exam:      Patient Vitals for the past 24 hrs:   BP Temp Temp src Pulse Resp SpO2 Weight   09/15/20 0800 (!) 131/58 97.1 °F (36.2 °C) Temporal 65 18 93 % --   09/15/20 0513 -- -- -- -- -- -- 152 lb 4 oz (69.1 kg)   09/15/20 0000 (!) 108/52 97.1 °F (36.2 °C) -- 66 16 95 % --   09/14/20 1730 (!) 112/50 97.2 °F (36.2 °C) Temporal 61 17 96 % --         Intake/Output Summary (Last 24 hours) at 9/15/2020 1036  Last data filed at 9/15/2020 0550  Gross per 24 hour   Intake 3083.83 ml   Output 1050 ml   Net 2033.83 ml       General: Arouses easily; in no acute distress  Head: normocephalic, atraumatic   Neck: supple, no jvd  Cardiovascular: regular rate and rhythm, no murmurs, gallops, or rubs   Respiratory: decreased bases  Gastrointestinal: soft, nontender, nondistended, no hepatosplenomegaly  Ext: tr edema  Neuro: awake oriented to person  Skin: dry, no rash   Back: nontender    Data:    Recent Labs     09/13/20  0729 09/14/20 0643 09/15/20  0613   WBC 23.7* 24.1* 31.0*   HGB 10.4* 10.0* 10.8*   HCT 30.9* 30.7* 32.6*   MCV 97.8 100.3* 99.7   * 103* 101*       Recent Labs     09/13/20  0729 09/13/20  1800 09/14/20  0643 09/15/20  0613   * 144 143 144   K 3.1* 4.7 4.1 4.0   * 108* 105 108*   CO2 28 26 28 26   CREATININE 1.7* 1.6* 1.5* 1.3*   BUN 67* 66* 62* 50*   LABGLOM 28 30 33 39   GLUCOSE 45* 149* 113* 88   CALCIUM 7.7* 7.4* 7.2* 7.6*   PHOS 1.6*  --  1.7* 2.1*   MG 1.7  --  1.5* 1.5*       Vit D, 25-Hydroxy   Date Value Ref Range Status   08/11/2020 56 30 - 100 ng/mL Final     Comment:     <20 ng/mL. ........... [place] Deficient  20-30 ng/mL. ......... [place] Insufficient   ng/mL. ........ [place] Sufficient  >100 ng/mL. .......... [place] Toxic         PTH   Date Value Ref Range Status 12/01/2015 97 (H) 15 - 65 pg/mL Final       Recent Labs     09/13/20  0729 09/14/20  0643 09/15/20  0613   ALT 27 60* 66*   AST 29 68* 60*   ALKPHOS 118* 136* 126*   BILITOT 0.4 0.2 0.5       Recent Labs     09/13/20  0729 09/14/20  0643 09/15/20  0613   LABALBU 2.5* 2.3* 2.5*       Iron   Date Value Ref Range Status   05/08/2012 99 50 - 170 mcg/dL Final     TIBC   Date Value Ref Range Status   03/09/2011 313 250 - 450 mcg/dL Final       Vitamin B-12   Date Value Ref Range Status   09/10/2020 657 211 - 946 pg/mL Final       Folate   Date Value Ref Range Status   09/10/2020 9.1 4.8 - 24.2 ng/mL Final         Lab Results   Component Value Date    COLORU Yellow 09/09/2020    LABSPEC 1.0112 01/04/2020    NITRU Negative 09/09/2020    GLUCOSEU Negative 09/09/2020    GLUCOSEU NEGATIVE 12/17/2010    KETUA Negative 09/09/2020    UROBILINOGEN 0.2 09/09/2020    BILIRUBINUR Negative 09/09/2020    BILIRUBINUR Negative 01/04/2020       No results found for: ZAHRA, CREURRAN, MACREATRATIO, OSMOU    No components found for: URIC    No results found for: LIPIDPAN      Assessment and Plan:    1. MARLENE stage 3  Baseline 1.3  Cr improved from 6.6 to 1.3 so far  april neg  fena >1 c/w atn  Will continue IVF 1/2 ns 20 k at 50    2. Metabolic acidosis  In setting of arf, sepis, hypotension  Now resolved off bicarb drip    3. Sepsis  Urinary source  abx per id  Continue ivf    4. Hypernatremia  Now resolved  Follow on free water    5.  Hypokalemia  Replace prn    Donnell Uribe MD

## 2020-09-15 NOTE — PROGRESS NOTES
Update that patient will be going to [facility]. I placed call to the facility to see what equipment may be needed for patient- received voicemail and left message.

## 2020-09-15 NOTE — PROGRESS NOTES
Voice message left asking Dr Sowmya Stern to discontinue Video swallow, one was already done yesterday.

## 2020-09-15 NOTE — PROGRESS NOTES
Liaison Informational Visit Note                Patient Name: Celestine Quevedo   :  1933  MRN:  [id number]    Admit date:  2020    Admitted from: [facility] Admitting Physician:  Katrina Salgado MD   PCP:  Shine Juarez MD      Primary Insurance: Payor: Vitor Zimmer /  /  /    Secondary Insurance:  unknown    Emergency Contact:      Contact/Relation:   /         Phone:       Contact/Relation:   /     Phone:     Advance Directive  Advance directives received No  Patient has completed an advance directive   Discussed with: Family member  DPOA-HC Name-Relation:    Phone:       Terminal Diagnosis Alzheimers dementia as confirmed by Dr. Megan Mcnair Problem List:   Patient Active Problem List   Diagnosis Code    Spinal stenosis, lumbar region, without neurogenic claudication M48.061    Displacement of lumbar intervertebral disc without myelopathy M51.26    L1 vertebral fracture ([address] S32.019A    T12 compression fracture ([address] S22.080A    Cerebral atherosclerosis I67.2    Type 2 diabetes mellitus with stage 3 chronic kidney disease, with long-term current use of insulin ([address] E11.22, N18.3, Z79.4    Mixed hyperlipidemia E78.2    Hypothyroidism E03.9    Vitamin D deficiency E55.9    History of hypertension Z86.79    Coronary arteriosclerosis I25.10    Late onset Alzheimer's disease with behavioral disturbance ([address] G30.1, F02.81    Recurrent UTI N39.0    CKD (chronic kidney disease) stage 4, GFR 15-29 ml/min ([address] N18.4    Falls frequently R29.6    Thoracic compression fracture, closed, initial encounter ([address] S22.000A    Fall at home, sequela W19. 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       Code Status Order: DNR-CCA agree with [facility]    Past Medical History: Diagnosis Date    [facility]     RIGHT BREAST MASTECTOMY    CAD (coronary artery disease)     Chronic back pain     Displacement of lumbar intervertebral disc without myelopathy     Forgetfulness     Hypertension     Influenza vaccine administered 03/2019    Memory change     Pneumococcal vaccine administered 03/2019    Spinal stenosis, lumbar region, without neurogenic claudication     Type II or unspecified type diabetes mellitus without mention of complication, not stated as uncontrolled      Past Surgical History:        Procedure Laterality Date    BREAST LUMPECTOMY  4/14/10    CORONARY ARTERY BYPASS GRAFT  2009    QUADRUPLE BYPASS    EYE SURGERY      HYSTERECTOMY  1987    KYPHOSIS SURGERY N/A 8/3/2020    KYPHOPLASTY T12, EPIDURAL INJECTION performed by Cindy Arciniega MD at [facility]  5/6/10       Allergies:  Darvon [propoxyphene hcl]; Pcn [penicillins]; and Propoxyphene    Family Goal: comfort    Meeting held with daughter Victorino Greenberg. The hospice benefit and philosophy were explained including that hospice is end of life care in which, per Medicare, a patient has a terminal diagnosis that life expectancy would be 6 months or less. Hospice care is a service that is covered by most insurance plans. The following levels of hospice care were discussed including, routine level of hospice care at private home or facility, which patient/family is responsible for any room and board fees at the facility, and general in patient level of care (GIP) at the [facility] for short term symptom management. Per Medicare guidelines, a patient is considered appropriate for GIP if they have uncontrolled symptoms such as pain, agitation, labored breathing or nausea/vomiting.   Once symptoms become managed, the patient would need to be moved to a lower level of care such as home with hospice, ECF with hospice or the Transition program.  [facility] transition program also explained which is routine hospice care provided at the [facility] instead of an ECF or home. The transition program is private pay $300/day for room/board. Room/board for the transition program is not covered by Medicaid as would be in an ECF. Family informed that with the routine level of care at home or ECF,  the hospice team consists of the RN who visits 1-3 times a week, a  who visits within the first five days of the hospice election, the personal care team who visit 1-3 times a week, non-medical volunteers and Chaplains. Explained that at home in routine level of care, familles are responsible for the 24 hour care. Discussed that under hospice care patient would not receive chemotherapy, radiation, immune therapy, IV antibiotics, dialysis or blood transfusions. Explained that once in hospice care, all aggressive treatments would be stopped and allow nature to takes its course with focus on comfort care for the patient. Usama Pacheco states that [facility] is out of town but they have discussed everything and would like to proceed with hospice care. Usama Pacheco [facility] they are waiting for the swallow test to be done. I spoke with charge nurse and test has not been done yet. Spoke with  and she will let me know for sure which facility patient is going to. Discharge Plan:  Discharge Disposition; 3 Celia Morrison Name: DOUG MARTINEZ plan:  1. I will contact ECF for equipment needs as soon as ECF is confirmed  2. Please call [facility] [phone] with any questions. 3. Patient not currently under the care of hospice.     Electronically signed by Zachary Stewart RN on 9/15/2020 at 1:53 PM

## 2020-09-15 NOTE — PROGRESS NOTES
Subjective:  Patient is actually very alert today and answering all questions  She tells me that she probably will be able to swallow food if she tries  She pulled out her NG tube through the night    Objective:    BP (!) 131/58   Pulse 65   Temp 97.1 °F (36.2 °C) (Temporal)   Resp 18   Ht 5' 5\" (1.651 m)   Wt 152 lb 4 oz (69.1 kg)   SpO2 93%   BMI 25.34 kg/m²     In: 710.8 [I.V.:710.8]  Out: 650     HEENT: NG tube in place for nutrition NCAT,  PERRLA, No JVD  Heart:  RRR, no murmurs, gallops, or rubs.   Lungs:  CTA bilaterally, no wheeze, rales or rhonchi  Abd: bowel sounds present, nontender, nondistended, no masses  Extrem:  No clubbing, cyanosis, or edema     Recent Labs     09/13/20  0729 09/14/20  0643 09/15/20  0613   WBC 23.7* 24.1* 31.0*   HGB 10.4* 10.0* 10.8*   HCT 30.9* 30.7* 32.6*   * 103* 101*       Recent Labs     09/13/20  1800 09/14/20  0643 09/15/20  0613    143 144   K 4.7 4.1 4.0   * 105 108*   CO2 26 28 26   BUN 66* 62* 50*   CREATININE 1.6* 1.5* 1.3*   CALCIUM 7.4* 7.2* 7.6*       Assessment:    Patient Active Problem List   Diagnosis    Spinal stenosis, lumbar region, without neurogenic claudication    Displacement of lumbar intervertebral disc without myelopathy    L1 vertebral fracture (HCC)    T12 compression fracture (HCC)    Cerebral atherosclerosis    Type 2 diabetes mellitus with stage 3 chronic kidney disease, with long-term current use of insulin (HCC)    Mixed hyperlipidemia    Hypothyroidism    Vitamin D deficiency    History of hypertension    Coronary arteriosclerosis    Late onset [facility]'s disease with behavioral disturbance ([facility])    Recurrent UTI    CKD (chronic kidney disease) stage 4, GFR 15-29 ml/min ([facility])    Falls frequently    [facility] compression fracture, closed[facility] initial encounter ([facility]    Fall at home, sequela    Compression fracture of body of thoracic vertebra (HCC)    Septic shock ([facility]    UTI (urinary tract infection)

## 2020-09-15 NOTE — PROGRESS NOTES
Call again to [facility] to verify equipment needs- will need low air loss mattress which will be delivered tomorrow 10 am to 2 pm.  Physician at [facility] will be Dr Trino Romero. I will follow up in the morning to complete discharge to [facility].

## 2020-09-15 NOTE — CARE COORDINATION
Spoke with the pt's daughter, Francisca Quinones. Regarding hospice choices. She chose [facility]. Referral made. [facility] accepted the pt. [facility] in contact with the facility to see if they need to deliver any equipment prior to the pt being admitted there.  Nida Lara RN [phone]

## 2020-09-16 NOTE — CARE COORDINATION
Discharge plan is [facility] hosp[facility] ([facility] services. The equipment needed will be delivered this morning, so will plan on afternoon discharge. Will follow.  Larissa Gatica RN -[phone]

## 2020-09-16 NOTE — PROGRESS NOTES
Nephrology Progress Note  [facility]      CC:   arf    HPI:   The pt is an 81 yo female with a pmh of r breast cancer, htn, lbp, spinal stenosis, dm, dementia, cad who was admitted for ms changes and hypotension. She was started on levophed. She has ckd 3 with a baseline cr of 1.3. cr on admission was 6.6 and has improved to 3.3 so far. Other labs today show na 148 k 43.1 co2 18, vun 94, alb 2.4, wbc 19.1, hgb 10, plt 134. She has been started on a  Bicarb drip and free water. She is being treated for septic shock due to uti on cefepime. She is on iv steroids. april shows no hydro or stone. She did have a kyphoplasty in august. uo has been adequate. bp has been low. Her outpt lisinopril is on hold. Subjective    9/12: No new acute issue from overnight  9/13: c/o feeling cold  9/14: pt seen in room, no cp or sob, no acute distress  9/15 :pt seen in room, remains weak, no cp or sob.   9/16: pt seen in room, confused, no sob      Meds:     magnesium sulfate  2 g Intravenous Once    hydrocortisone sodium succinate PF  50 mg Intravenous Daily    cefTRIAXone (ROCEPHIN) IV  1 g Intravenous Q24H    insulin glargine  20 Units Subcutaneous BID    insulin lispro  0-12 Units Subcutaneous Q6H    potassium & sodium phosphates  1 packet Per NG tube TID    mupirocin   Nasal BID    sodium chloride flush  10 mL Intravenous 2 times per day    heparin (porcine)  5,000 Units Subcutaneous 3 times per day    levothyroxine  88 mcg Oral Daily    influenza virus vaccine  0.5 mL Intramuscular Prior to discharge    memantine  10 mg Oral BID    miconazole nitrate   Topical BID    midodrine  5 mg Oral TID WC        dextrose         Meds prn:     sodium chloride flush, acetaminophen **OR** acetaminophen, polyethylene glycol, traMADol, glucose, dextrose, glucagon (rDNA), dextrose, miconazole nitrate **AND** miconazole nitrate, trimethobenzamide    Meds prior to admission:     No current facility-administered medications on file prior to encounter. Current Outpatient Medications on File Prior to Encounter   Medication Sig Dispense Refill    insulin lispro, 1 Unit Dial, (HUMALOG KWIKPEN) 100 UNIT/ML SOPN If B-199 = 2u  200-249 = 4u  250-299 = 6u  300-349 = 8u  350-399 = 10u  400+ = 12u  Before meals and at bedtime      blood glucose test strips (ACCU-CHEK LEONARDO) strip 1 each by In Vitro route daily Test blood sugar daily      Wound Dressings (ALLEVYN ADHESIVE) PADS Apply 1 each topically every 3 days And prn 20 each 2    memantine (NAMENDA) 10 MG tablet TAKE 1 TABLET BY MOUTH TWICE DAILY 62 tablet 11    torsemide (DEMADEX) 5 MG tablet Take 1 tablet by mouth daily (Give 1 tablet twice daily until swelling resolved) 30 tablet 3    atorvastatin (LIPITOR) 20 MG tablet TAKE 1 TABLET BY MOUTH DAILY AT BEDTIME 90 tablet 10    levothyroxine (SYNTHROID) 88 MCG tablet TAKE ONE(1) TABLET BY MOUTH ONCE DAILY **CYC 90 tablet 10    GLUTOSE 15 40 % GEL USE 1 TUBE AS NEEDED 37.5 g 10    GLUCAGEN HYPOKIT 1 MG SOLR injection USE AS DIRECTED 1MG AS NEEDED 1 each 10    lisinopril (PRINIVIL;ZESTRIL) 2.5 MG tablet TAKE ONE(1) TABLET BY MOUTH ONCE DAILY 31 tablet 11    metoprolol succinate (TOPROL XL) 50 MG extended release tablet Take 1 tablet by mouth daily Do not crush or chew. 1 tablet 10    Blood Glucose Calibration (OT ULTRA/FASTTK CNTRL SOLN) SOLN   5    NOVOFINE AUTOCOVER 30G X 8 MM MISC USE AS DIRECTED TWICE DAILY.  60 each 11    Cholecalciferol (VITAMIN D3) 2000 units CAPS Take 1 capsule by mouth daily  11    acetaminophen (TYLENOL) 325 MG tablet Take 650 mg by mouth every 6 hours as needed for Pain or Fever      Melatonin Gummies 2.5 MG CHEW Take 2 each by mouth nightly      Ascorbic Acid (VITAMIN C) 500 MG tablet Take 500 mg by mouth 2 times daily      LANTUS SOLOSTAR 100 UNIT/ML injection pen INJECT 45 UNITS SUB-Q DAILY **DISCARD 28 DAYS AFTER OPENING* 12 mL 11    HUMALOG MIX 75/25 KWIKPEN (75-25) 100 UNIT/ML SUPN injection pen Inject 50 u SC q AM and 25 u SC q PM with dinner 30 pen 11       Allergies:    Darvon [propoxyphene hcl]; Pcn [penicillins]; and Propoxyphene        Physical Exam:      Patient Vitals for the past 24 hrs:   BP Temp Temp src Pulse Resp SpO2 Weight   09/16/20 0730 (!) 128/56 97 °F (36.1 °C) Temporal 54 18 95 % --   09/16/20 0510 -- -- -- -- -- -- 151 lb 7 oz (68.7 kg)   09/15/20 1600 (!) 132/59 98.1 °F (36.7 °C) Temporal 89 18 94 % --         Intake/Output Summary (Last 24 hours) at 9/16/2020 1333  Last data filed at 9/16/2020 1145  Gross per 24 hour   Intake 60 ml   Output 1300 ml   Net -1240 ml       General: Arouses easily; in no acute distress  Head: normocephalic, atraumatic   Neck: supple, no jvd  Cardiovascular: regular rate and rhythm, no murmurs, gallops, or rubs   Respiratory: decreased bases  Gastrointestinal: soft, nontender, nondistended, no hepatosplenomegaly  Ext: tr edema  Neuro: awake oriented to person  Skin: dry, no rash   Back: nontender    Data:    Recent Labs     09/14/20  0643 09/15/20  0613 09/16/20  0559   WBC 24.1* 31.0* 23.0*   HGB 10.0* 10.8* 9.4*   HCT 30.7* 32.6* 29.4*   .3* 99.7 100.0*   * 101* 114*       Recent Labs     09/14/20  0643 09/15/20  0613 09/16/20  0559    144 143   K 4.1 4.0 3.6    108* 107   CO2 28 26 24   CREATININE 1.5* 1.3* 1.2*   BUN 62* 50* 40*   LABGLOM 33 39 42   GLUCOSE 113* 88 142*   CALCIUM 7.2* 7.6* 7.8*   PHOS 1.7* 2.1* 2.7   MG 1.5* 1.5* 1.5*       Vit D, 25-Hydroxy   Date Value Ref Range Status   08/11/2020 56 30 - 100 ng/mL Final     Comment:     <20 ng/mL. ........... Kenny John Deficient  20-30 ng/mL. ......... Kenny John Insufficient   ng/mL. ........ Kenny John Sufficient  >100 ng/mL. .......... Kenny John Toxic         PTH   Date Value Ref Range Status   12/01/2015 97 (H) 15 - 65 pg/mL Final       Recent Labs     09/14/20  0643 09/15/20  0613 09/16/20  0559   ALT 60* 66* 46*   AST 68* 60* 34*   ALKPHOS 136* 126* 112*   BILITOT 0.2 0.5 0.4       Recent Labs 09/14/20  0643 09/15/20  0613 09/16/20  0559   LABALBU 2.3* 2.5* 2.2*       Iron   Date Value Ref Range Status   05/08/2012 99 50 - 170 mcg/dL Final     TIBC   Date Value Ref Range Status   03/09/2011 313 250 - 450 mcg/dL Final       Vitamin B-12   Date Value Ref Range Status   09/10/2020 657 211 - 946 pg/mL Final       Folate   Date Value Ref Range Status   09/10/2020 9.1 4.8 - 24.2 ng/mL Final         Lab Results   Component Value Date    COLORU Yellow 09/09/2020    LABSPEC 1.0112 01/04/2020    NITRU Negative 09/09/2020    GLUCOSEU Negative 09/09/2020    GLUCOSEU NEGATIVE 12/17/2010    KETUA Negative 09/09/2020    UROBILINOGEN 0.2 09/09/2020    BILIRUBINUR Negative 09/09/2020    BILIRUBINUR Negative 01/04/2020       No results found for: ZAHRA, CREURRAN, MACREATRATIO, OSMOU    No components found for: URIC    No results found for: LIPIDPAN      Assessment and Plan:    1. MARLENE stage 3  Baseline 1.3  Cr improved from 6.6 to 1.2 so far  april neg  fena >1 c/w atn  Will continue IVF 1/2 ns 20 k at 50    2. Metabolic acidosis  In setting of arf, sepis, hypotension  Now resolved off bicarb drip    3. Sepsis  Urinary source  abx per id  Continue ivf    4. Hypernatremia  Now resolved  Follow on free water    5.  Hypokalemia  Replace prn    Ok for Preethi Jeronimo MD

## 2020-09-16 NOTE — PROGRESS NOTES
Nurse to nurse given to Elvi Walker at [address] in [facility].

## 2020-09-16 NOTE — DISCHARGE SUMMARY
Physician Discharge Summary     Patient ID:  Celestine Quevedo  [id number]  11 y.o.  4/25/1933    Admit date: 9/9/2020    Discharge date and time: 9/16/2020    Admission Diagnoses:   Patient Active Problem List   Diagnosis    Spinal stenosis, lumbar region, without neurogenic claudication    Displacement of lumbar intervertebral disc without myelopathy    [facility]    T12 compression fracture ([facility]    Cerebral atherosclerosis    Type 2 diabetes mellitus with stage 3 chronic kidney disease, with long-term current use of insulin ([facility])    Mixed hyperlipidemia    Hypothyroidism    Vitamin D deficiency    History of hypertension    Coronary arteriosclerosis    Late onset Alzheimer's disease with behavioral disturbance ([facility])    Recurrent UTI    CKD (chronic kidney disease) stage 4, GFR 15-29 ml/min ([facility])    Falls frequently    Thoracic compression fracture, closed, initial encounter ([facility] [address]    Fall at home, sequela    Compression fracture of body of thoracic vertebra ([facility]    Septic shock ([facility]    UTI (urinary tract infection)    Hypernatremia    MARLENE (acute kidney injury) ([facility]    Hypophosphatasia       Discharge Diagnoses: Septic shock secondary to UTI    Consults: Pulmonary critical care, renal    Procedures: None    Hospital Course: TAdmit to ICU for Sepsis shock from urine source   Check us renal-negative for hydronephrosis  IV Rocephin for now for UTI/sepsis  Wean stress dose steroids-wean off steroids  wbc- 23,000 at the time of discharge- on daily Solu-Cortef which will be stopped at discharge  Swallow evaluation completed-patient placed on dysphagia puréed diet which she is tolerating, comfort diet on discharge secondary to hospice    NSTEMI by enzymes from hypotension  Likely from decreased perfusion   monitor for chest pain   Keep hgb >8   Keep bp adequate for perfusion     Altered mentation   Back to baseline       Acute on Chronic renal failure / hypernatremia / hypokalemia/hypomagnesemia  Supplemented and at baseline    DMT2  iss   Resume home medications    Nutrition-tube feed, can try p.o. DVT Prophylaxis   PT/OT  Discharge planning-family wishes for patient to return to facility with hospice, they do not wish for a PEG placement as per patient's advanced directives  Discussed with social service today regarding above  If she fails swallow eval, patient can be initiated on comfort diet as per wishes of family to continue with hospice care and no PEG      Recent Labs     2043 09/15/20  0613 20  0559   WBC 24.1* 31.0* 23.0*   HGB 10.0* 10.8* 9.4*   HCT 30.7* 32.6* 29.4*   * 101* 114*       Recent Labs     09/14/20  0643 09/15/20  0613 09/16/20  0559    144 143   K 4.1 4.0 3.6    108* 107   CO2 28 26 24   BUN 62* 50* 40*   CREATININE 1.5* 1.3* 1.2*   CALCIUM 7.2* 7.6* 7.8*       Ct Abdomen Pelvis Wo Contrast Additional Contrast? None    Result Date: 9/10/2020  Patient MRN:  [id number] : 1933 Age: 80 years Gender: Female Order Date:  2020 10:48 PM EXAM: CT ABDOMEN PELVIS WO CONTRAST COMPARISON: None INDICATION:  ab pain ab pain TECHNIQUE:  Low-dose CT acquisition technique included one of the following options; 1. Automated exposure control, 2. Adjustment of mA and/or kV according to the patient's size or 3. Use of iterative reconstruction. FINDINGS: There are inflammatory changes surrounding the pancreatic head and duodenum. There is heterogeneous appearance of the pancreatic head. There is no intrahepatic bile duct dilatation. Common bile duct is not visualized. No hydronephrosis. No significant perinephric fat stranding. There is no bowel obstruction or free air. Moderate amount of stool is present throughout the colon. The appendix is visualized and normal. Severe atherosclerotic calcifications are associated with the abdominal aorta. View of the lung bases shows no evidence of pneumonia. There is a small hiatal hernia.  There is a chronic compression fracture involving T12 with evidence of kyphoplasty and retropulsion resulting in significant central canal stenosis. 1. Thickened heterogeneous appearance of the pancreatic head with surrounding inflammatory changes. Findings could suggest acute pancreatitis versus duodenitis. 2. No bowel obstruction, free air, or free fluid. 3. Moderate amount stool is seen throughout the colon. 4. Severe atherosclerotic disease is associated with the abdominal aorta. Ct Head Without Contrast    Result Date: 2020  Patient MRN: [id number] : 1933 Age:  80 years Gender: Female Order Date: 2020 4:05 PM Exam: CT HEAD WO CONTRAST Number of Images: 148 views Indication:  Acute confusion. Ams Comparison: 2020 TECHNIQUE: Region of study: Head. CT images acquired without intravenous contrast. One or more of these dose optimization techniques were utilized: Automated exposure control; mA and/or kV adjustment per patient size (includes targeted exams where dose is matched to clinical indication); or iterative reconstruction. FINDINGS: No mass, hemorrhage or midline shift. There is atrophy and likely chronic microvascular ischemic disease as before. Bony calvarium unremarkable. Atrophy and likely chronic microvascular ischemic disease. Xr Chest Portable    Result Date: 2020  Patient MRN: [id number] : 1933 Age:  80 years Gender: Female Order Date: 2020 2:52 PM Exam: XR CHEST PORTABLE Indication:   ams ams Comparison: 116 FINDINGS: The lungs are clear. The heart is normal in size. Status post CABG. The aorta is mildly tortuous. No pulmonary vascular congestion. No pneumothorax or pleural effusion. Bones are demineralized. Vertebroplasty cement seen within a lower thoracic vertebral body. No acute cardiopulmonary abnormality.     Xr Abdomen For Ng/og/ne Tube Placement    Result Date: 9/10/2020  Patient MRN:  [id number] : 1933 Age: 80 years Gender: Female Order Date:  9/10/2020 5:10 PM EXAM: XR ABDOMEN FOR NG/OG/NE TUBE PLACEMENT INDICATION:  Confirmation of course of NG/OG/NE tube and location of tip of tube Confirmation of course of NG/OG/NE tube and location of tip of tube Portable? ->Yes  COMPARISON: None FINDINGS: The nasogastric tube is well positioned, with the tip overlying the proximal gastric body. The bowel gas pattern is unremarkable. The visualized lungs are clear. Tip of the nasogastric tube overlies the stomach. Us Retroperitoneal Complete    Result Date: 9/10/2020  Patient MRN:  [id number] : 1933 Age: 80 years Gender: Female Order Date:  9/10/2020 6:09 PM EXAM: US RETROPERITONEAL COMPLETE COMPARISON: None INDICATION:  Samara, r/o hydronephrosis Samara, r/o hydronephrosis FINDINGS: Right kidney measures 8 x 3 x 3.7 cm. Left kidney measures 9.9 x 4.4 x 4.2 cm. There is prominence of the right renal pelvis. No obvious hydronephrosis. No perinephric fluid collections. No shadowing renal calculus. Left kidney is suboptimally visualized on this examination. Silva catheter is present. 1. Limited visualization of the left kidney. 2. Mild prominence of right renal pelvis. 3. No evidence of hydronephrosis or shadowing renal calculus. Discharge Exam:    HEENT: NCAT,  PERRLA, No JVD  Heart:  RRR, no murmurs, gallops, or rubs.   Lungs:  CTA bilaterally, no wheeze, rales or rhonchi  Abd: bowel sounds present, nontender, nondistended, no masses  Extrem:  No clubbing, cyanosis, or edema    Disposition: SNF under hospice    Patient Condition at Discharge: Guarded but stable    Patient Instructions:      Medication List      ASK your doctor about these medications    Accu-Chek Judy strip  Generic drug:  blood glucose test strips     acetaminophen 325 MG tablet  Commonly known as:  TYLENOL     Allevyn Adhesive Pads  Apply 1 each topically every 3 days And prn     atorvastatin 20 MG tablet  Commonly known as:  LIPITOR  TAKE 1 TABLET BY MOUTH DAILY AT BEDTIME GlucaGen HypoKit 1 MG Solr injection  Generic drug:  glucagon (rDNA)  USE AS DIRECTED 1MG AS NEEDED     Glutose 15 40 % Gel  Generic drug:  glucose  USE 1 TUBE AS NEEDED     HumaLOG KwikPen 100 UNIT/ML Sopn  Generic drug:  insulin lispro (1 Unit Dial)     HumaLOG Mix 75/25 KwikPen (75-25) 100 UNIT per ML Supn injection pen  Generic drug:  insulin lispro protamine & lispro  Inject 50 u SC q AM and 25 u SC q PM with dinner     Lantus SoloStar 100 UNIT/ML injection pen  Generic drug:  insulin glargine  INJECT 45 UNITS SUB-Q DAILY **DISCARD 28 DAYS AFTER OPENING*     levothyroxine 88 MCG tablet  Commonly known as:  SYNTHROID  TAKE ONE(1) TABLET BY MOUTH ONCE DAILY **CYC     lisinopril 2.5 MG tablet  Commonly known as:  PRINIVIL;ZESTRIL  TAKE ONE(1) TABLET BY MOUTH ONCE DAILY     melatonin gummy 2.5 MG Chew chewable     memantine 10 MG tablet  Commonly known as:  NAMENDA  TAKE 1 TABLET BY MOUTH TWICE DAILY     metoprolol succinate 50 MG extended release tablet  Commonly known as:  TOPROL XL  Take 1 tablet by mouth daily Do not crush or chew. NovoFine Autocover 30G X 8 MM Misc  Generic drug:  Insulin Pen Needle  USE AS DIRECTED TWICE DAILY. OT ULTRA/FASTTK CNTRL SOLN Soln     torsemide 5 MG tablet  Commonly known as:  DEMADEX  Take 1 tablet by mouth daily (Give 1 tablet twice daily until swelling resolved)     traMADol 50 MG tablet  Commonly known as:  ULTRAM     vitamin C 500 MG tablet  Commonly known as:  ASCORBIC ACID     Vitamin D3 50 MCG (2000 UT) Caps          Activity: bedrest  Diet: regular diet    Pt has been advised to: Follow-up with Hattie Keenan MD in 1 week.   Follow-up with consultants as recommended by them    Note that over 30 minutes was spent in preparing discharge papers, discussing discharge with patient, medication review, etc.    Signed:  Nelly Fairchild MD  9/16/2020  10:32 AM

## 2020-09-16 NOTE — PROGRESS NOTES
I spoke with Cheng Rand over the phone and she has given permission for her sister Juan Carlos Rausch to sign hospice consents since [place] is out of town until Friday. I will follow up with Juan Carlos Rausch regarding the consents.

## 2020-09-16 NOTE — CARE COORDINATION
Transportation has been arranged or 1:00 with [facility]. Daughter, Jamir Tam, facility and nursing aware of the time.  Syed Wilson RN [phone]

## 2020-10-09 PROBLEM — N39.0 UTI (URINARY TRACT INFECTION): Status: RESOLVED | Noted: 2020-01-01 | Resolved: 2020-01-01

## 2023-01-23 NOTE — PROGRESS NOTES
1/13/2020    Home visit medically necessary in lieu of an office visit due to: [facility] resident, dementia, difficult to get out. Spoke with dtr Henry Stacy on phone. HPI:  Patient says she is doing fine. Staff reports no problems. She has not had any recent issues with anxiety. Sleeping well at night. Patient says she feels well and has a good appetite. She's moves her bowels daily without constipation or diarrhea. She has not been up at night wandering on melatonin. She has not had any falls this month. REVIEW OF SYSTEMS:    GENERAL: Appetite good. No weight change, generally healthy, no change in strength or exercise tolerance. CARDIOVASCULAR: No edema, no chest pains, no murmurs, no palpitations, no syncope, no orthopnea. RESPIRATORY: No shortness of breath, no pain with breathing, no wheezing, no hemoptysis, no cough. GASTROINTESTINAL: No change in appetite, no dysphagia, no heartburn, no abdominal pains, no emesis, no melena, no hemorrhoids. DIARRHEA, CONSTIPATION. GENITOURINARY: No incontinence or retention, no urinary urgency, no nocturia, no dysuria, no change in nature of urine. RECURRENT UTIs. MUSCULOSKELETAL: No pain in muscles or joints, no swelling or redness of joints, no limitation of range of motion, no weakness or numbness. NEURO/PSYCH: No weakness, no tremor, no seizures, no changes in mentation, no ataxia. No depressive symptoms, no changes in sleep habits. DEMENTIA,      All other systems negative.     Allergies   Allergen Reactions    Darvon [Propoxyphene Hcl]     Pcn [Penicillins]      Past Medical History:   Diagnosis Date    Breast cancer ([facility] [address]     RIGHT BREAST MASTECTOMY    CAD (coronary artery disease)     Chronic back pain     Displacement of lumbar intervertebral disc without myelopathy     Forgetfulness     Hypertension     Influenza vaccine administered 03/2019    Memory change     Pneumococcal vaccine administered 03/2019    Spinal stenosis, Price (Do Not Change): 0.00 Detail Level: Simple Instructions: This plan will send the code FBSE to the PM system.  DO NOT or CHANGE the price. ecchymosis, or other lesions. NEURO:   No tremor, motor UEs 5/5 LEs 5/5, sensory normal, mild ataxia. PSYCH:  Pleasant and cooperative. Fluid speech, oriented to person, partially to place, but not time. No delusional statements. Medications reviewed. Labs  10/25/19 GFR 36, lytes   8/26/19 FT4 1.20  7/21/19 UA CS/ mixed mally<10,000  7/9/19 HH 12/35, GFR 33, BUN/cre 38/1.5, lytes nl, LFTs nl, A1c 8.1, , HDL 49, FT4 1.1, TSH 0.22, Vit D 24    ASSESSMENT:  Elderly female has Spinal stenosis, lumbar region, without neurogenic claudication; Displacement of lumbar intervertebral disc without myelopathy; L1 vertebral fracture ([address] T12 compression fracture ([address] Back pain; Cerebral atherosclerosis; Type 2 diabetes mellitus with stage 3 chronic kidney disease, with long-term current use of insulin ([address] Mixed hyperlipidemia; Hypothyroidism; Vitamin D deficiency; Benign hypertension; Coronary arteriosclerosis; Late onset Alzheimer's disease with behavioral disturbance ([address] and Recurrent UTI on their problem list.     Diagnoses attached to this encounter:  Darius was seen today for dementia. Diagnoses and all orders for this visit:    Late onset Alzheimer's disease with behavioral disturbance ([address]    Benign hypertension  -     Basic Metabolic Panel; Future    Coronary arteriosclerosis    Recurrent UTI       PLAN:  Check labs. Type 2 DM managed by Dr David Epstein at Dr Chuyita Quispe office. Check BMP and A1c in April. Continue other meds as per Rx List. Recheck 1 month. 40 minutes spent on visit, 25 minutes involved education/counseling regarding dementia, cardiac, DM, HTN and thyroid disease processes, treatment options, meds and coordination of care.    Current Outpatient Medications   Medication Sig Dispense Refill    ciprofloxacin (CIPRO) 250 MG tablet Take 1 tablet by mouth 2 times daily for 7 days 14 tablet 0    HUMALOG MIX 75/25 KWIKPEN (75-25) 100 UNIT/ML SUPN injection pen Inject 63 u SC q AM path.   Use your walker or cane to provide stability.  Dont get tangled up in your oxygen tubing. Diabetes Care Plan Goal: Prevent organ damage. A1c < 7.0, < 8.0 for elderly in their 80s or 90s   Take diabetes medication as instructed by physician. Make adjustment only if advised.  Monitor BS regularly:  if on insulin, 3-4 times daily. If on oral medication, as needed.  Target Blood Sugars:   fasting, <200 after meals.  Low carb diet weight loss discussion:   Fewer calories in, more calories out.  Exercise discussion:  Use it or lose it. Start slow and build up.  Follow up for bloodwork (HbA1C):  every 3-6 months.  Need annual visit with eye doctor, dentist and podiatrist.  Coronary Heart Disease Care Plan Goal:  Prevent heart attacks and chest pain (angina)   Learn signs and symptoms of heart attacks. Use nitroglycerin tabs for chest pain.  Take medications as directed by doctor.  Maintain good control of diabetes, high blood pressure and high cholesterol.  Exercise daily and stay on healthy well balance diet. Lose weight if necessary. Return in about 1 month (around 2/13/2020). An  electronic signature was used to authenticate this note.     --Suzette Littlejohn MD on 1/13/2020 at 1:00 PM

## 2023-04-07 NOTE — TELEPHONE ENCOUNTER
5 min - Send order - Increase torsemide to 20mg daily for 5 days then return to 10mg daily. .  Encourage elevation above heart for 30 minutes after each meal.
Fax from [facility] 1947. Robert Almendarez had 2+ pitting edema to L foot/ankle. +pedal pulses, No c/o pain or discomfort. Pelase advise. Lung sounds clear. /60, HR 64. She was encouraged to elevate BLE.
Order faxed to [facility]
No indicators present
show

## (undated) DEVICE — CLOTH SURG PREP PREOPERATIVE CHLORHEXIDINE GLUC 2% READYPREP

## (undated) DEVICE — BONE FILLER DEVICE F04B SIZE 3

## (undated) DEVICE — DRAPE,REIN 53X77,STERILE: Brand: MEDLINE

## (undated) DEVICE — INTENDED FOR TISSUE SEPARATION, AND OTHER PROCEDURES THAT REQUIRE A SHARP SURGICAL BLADE TO PUNCTURE OR CUT.: Brand: BARD-PARKER ® STAINLESS STEEL BLADES

## (undated) DEVICE — BONE BIOPSY DEVICE F05A BBD SIZE 3: Brand: MEDTRONIC REUSABLE INSTRUMENTS

## (undated) DEVICE — TOWEL,OR,DSP,ST,BLUE,DLX,10/PK,8PK/CS: Brand: MEDLINE

## (undated) DEVICE — Device

## (undated) DEVICE — SWABSTICK SURG PREP BENZOIN TINCTURE SINGLE ST

## (undated) DEVICE — GOWN,SIRUS,FABRNF,L,20/CS: Brand: MEDLINE

## (undated) DEVICE — MEDIA CONTRAST RX ISOVUE-300 61% 30ML VIALS

## (undated) DEVICE — GARMENT,MEDLINE,DVT,INT,CALF,MED, GEN2: Brand: MEDLINE

## (undated) DEVICE — DRAPE 24X23IN RADIOLOGY CASS ADHES STRIP

## (undated) DEVICE — SYRINGE MED 10ML LUERLOCK TIP W/O SFTY DISP

## (undated) DEVICE — STRIP,CLOSURE,WOUND,MEDI-STRIP,1/4X3: Brand: MEDLINE

## (undated) DEVICE — NEEDLE HYPO 25GA L0.625IN BLU POLYPR HUB S STL REG BVL STR

## (undated) DEVICE — GAUZE,SPONGE,4"X4",16PLY,XRAY,STRL,LF: Brand: MEDLINE

## (undated) DEVICE — DOUBLE BASIN SET: Brand: MEDLINE INDUSTRIES, INC.

## (undated) DEVICE — DRAPE THER FLUID WARMING 66X44 IN FLAT SLUSH DBL DISC ORS

## (undated) DEVICE — TAMP K08A XPAN INFLAT BONE  SIZE 20/3-RB: Brand: KYPHON XPANDER™ INFLATABLE BONE TAMP

## (undated) DEVICE — COUNTER NDL 30 COUNT DBL MAG

## (undated) DEVICE — BANDAGE WND ADH LF FLX CURAD 3/4X3IN

## (undated) DEVICE — APPLICATOR PREP 26ML 0.7% IOD POVACRYLEX 74% ISO ALC ST

## (undated) DEVICE — PACK,LAPAROTOMY,NO GOWNS: Brand: MEDLINE

## (undated) DEVICE — GLOVE SURG SZ 65 THK91MIL LTX FREE SYN POLYISOPRENE

## (undated) DEVICE — INTRODUCER T15K ONE STEP OID BEVEL: Brand: KYPHON® ONE-STEP™ OSTEO INTRODUCER™ SYSTEM

## (undated) DEVICE — SYRINGE A08E KIS INFLATION HP: Brand: KYPHON®  INFLATION SYRINGE

## (undated) DEVICE — 1000 S-DRAPE TOWEL DRAPE 10/BX: Brand: STERI-DRAPE™

## (undated) DEVICE — TRAP,MUCUS SPECIMEN,40CC: Brand: MEDLINE

## (undated) DEVICE — GOWN,SIRUS,FABRNF,XL,20/CS: Brand: MEDLINE

## (undated) DEVICE — 3M™ STERI-DRAPE™ INCISE DRAPE 1050 (60CM X 45CM): Brand: STERI-DRAPE™

## (undated) DEVICE — LABEL MED 4 IN SURG PANEL W/ PEN STRL

## (undated) DEVICE — DRAPE C ARM UNIV W41XL74IN CLR PLAS XR VELC CLSR POLY STRP